# Patient Record
Sex: FEMALE | Race: WHITE | Employment: OTHER | URBAN - NONMETROPOLITAN AREA
[De-identification: names, ages, dates, MRNs, and addresses within clinical notes are randomized per-mention and may not be internally consistent; named-entity substitution may affect disease eponyms.]

---

## 2018-02-12 ENCOUNTER — OFFICE VISIT (OUTPATIENT)
Dept: SURGERY | Age: 73
End: 2018-02-12
Payer: MEDICARE

## 2018-02-12 VITALS
BODY MASS INDEX: 18.32 KG/M2 | RESPIRATION RATE: 18 BRPM | HEIGHT: 63 IN | WEIGHT: 103.4 LBS | SYSTOLIC BLOOD PRESSURE: 120 MMHG | HEART RATE: 92 BPM | DIASTOLIC BLOOD PRESSURE: 60 MMHG | OXYGEN SATURATION: 90 % | TEMPERATURE: 98 F

## 2018-02-12 DIAGNOSIS — R92.8 OTHER ABNORMAL AND INCONCLUSIVE FINDINGS ON DIAGNOSTIC IMAGING OF BREAST: ICD-10-CM

## 2018-02-12 DIAGNOSIS — R59.0 LYMPHADENOPATHY, AXILLARY: Primary | ICD-10-CM

## 2018-02-12 DIAGNOSIS — R92.2 INCONCLUSIVE MAMMOGRAM: ICD-10-CM

## 2018-02-12 DIAGNOSIS — R06.02 SHORTNESS OF BREATH: ICD-10-CM

## 2018-02-12 DIAGNOSIS — N63.11 MASS OF UPPER OUTER QUADRANT OF RIGHT BREAST: ICD-10-CM

## 2018-02-12 DIAGNOSIS — N63.10 BREAST MASS, RIGHT: ICD-10-CM

## 2018-02-12 PROCEDURE — 99204 OFFICE O/P NEW MOD 45 MIN: CPT | Performed by: SURGERY

## 2018-02-12 RX ORDER — GABAPENTIN 100 MG/1
100 CAPSULE ORAL 3 TIMES DAILY
COMMUNITY

## 2018-02-12 RX ORDER — NYSTATIN 100000 [USP'U]/G
POWDER TOPICAL 4 TIMES DAILY PRN
COMMUNITY
End: 2018-03-09 | Stop reason: CLARIF

## 2018-02-12 RX ORDER — BENZONATATE 100 MG/1
100 CAPSULE ORAL 3 TIMES DAILY PRN
COMMUNITY

## 2018-02-12 RX ORDER — GUAIFENESIN 400 MG/1
400 TABLET ORAL 4 TIMES DAILY PRN
COMMUNITY
End: 2018-02-21

## 2018-02-12 RX ORDER — VERAPAMIL HYDROCHLORIDE 240 MG/1
240 CAPSULE, EXTENDED RELEASE ORAL NIGHTLY
COMMUNITY

## 2018-02-12 RX ORDER — MONTELUKAST SODIUM 10 MG/1
10 TABLET ORAL NIGHTLY
COMMUNITY

## 2018-02-12 RX ORDER — FLUTICASONE PROPIONATE 50 MCG
1 SPRAY, SUSPENSION (ML) NASAL DAILY PRN
COMMUNITY

## 2018-02-12 RX ORDER — ALPRAZOLAM 0.5 MG/1
0.5 TABLET ORAL 3 TIMES DAILY PRN
COMMUNITY

## 2018-02-12 RX ORDER — ALBUTEROL SULFATE 90 UG/1
2 AEROSOL, METERED RESPIRATORY (INHALATION) EVERY 4 HOURS PRN
COMMUNITY

## 2018-02-12 RX ORDER — HYDROCHLOROTHIAZIDE 12.5 MG/1
12.5 CAPSULE, GELATIN COATED ORAL EVERY MORNING
COMMUNITY

## 2018-02-12 RX ORDER — ASPIRIN 325 MG
325 TABLET ORAL DAILY
Status: ON HOLD | COMMUNITY
End: 2018-03-15 | Stop reason: HOSPADM

## 2018-02-12 RX ORDER — CYCLOBENZAPRINE HCL 10 MG
10 TABLET ORAL 3 TIMES DAILY PRN
Status: ON HOLD | COMMUNITY
End: 2019-04-09

## 2018-02-12 RX ORDER — ARFORMOTEROL TARTRATE 15 UG/2ML
1 SOLUTION RESPIRATORY (INHALATION) 2 TIMES DAILY
COMMUNITY
End: 2019-02-21 | Stop reason: ALTCHOICE

## 2018-02-12 RX ORDER — SENNA AND DOCUSATE SODIUM 50; 8.6 MG/1; MG/1
1 TABLET, FILM COATED ORAL DAILY PRN
COMMUNITY

## 2018-02-12 RX ORDER — BUDESONIDE 0.5 MG/2ML
1 INHALANT ORAL 2 TIMES DAILY
COMMUNITY

## 2018-02-12 RX ORDER — VILAZODONE HYDROCHLORIDE 40 MG/1
20 TABLET ORAL SEE ADMIN INSTRUCTIONS
COMMUNITY
End: 2019-02-21 | Stop reason: ALTCHOICE

## 2018-02-12 NOTE — PROGRESS NOTES
breast mass. This has been present for several months. The lesion has gradually worsened more recently. There has been no associated symptoms of discharge, tenderness, sudden swelling, or pain nipple changes and nipple discharge and denied galactorrhea. Risk assessment: mom with breast CA and sister with breast CA. She has not been on previous estrogen therapy. Current studies have included none. She smokes 2 packs of cigarettes a day. She is short of breath and purse is her lips when breathing. She denies having had a recent chest x-ray. She denies any recent change in weight. She reported having a recent lung biopsy at [facility] but we cannot find a record of this. She is on multiple inhalers she states her COPD is managed by . She is not on oxygen therapy at home. Patient reports having lost about 20-25 pounds over the last year. She states she has been treated in the past by Dr. Diann Parry for what sounds like iron deficiency. Both her mother and sister had breast cancer at age 36.        Past Medical History  Past Medical History:   Diagnosis Date    Abnormal weight gain     Adjustment disorder     Anemia     Anxiety     COPD (chronic obstructive pulmonary disease) (HCC)     GERD (gastroesophageal reflux disease)     Heart palpitations     Hypertension     Hypokalemia     Tinea unguium        Past Surgical History  Past Surgical History:   Procedure Laterality Date    HERNIA REPAIR      umbilical -as a child    LUNG BIOPSY  2016    [facility]-    OTHER SURGICAL HISTORY      cubital tunnel release left elbow-    OTHER SURGICAL HISTORY  2000    Nu removal-       Medications  Current Outpatient Prescriptions   Medication Sig Dispense Refill    Multiple Vitamins-Minerals (CENTRUM SILVER PO) Take 1 tablet by mouth daily      vitamin D (CHOLECALCIFEROL) 1000 UNIT TABS tablet Take 1,000 Units by mouth daily      NONFORMULARY Cinnamon supplements daily      aspirin 325 MG tablet Take 325 mg by mouth daily      Tiotropium Bromide Monohydrate (SPIRIVA HANDIHALER IN) Inhale into the lungs daily      Arformoterol Tartrate (BROVANA) 15 MCG/2ML NEBU Take 1 ampule by nebulization 2 times daily      budesonide (PULMICORT) 0.5 MG/2ML nebulizer suspension Take 1 ampule by nebulization 2 times daily      verapamil (VERELAN) 240 MG extended release capsule Take 240 mg by mouth nightly      montelukast (SINGULAIR) 10 MG tablet Take 10 mg by mouth nightly      ALPRAZolam (XANAX) 0.5 MG tablet Take 0.5 mg by mouth 3 times daily as needed for Sleep.  hydrochlorothiazide (MICROZIDE) 12.5 MG capsule Take 12.5 mg by mouth every morning      benzonatate (TESSALON) 100 MG capsule Take 100 mg by mouth 3 times daily as needed for Cough      vilazodone HCl (VILAZODONE HCL) 40 MG TABS Take 40 mg by mouth daily      albuterol sulfate  (90 Base) MCG/ACT inhaler Inhale 2 puffs into the lungs every 4 hours as needed for Wheezing      cyclobenzaprine (FLEXERIL) 10 MG tablet Take 10 mg by mouth 3 times daily as needed for Muscle spasms      nystatin (MYCOSTATIN) 657412 UNIT/GM powder Apply topically 4 times daily as needed Apply topically 4 times daily.  guaiFENesin 400 MG tablet Take 400 mg by mouth 4 times daily as needed for Cough      sennosides-docusate sodium (SENOKOT-S) 8.6-50 MG tablet Take 1 tablet by mouth daily as needed for Constipation      NONFORMULARY Eye drops as needed      fluticasone (FLONASE) 50 MCG/ACT nasal spray 1 spray by Nasal route daily as needed for Rhinitis      gabapentin (NEURONTIN) 100 MG capsule Take 100 mg by mouth 3 times daily. No current facility-administered medications for this visit.       Allergies  No Known Allergies    Family History  Family History   Problem Relation Age of Onset    Cancer Mother      lung    Breast Cancer Mother     Other Father      leukemia    Other Sister      breast fibrosis    Other Brother      suicide    time. No distress. Thin, pale   HENT:   Head: Normocephalic and atraumatic. Edentulous   Eyes: EOM are normal. Left eye exhibits no discharge. No scleral icterus. Neck: No JVD present. Cardiovascular: Normal rate and normal heart sounds. Pulmonary/Chest: No respiratory distress. She has wheezes. Right breast exhibits mass. Right breast exhibits no inverted nipple, no nipple discharge, no skin change and no tenderness. Left breast exhibits inverted nipple. Left breast exhibits no nipple discharge, no skin change and no tenderness. Purse his lips when she breathes   Abdominal: She exhibits no distension. There is no tenderness. There is no rebound. Musculoskeletal: She exhibits edema. Lymphadenopathy:     She has no cervical adenopathy. Neurological: She is oriented to person, place, and time. No cranial nerve deficit. Skin: Skin is warm and dry. No rash noted. No erythema. Psychiatric: She has a normal mood and affect. Her behavior is normal.   Nursing note and vitals reviewed.       No results found for: WBC, HGB, HCT, PLT, CHOL, TRIG, HDL, LDLDIRECT, ALT, AST, NA, K, CL, CREATININE, BUN, CO2, TSH, PSA, INR, GLUF, LABA1C, LABMICR

## 2018-02-13 ENCOUNTER — HOSPITAL ENCOUNTER (OUTPATIENT)
Dept: WOMENS IMAGING | Age: 73
Discharge: HOME | End: 2018-02-13
Payer: MEDICARE

## 2018-02-13 DIAGNOSIS — R59.0 LYMPHADENOPATHY, AXILLARY: ICD-10-CM

## 2018-02-13 DIAGNOSIS — R92.2 INCONCLUSIVE MAMMOGRAM: ICD-10-CM

## 2018-02-13 DIAGNOSIS — N63.10 BREAST MASS, RIGHT: ICD-10-CM

## 2018-02-13 PROCEDURE — 76642 ULTRASOUND BREAST LIMITED: CPT

## 2018-02-13 PROCEDURE — G0279 TOMOSYNTHESIS, MAMMO: HCPCS

## 2018-02-13 ASSESSMENT — ENCOUNTER SYMPTOMS
PHOTOPHOBIA: 0
TROUBLE SWALLOWING: 1
SHORTNESS OF BREATH: 1
EYE REDNESS: 0
ABDOMINAL DISTENTION: 0
WHEEZING: 1
NAUSEA: 0
BLOOD IN STOOL: 0
BACK PAIN: 0
VOMITING: 0
ABDOMINAL PAIN: 0
COUGH: 1

## 2018-02-21 ENCOUNTER — HOSPITAL ENCOUNTER (OUTPATIENT)
Dept: WOMENS IMAGING | Age: 73
Discharge: HOME | End: 2018-02-21
Payer: MEDICARE

## 2018-02-21 VITALS — TEMPERATURE: 98.4 F | DIASTOLIC BLOOD PRESSURE: 71 MMHG | SYSTOLIC BLOOD PRESSURE: 152 MMHG | HEART RATE: 73 BPM

## 2018-02-21 DIAGNOSIS — N63.20 MASS OF LEFT BREAST: ICD-10-CM

## 2018-02-21 DIAGNOSIS — N63.10 MASS OF RIGHT BREAST: ICD-10-CM

## 2018-02-21 DIAGNOSIS — R59.9 ENLARGED LYMPH NODE: ICD-10-CM

## 2018-02-21 PROCEDURE — 38505 NEEDLE BIOPSY LYMPH NODES: CPT

## 2018-02-21 PROCEDURE — 88360 TUMOR IMMUNOHISTOCHEM/MANUAL: CPT

## 2018-02-21 PROCEDURE — 88342 IMHCHEM/IMCYTCHM 1ST ANTB: CPT

## 2018-02-21 PROCEDURE — 77066 DX MAMMO INCL CAD BI: CPT

## 2018-02-21 PROCEDURE — A4648 IMPLANTABLE TISSUE MARKER: HCPCS

## 2018-02-21 PROCEDURE — 19083 BX BREAST 1ST LESION US IMAG: CPT

## 2018-02-21 PROCEDURE — 88305 TISSUE EXAM BY PATHOLOGIST: CPT

## 2018-02-21 PROCEDURE — 88377 M/PHMTRC ALYS ISHQUANT/SEMIQ: CPT

## 2018-02-21 PROCEDURE — 19084 BX BREAST ADD LESION US IMAG: CPT

## 2018-02-21 PROCEDURE — C1894 INTRO/SHEATH, NON-LASER: HCPCS

## 2018-02-21 RX ORDER — GUAIFENESIN 400 MG/1
400 TABLET ORAL 4 TIMES DAILY PRN
COMMUNITY
End: 2018-03-09 | Stop reason: CLARIF

## 2018-02-21 RX ORDER — DOXYCYCLINE HYCLATE 50 MG/1
324 CAPSULE, GELATIN COATED ORAL
COMMUNITY

## 2018-02-21 NOTE — PROGRESS NOTES
Formulation and discussion of sedation / procedure plans, risks, benefits, side effects and alternatives with patient and/or responsible adult completed.     Electronically signed by Caryle Silver, MD on 2/21/2018 at 1:22 PM

## 2018-02-21 NOTE — PROGRESS NOTES
[facility] MEDICAL [facility]  Pre-Biopsy Assessment      Patient Education    Written information about procedure Yes   [] Left        [] Right       [x] Bilateral   Procedural steps explained Yes   [] Stereotactic Biopsy      [x] Ultrasound Biopsy   Post-op potential: bruising, hematoma, pain Yes    Self-care: activity, care of dressing Yes    Patient verbalized understanding Yes    Consent signed and witnessed Yes      Hormone Therapy Status: none    Recent Medication: [x] Aspirin [] Ibuprofen  [] Coumadin [] N/A   Last Dose: 2/21/2018  Emotional Status: [] Calm   [] Nervous   [x] Emotionally Upset    Language or Physical Barriers: none  Comments: none        Electronically signed by Kain Agosto RN on 2/21/2018 at 1:09 PM

## 2018-02-23 ENCOUNTER — CLINICAL DOCUMENTATION (OUTPATIENT)
Dept: WOMENS IMAGING | Age: 73
End: 2018-02-23

## 2018-02-28 ASSESSMENT — ENCOUNTER SYMPTOMS
EYE REDNESS: 0
TROUBLE SWALLOWING: 1
ABDOMINAL PAIN: 0
BACK PAIN: 0
NAUSEA: 0
VOMITING: 0
ABDOMINAL DISTENTION: 0
SHORTNESS OF BREATH: 1
BLOOD IN STOOL: 0
PHOTOPHOBIA: 0

## 2018-02-28 NOTE — PROGRESS NOTES
Jose Sahu MD   General Surgery  New Patient Evaluation in Office  Pt Name: Faraz Kinsey  Date of Birth 1945   Today's Date: 3/1/2018  Medical Record Number: [id number]  Referring Provider: No ref. provider found  Primary Care Provider: Rai Marx. Gavin Arce MD  Chief Complaint   Patient presents with    Surgical Consult     new pt-ref. [facility]-rt breast cancer-       ASSESSMENT      1. Invasive ductal carcinoma of breast, female, right ([facility] [address]    2. Essential hypertension    3. Pre-op testing    4. Mixed simple and mucopurulent chronic bronchitis ([address]    7. COPD     PLANS    1. Patient desires to proceed with breast conservation therapy. Right partial mastectomy and axillary sentinel lymph node biopsy  2. Patient will see her primary care physician Orlando Mclaughlin for medical evaluation preoperatively  3. Patient will see her medical oncologist Dr. Monica Gilliam postoperatively as well as referral to radiation oncology. 4.  Risks of procedure were discussed with patient: Include but not limited to bleeding, infection, need for reexcision. She is aware of the need for radiation therapy post operatively would be considered standard. She was counseled that having a triple negative cancer chemotherapy would be standard treatment as well. SUBJECTIVE   History of present illness:   Breast Mass:Jennifer is a 71 y.o.  female seen in the office for evaluation of a breast mass. Change was noted a few months ago. Patient does not routinely do self breast exams. Patient has noted a change on breast exam. Breast cancer risk factors include mom with breast CA and sister with breast CA. Age of menarche was 16. Age of menopause was 55. She was seen by her primary caregiver, Orlando Mclaughlin, and was referred for evaluation of a palpable right breast mass. She had no diagnostic imaging completed. She does not have a 's license and stated she could not get to have any testing performed.   She is also Gastrointestinal: Negative for abdominal distention, abdominal pain, blood in stool, nausea and vomiting. Endocrine: Positive for polydipsia. Genitourinary: Positive for decreased urine volume. Negative for dysuria, flank pain, frequency and vaginal discharge. Musculoskeletal: Positive for arthralgias and neck stiffness. Negative for back pain and joint swelling. Skin: Positive for pallor. Negative for wound. Neurological: Positive for numbness. Negative for seizures and facial asymmetry. Hematological: Positive for adenopathy. Does not bruise/bleed easily. Psychiatric/Behavioral: Negative for agitation and confusion. The patient is nervous/anxious. OBJECTIVE     /64 (Site: Right Arm, Position: Sitting, Cuff Size: Medium Adult)   Pulse 80   Temp 96.6 °F (35.9 °C) (Tympanic)   Resp 18   Ht 5' 2\" (1.575 m)   Wt 101 lb 3.2 oz (45.9 kg)   SpO2 93%   Breastfeeding? No   BMI 18.51 kg/m²     Physical Exam   Constitutional: She is oriented to person, place, and time. No distress. Thin, pale   HENT:   Head: Normocephalic and atraumatic. Edentulous   Eyes: EOM are normal. Left eye exhibits no discharge. No scleral icterus. Neck: No JVD present. Cardiovascular: Normal rate and normal heart sounds. Pulmonary/Chest: No respiratory distress. She has no wheezes. Right breast exhibits mass. Right breast exhibits no inverted nipple, no nipple discharge, no skin change and no tenderness. Left breast exhibits inverted nipple. Left breast exhibits no nipple discharge, no skin change and no tenderness. Abdominal: She exhibits no distension. There is no tenderness. There is no rebound. Musculoskeletal: She exhibits edema. Lymphadenopathy:     She has no cervical adenopathy. Neurological: She is oriented to person, place, and time. No cranial nerve deficit. Skin: Skin is warm and dry. No rash noted. No erythema. Psychiatric: She has a normal mood and affect.  Her behavior is normal.   Nursing note and vitals reviewed. Lab Results   Component Value Date    WBC 6.1 2018    HGB 16.1 (H) 2018    HCT 48.5 (H) 2018     (H) 2018     (H) 2018    K 4.9 2018     2018    CREATININE 0.7 2018    BUN 16 2018    CO2 32 2018     [facility]      SONIA PRITCHARD                 [id number]  Assoc.                                              Page 1 of 1  [address]  [address], [place] [address]                                                      PROC: 2018  [facility]                                    RECV: 2018  [address]                                    RPTD: 2018  [address], [place] [address]                      MRN:  [id number]    LOC: [facility]                      ACCT: [id number]  SEX: F                      : 1945  AGE: 72 Y                         PATHOLOGY REPORT                      ATTN: Regina Jarrell                      REQ: Regina Jarrell      Copies To:   MARY RIVAS; [facility]; MIHIR SÁNCHEZ;       Clinical Information: RT. BREAST MASSES, LT. BREAST MASS, ENLARGED NODE  RT. & LT. AXILLA    ADDENDUM 18    FINAL DIAGNOSIS:  A.  Breast, right, upper outer quadrant, mass, 1.2 cm, U clip, needle  core biopsy:   Invasive ductal carcinoma, Sam score II. B.  Breast, right, upper outer quadrant, 0.8 cm, barbell clip, needle  core biopsy:   Fibroadenoma.   See microscopic description. C.  Breast, right, axillary node, tribell clip, needle core biopsy:   Lymph node, negative for metastatic carcinoma (0/1). D.  Breast, left, upper outer quadrant, mass, barbell clip, needle core  biopsy:   Adenosis with microcalcifications.   Negative for malignancy. E.  Breast, left, axilla, enlarged node, tribell clip, needle core  biopsy:   Lymph node, negative for metastatic carcinoma (0/1).     ADDENDUM 18:  BREAST BIOMARKERS (performed on part A)*  Estrogen Receptor: (Clone SP1), [employer]       (x) Negative ( < 1% of cells staining)    Progesterone Receptor: (Clone 1E2), FONU2       (x) Negative ( < 1% of cells staining)    Ki-67 (clone 30-9)       Percentage of positive nuclei:  50%               Unfavorable  > 20%    Inform HER-2 Dual JIGNA (Reva Eagle)  (x)  Nonamplified - HER2/CEP17 ratio  < 2.0 AND average HER2 copy  number  < 4.0 signals/cell    Number of observers:  1  Number of invasive tumor cells counted:  20  Using dual probe assay:   Average number of HER2 signals per cell:  1.8   Average number of CEP17 signals per cell:  1.9   HER2/CEP17 ratio:  0.9    Her-2/joelle (Immunohistochemistry - Clone 4B5, FONU2)       (x) Not performed    External Controls:  (x)  Adequate     Internal Controls:  (x)  Adequate     Standard Assay Conditions:  (x)  Met    Staining Method Used:   Formalin fixation   Antigen retrieval type:  Cell Conditioning 1, mild hesham   Time in antigen retrieval:  30 minutes   Detection system type:   DAB Ultraview kit    Specimen:  A) CORE NEEDLE BREAST BIOPSY, RT. MASS UOQ, 1.2 CM, \"U\" CLIP  B) CORE NEEDLE BREAST BIOPSY, RT. MASS, UOQ, 0.8 CM, BARBELL CLIP  C) CORE NEEDLE BREAST BIOPSY, RT, AXILLARY NODE, TRIBELL CLIP  D) CORE NEEDLE BREAST BIOPSY, LT. MASS, UOQ, BARBELL CLIP  E) CORE NEEDLE BREAST BIOPSY, LT. AXILLA ENLARGED NODE, TRIBELL CLIP      Gross Examination:  A - The container is labeled Mila Hernandez, right breast upper outer  quadrant, 1.2 cm, U clip.  Received in formalin are several cylindrical  and fragmented bits of yellow-white soft tissue aggregating to about  1.2 x 0.6 x 0.1 cm. The specimen is entirely submitted.   1 ns.     Fixative:  10% neutral buffered formalin  Tissue removal time: 1341  Tissue fixation time: 1342  Total fixation time: 30 hours and 18 minutes    B - The container is labeled Mila Hernandez, right breast upper outer  quadrant, 0.8 cm barbell clip.  Received in formalin are

## 2018-03-01 ENCOUNTER — OFFICE VISIT (OUTPATIENT)
Dept: SURGERY | Age: 73
End: 2018-03-01
Payer: MEDICARE

## 2018-03-01 VITALS
HEIGHT: 62 IN | WEIGHT: 101.2 LBS | SYSTOLIC BLOOD PRESSURE: 110 MMHG | TEMPERATURE: 96.6 F | RESPIRATION RATE: 18 BRPM | HEART RATE: 80 BPM | DIASTOLIC BLOOD PRESSURE: 64 MMHG | BODY MASS INDEX: 18.62 KG/M2 | OXYGEN SATURATION: 93 %

## 2018-03-01 DIAGNOSIS — C50.911 INVASIVE DUCTAL CARCINOMA OF BREAST, FEMALE, RIGHT (HCC): Primary | ICD-10-CM

## 2018-03-01 DIAGNOSIS — I10 ESSENTIAL HYPERTENSION: ICD-10-CM

## 2018-03-01 DIAGNOSIS — J41.8 MIXED SIMPLE AND MUCOPURULENT CHRONIC BRONCHITIS (HCC): ICD-10-CM

## 2018-03-01 DIAGNOSIS — Z01.818 PRE-OP TESTING: ICD-10-CM

## 2018-03-01 PROCEDURE — 99214 OFFICE O/P EST MOD 30 MIN: CPT | Performed by: SURGERY

## 2018-03-01 NOTE — PATIENT INSTRUCTIONS
Patient Education        Lumpectomy: Before Your Surgery  What is a lumpectomy? A lumpectomy is surgery to remove cancer from the breast. Your doctor will make a small cut (incision) and take out the cancer. The whole breast will not be removed. The doctor will try to also take a small amount of normal tissue around the cancer. This is known as \"getting clear margins. \" Some women will need another surgery to be sure the margins are clear. The doctor may also check the nearby lymph nodes during the surgery. After a lumpectomy, you will probably go home the same day. Most women can go back to work or their normal routine in 1 to 3 weeks. This depends on how you feel. It also depends on the type of work you do and whether you need more treatment. This may include radiation or chemotherapy. Most women who have a lumpectomy for cancer also get radiation treatment. The surgery will leave scars. Sometimes it leaves a dent in the breast too. Most women will look normal in a bra. But your breasts may not match in size or shape after surgery. This depends on the size of your breasts. It also depends on how much tissue was removed. When you find out that you have cancer, you may feel many emotions and may need some help coping. Seek out family, friends, and counselors for support. You also can do things at home to make yourself feel better while you go through treatment. Call the [facility] (5-886.627.1812) or visit its website at [address] [employer] for more information. Follow-up care is a key part of your treatment and safety. Be sure to make and go to all appointments, and call your doctor if you are having problems. It's also a good idea to know your test results and keep a list of the medicines you take. What happens before surgery? ?Surgery can be stressful. This information will help you understand what you can expect. And it will help you safely prepare for surgery. ? Preparing for surgery  ? · Understand exactly what surgery is planned, along with the risks, benefits, and other options. · Tell your doctors ALL the medicines, vitamins, supplements, and herbal remedies you take. Some of these can increase the risk of bleeding or interact with anesthesia. ? · If you take blood thinners, such as warfarin (Coumadin), clopidogrel (Plavix), or aspirin, be sure to talk to your doctor. He or she will tell you if you should stop taking these medicines before your surgery. Make sure that you understand exactly what your doctor wants you to do.   ? · Your doctor will tell you which medicines to take or stop before your surgery. You may need to stop taking certain medicines a week or more before surgery. So talk to your doctor as soon as you can.   ? · If you have an advance directive, let your doctor know. It may include a living will and a durable power of  for health care. Bring a copy to the hospital. If you don't have one, you may want to prepare one. It lets your doctor and loved ones know your health care wishes. Doctors advise that everyone prepare these papers before any type of surgery or procedure. What happens on the day of surgery? · Follow the instructions exactly about when to stop eating and drinking. If you don't, your surgery may be canceled. If your doctor told you to take your medicines on the day of surgery, take them with only a sip of water. ? · Take a bath or shower before you come in for your surgery. Do not apply lotions, perfumes, deodorants, or nail polish. ? · Do not shave the surgical site yourself. ? · Take off all jewelry and piercings. And take out contact lenses, if you wear them. ? · Bring a comfortable, supportive bra with you. You will need to wear this all the time, even during the night, for the first week after surgery. ? At the hospital or surgery center   · Bring a picture ID. ?  · The area for surgery is often marked to make sure there are no

## 2018-03-02 ENCOUNTER — HOSPITAL ENCOUNTER (OUTPATIENT)
Age: 73
Discharge: HOME | End: 2018-03-02
Payer: MEDICARE

## 2018-03-02 LAB
ANION GAP SERPL CALCULATED.3IONS-SCNC: 13 MEQ/L (ref 8–16)
ANISOCYTOSIS: ABNORMAL
BASOPHILS # BLD: 0.6 %
BASOPHILS ABSOLUTE: 0 THOU/MM3 (ref 0–0.1)
BUN BLDV-MCNC: 16 MG/DL (ref 7–22)
CALCIUM SERPL-MCNC: 9.3 MG/DL (ref 8.5–10.5)
CHLORIDE BLD-SCNC: 101 MEQ/L (ref 98–111)
CO2: 32 MEQ/L (ref 23–33)
CREAT SERPL-MCNC: 0.7 MG/DL (ref 0.4–1.2)
EKG ATRIAL RATE: 56 BPM
EKG P AXIS: 56 DEGREES
EKG P-R INTERVAL: 160 MS
EKG Q-T INTERVAL: 420 MS
EKG QRS DURATION: 90 MS
EKG QTC CALCULATION (BAZETT): 405 MS
EKG R AXIS: 93 DEGREES
EKG T AXIS: 84 DEGREES
EKG VENTRICULAR RATE: 56 BPM
EOSINOPHIL # BLD: 2.9 %
EOSINOPHILS ABSOLUTE: 0.2 THOU/MM3 (ref 0–0.4)
GFR SERPL CREATININE-BSD FRML MDRD: 82 ML/MIN/1.73M2
GLUCOSE BLD-MCNC: 99 MG/DL (ref 70–108)
HCT VFR BLD CALC: 48.5 % (ref 37–47)
HEMOGLOBIN: 16.1 GM/DL (ref 12–16)
LYMPHOCYTES # BLD: 26.2 %
LYMPHOCYTES ABSOLUTE: 1.6 THOU/MM3 (ref 1–4.8)
MCH RBC QN AUTO: 31.2 PG (ref 27–31)
MCHC RBC AUTO-ENTMCNC: 33.2 GM/DL (ref 33–37)
MCV RBC AUTO: 93.9 FL (ref 81–99)
MONOCYTES # BLD: 6.4 %
MONOCYTES ABSOLUTE: 0.4 THOU/MM3 (ref 0.4–1.3)
NUCLEATED RED BLOOD CELLS: 0 /100 WBC
PDW BLD-RTO: 15.9 % (ref 11.5–14.5)
PLATELET # BLD: 468 THOU/MM3 (ref 130–400)
PMV BLD AUTO: 7.5 FL (ref 7.4–10.4)
POTASSIUM SERPL-SCNC: 4.9 MEQ/L (ref 3.5–5.2)
RBC # BLD: 5.17 MILL/MM3 (ref 4.2–5.4)
SEG NEUTROPHILS: 63.9 %
SEGMENTED NEUTROPHILS ABSOLUTE COUNT: 3.9 THOU/MM3 (ref 1.8–7.7)
SODIUM BLD-SCNC: 146 MEQ/L (ref 135–145)
WBC # BLD: 6.1 THOU/MM3 (ref 4.8–10.8)

## 2018-03-02 PROCEDURE — 80048 BASIC METABOLIC PNL TOTAL CA: CPT

## 2018-03-02 PROCEDURE — 93005 ELECTROCARDIOGRAM TRACING: CPT | Performed by: SURGERY

## 2018-03-02 PROCEDURE — 93010 ELECTROCARDIOGRAM REPORT: CPT | Performed by: NUCLEAR MEDICINE

## 2018-03-02 PROCEDURE — 36415 COLL VENOUS BLD VENIPUNCTURE: CPT

## 2018-03-02 PROCEDURE — 85025 COMPLETE CBC W/AUTO DIFF WBC: CPT

## 2018-03-03 ASSESSMENT — ENCOUNTER SYMPTOMS
COUGH: 0
WHEEZING: 0

## 2018-03-09 RX ORDER — GUAIFENESIN 600 MG/1
1200 TABLET, EXTENDED RELEASE ORAL 2 TIMES DAILY
COMMUNITY

## 2018-03-15 ENCOUNTER — ANESTHESIA EVENT (OUTPATIENT)
Dept: OPERATING ROOM | Age: 73
End: 2018-03-15
Payer: MEDICARE

## 2018-03-15 ENCOUNTER — ANESTHESIA (OUTPATIENT)
Dept: OPERATING ROOM | Age: 73
End: 2018-03-15
Payer: MEDICARE

## 2018-03-15 ENCOUNTER — HOSPITAL ENCOUNTER (OUTPATIENT)
Dept: GENERAL RADIOLOGY | Age: 73
Discharge: HOME | End: 2018-03-15
Payer: MEDICARE

## 2018-03-15 ENCOUNTER — HOSPITAL ENCOUNTER (OUTPATIENT)
Age: 73
Setting detail: OUTPATIENT SURGERY
Discharge: HOME | End: 2018-03-15
Attending: SURGERY | Admitting: SURGERY
Payer: MEDICARE

## 2018-03-15 VITALS
HEIGHT: 62 IN | HEART RATE: 58 BPM | DIASTOLIC BLOOD PRESSURE: 55 MMHG | OXYGEN SATURATION: 95 % | WEIGHT: 96.6 LBS | TEMPERATURE: 97.6 F | BODY MASS INDEX: 17.78 KG/M2 | SYSTOLIC BLOOD PRESSURE: 115 MMHG | RESPIRATION RATE: 16 BRPM

## 2018-03-15 VITALS
DIASTOLIC BLOOD PRESSURE: 53 MMHG | OXYGEN SATURATION: 99 % | SYSTOLIC BLOOD PRESSURE: 104 MMHG | RESPIRATION RATE: 15 BRPM | TEMPERATURE: 96.8 F

## 2018-03-15 DIAGNOSIS — C50.911 INVASIVE DUCTAL CARCINOMA OF BREAST, FEMALE, RIGHT (HCC): ICD-10-CM

## 2018-03-15 DIAGNOSIS — C50.911 DUCTAL CARCINOMA OF RIGHT BREAST (HCC): ICD-10-CM

## 2018-03-15 DIAGNOSIS — Z17.0 MALIGNANT NEOPLASM OF UPPER-OUTER QUADRANT OF RIGHT BREAST IN FEMALE, ESTROGEN RECEPTOR POSITIVE (HCC): Primary | ICD-10-CM

## 2018-03-15 DIAGNOSIS — C50.411 MALIGNANT NEOPLASM OF UPPER-OUTER QUADRANT OF RIGHT BREAST IN FEMALE, ESTROGEN RECEPTOR POSITIVE (HCC): Primary | ICD-10-CM

## 2018-03-15 PROBLEM — Z17.1 MALIGNANT NEOPLASM OF UPPER-OUTER QUADRANT OF RIGHT BREAST IN FEMALE, ESTROGEN RECEPTOR NEGATIVE (HCC): Status: ACTIVE | Noted: 2018-03-15

## 2018-03-15 PROCEDURE — A9541 TC99M SULFUR COLLOID: HCPCS | Performed by: SURGERY

## 2018-03-15 PROCEDURE — 3700000000 HC ANESTHESIA ATTENDED CARE: Performed by: SURGERY

## 2018-03-15 PROCEDURE — 6370000000 HC RX 637 (ALT 250 FOR IP): Performed by: SURGERY

## 2018-03-15 PROCEDURE — 19301 PARTIAL MASTECTOMY: CPT | Performed by: SURGERY

## 2018-03-15 PROCEDURE — 6360000002 HC RX W HCPCS: Performed by: SPECIALIST

## 2018-03-15 PROCEDURE — 2500000003 HC RX 250 WO HCPCS: Performed by: SPECIALIST

## 2018-03-15 PROCEDURE — 88342 IMHCHEM/IMCYTCHM 1ST ANTB: CPT

## 2018-03-15 PROCEDURE — 38792 RA TRACER ID OF SENTINL NODE: CPT

## 2018-03-15 PROCEDURE — 6360000002 HC RX W HCPCS: Performed by: ANESTHESIOLOGY

## 2018-03-15 PROCEDURE — A4648 IMPLANTABLE TISSUE MARKER: HCPCS | Performed by: SURGERY

## 2018-03-15 PROCEDURE — 7100000000 HC PACU RECOVERY - FIRST 15 MIN: Performed by: SURGERY

## 2018-03-15 PROCEDURE — 7100000010 HC PHASE II RECOVERY - FIRST 15 MIN: Performed by: SURGERY

## 2018-03-15 PROCEDURE — 3600000013 HC SURGERY LEVEL 3 ADDTL 15MIN: Performed by: SURGERY

## 2018-03-15 PROCEDURE — 3430000000 HC RX DIAGNOSTIC RADIOPHARMACEUTICAL: Performed by: SURGERY

## 2018-03-15 PROCEDURE — 38525 BIOPSY/REMOVAL LYMPH NODES: CPT | Performed by: SURGERY

## 2018-03-15 PROCEDURE — 3700000001 HC ADD 15 MINUTES (ANESTHESIA): Performed by: SURGERY

## 2018-03-15 PROCEDURE — 88307 TISSUE EXAM BY PATHOLOGIST: CPT

## 2018-03-15 PROCEDURE — 2500000003 HC RX 250 WO HCPCS: Performed by: SURGERY

## 2018-03-15 PROCEDURE — 7100000001 HC PACU RECOVERY - ADDTL 15 MIN: Performed by: SURGERY

## 2018-03-15 PROCEDURE — 2580000003 HC RX 258: Performed by: SURGERY

## 2018-03-15 PROCEDURE — 6360000002 HC RX W HCPCS: Performed by: SURGERY

## 2018-03-15 PROCEDURE — 3600000003 HC SURGERY LEVEL 3 BASE: Performed by: SURGERY

## 2018-03-15 PROCEDURE — 7100000011 HC PHASE II RECOVERY - ADDTL 15 MIN: Performed by: SURGERY

## 2018-03-15 RX ORDER — SODIUM CHLORIDE 9 MG/ML
INJECTION, SOLUTION INTRAVENOUS CONTINUOUS
Status: DISCONTINUED | OUTPATIENT
Start: 2018-03-15 | End: 2018-03-15 | Stop reason: SDUPTHER

## 2018-03-15 RX ORDER — FENTANYL CITRATE 50 UG/ML
25 INJECTION, SOLUTION INTRAMUSCULAR; INTRAVENOUS EVERY 5 MIN PRN
Status: DISCONTINUED | OUTPATIENT
Start: 2018-03-15 | End: 2018-03-15 | Stop reason: HOSPADM

## 2018-03-15 RX ORDER — MORPHINE SULFATE 2 MG/ML
4 INJECTION, SOLUTION INTRAMUSCULAR; INTRAVENOUS
Status: DISCONTINUED | OUTPATIENT
Start: 2018-03-15 | End: 2018-03-15 | Stop reason: HOSPADM

## 2018-03-15 RX ORDER — MEPERIDINE HYDROCHLORIDE 25 MG/ML
12.5 INJECTION INTRAMUSCULAR; INTRAVENOUS; SUBCUTANEOUS EVERY 5 MIN PRN
Status: DISCONTINUED | OUTPATIENT
Start: 2018-03-15 | End: 2018-03-15 | Stop reason: HOSPADM

## 2018-03-15 RX ORDER — ONDANSETRON 2 MG/ML
4 INJECTION INTRAMUSCULAR; INTRAVENOUS EVERY 6 HOURS PRN
Status: DISCONTINUED | OUTPATIENT
Start: 2018-03-15 | End: 2018-03-15 | Stop reason: HOSPADM

## 2018-03-15 RX ORDER — HYDROCODONE BITARTRATE AND ACETAMINOPHEN 5; 325 MG/1; MG/1
2 TABLET ORAL EVERY 4 HOURS PRN
Status: DISCONTINUED | OUTPATIENT
Start: 2018-03-15 | End: 2018-03-15 | Stop reason: HOSPADM

## 2018-03-15 RX ORDER — DEXAMETHASONE SODIUM PHOSPHATE 4 MG/ML
INJECTION, SOLUTION INTRA-ARTICULAR; INTRALESIONAL; INTRAMUSCULAR; INTRAVENOUS; SOFT TISSUE PRN
Status: DISCONTINUED | OUTPATIENT
Start: 2018-03-15 | End: 2018-03-15 | Stop reason: SDUPTHER

## 2018-03-15 RX ORDER — ACETAMINOPHEN 325 MG/1
650 TABLET ORAL EVERY 4 HOURS PRN
Status: DISCONTINUED | OUTPATIENT
Start: 2018-03-15 | End: 2018-03-15 | Stop reason: HOSPADM

## 2018-03-15 RX ORDER — LIDOCAINE HYDROCHLORIDE 20 MG/ML
INJECTION, SOLUTION INFILTRATION; PERINEURAL PRN
Status: DISCONTINUED | OUTPATIENT
Start: 2018-03-15 | End: 2018-03-15 | Stop reason: SDUPTHER

## 2018-03-15 RX ORDER — BUPIVACAINE HYDROCHLORIDE 5 MG/ML
INJECTION, SOLUTION PERINEURAL PRN
Status: DISCONTINUED | OUTPATIENT
Start: 2018-03-15 | End: 2018-03-15 | Stop reason: HOSPADM

## 2018-03-15 RX ORDER — HYDROCODONE BITARTRATE AND ACETAMINOPHEN 5; 325 MG/1; MG/1
1 TABLET ORAL EVERY 4 HOURS PRN
Qty: 30 TABLET | Refills: 0 | Status: SHIPPED | OUTPATIENT
Start: 2018-03-15 | End: 2018-03-22

## 2018-03-15 RX ORDER — ACETAMINOPHEN 650 MG/1
650 SUPPOSITORY RECTAL EVERY 4 HOURS PRN
Status: DISCONTINUED | OUTPATIENT
Start: 2018-03-15 | End: 2018-03-15 | Stop reason: HOSPADM

## 2018-03-15 RX ORDER — SODIUM CHLORIDE 0.9 % (FLUSH) 0.9 %
10 SYRINGE (ML) INJECTION PRN
Status: DISCONTINUED | OUTPATIENT
Start: 2018-03-15 | End: 2018-03-15 | Stop reason: HOSPADM

## 2018-03-15 RX ORDER — SODIUM CHLORIDE 9 MG/ML
INJECTION, SOLUTION INTRAVENOUS CONTINUOUS
Status: DISCONTINUED | OUTPATIENT
Start: 2018-03-15 | End: 2018-03-15 | Stop reason: HOSPADM

## 2018-03-15 RX ORDER — DIPHENHYDRAMINE HYDROCHLORIDE 50 MG/ML
12.5 INJECTION INTRAMUSCULAR; INTRAVENOUS
Status: DISCONTINUED | OUTPATIENT
Start: 2018-03-15 | End: 2018-03-15 | Stop reason: HOSPADM

## 2018-03-15 RX ORDER — ONDANSETRON 2 MG/ML
INJECTION INTRAMUSCULAR; INTRAVENOUS PRN
Status: DISCONTINUED | OUTPATIENT
Start: 2018-03-15 | End: 2018-03-15 | Stop reason: SDUPTHER

## 2018-03-15 RX ORDER — FENTANYL CITRATE 50 UG/ML
50 INJECTION, SOLUTION INTRAMUSCULAR; INTRAVENOUS EVERY 5 MIN PRN
Status: DISCONTINUED | OUTPATIENT
Start: 2018-03-15 | End: 2018-03-15 | Stop reason: HOSPADM

## 2018-03-15 RX ORDER — SODIUM CHLORIDE 0.9 % (FLUSH) 0.9 %
10 SYRINGE (ML) INJECTION EVERY 12 HOURS SCHEDULED
Status: DISCONTINUED | OUTPATIENT
Start: 2018-03-15 | End: 2018-03-15 | Stop reason: SDUPTHER

## 2018-03-15 RX ORDER — HYDROCODONE BITARTRATE AND ACETAMINOPHEN 5; 325 MG/1; MG/1
1 TABLET ORAL EVERY 4 HOURS PRN
Status: DISCONTINUED | OUTPATIENT
Start: 2018-03-15 | End: 2018-03-15 | Stop reason: HOSPADM

## 2018-03-15 RX ORDER — METOCLOPRAMIDE HYDROCHLORIDE 5 MG/ML
10 INJECTION INTRAMUSCULAR; INTRAVENOUS
Status: DISCONTINUED | OUTPATIENT
Start: 2018-03-15 | End: 2018-03-15 | Stop reason: HOSPADM

## 2018-03-15 RX ORDER — GLYCOPYRROLATE 1 MG/5 ML
SYRINGE (ML) INTRAVENOUS PRN
Status: DISCONTINUED | OUTPATIENT
Start: 2018-03-15 | End: 2018-03-15 | Stop reason: SDUPTHER

## 2018-03-15 RX ORDER — PROPOFOL 10 MG/ML
INJECTION, EMULSION INTRAVENOUS PRN
Status: DISCONTINUED | OUTPATIENT
Start: 2018-03-15 | End: 2018-03-15 | Stop reason: SDUPTHER

## 2018-03-15 RX ORDER — LABETALOL HYDROCHLORIDE 5 MG/ML
5 INJECTION, SOLUTION INTRAVENOUS EVERY 10 MIN PRN
Status: DISCONTINUED | OUTPATIENT
Start: 2018-03-15 | End: 2018-03-15 | Stop reason: HOSPADM

## 2018-03-15 RX ORDER — FENTANYL CITRATE 50 UG/ML
INJECTION, SOLUTION INTRAMUSCULAR; INTRAVENOUS PRN
Status: DISCONTINUED | OUTPATIENT
Start: 2018-03-15 | End: 2018-03-15 | Stop reason: SDUPTHER

## 2018-03-15 RX ORDER — SODIUM CHLORIDE 0.9 % (FLUSH) 0.9 %
10 SYRINGE (ML) INJECTION EVERY 12 HOURS SCHEDULED
Status: DISCONTINUED | OUTPATIENT
Start: 2018-03-15 | End: 2018-03-15 | Stop reason: HOSPADM

## 2018-03-15 RX ORDER — SODIUM CHLORIDE 0.9 % (FLUSH) 0.9 %
10 SYRINGE (ML) INJECTION PRN
Status: DISCONTINUED | OUTPATIENT
Start: 2018-03-15 | End: 2018-03-15 | Stop reason: SDUPTHER

## 2018-03-15 RX ORDER — MORPHINE SULFATE 2 MG/ML
2 INJECTION, SOLUTION INTRAMUSCULAR; INTRAVENOUS
Status: DISCONTINUED | OUTPATIENT
Start: 2018-03-15 | End: 2018-03-15 | Stop reason: HOSPADM

## 2018-03-15 RX ORDER — PROMETHAZINE HYDROCHLORIDE 25 MG/ML
12.5 INJECTION, SOLUTION INTRAMUSCULAR; INTRAVENOUS
Status: DISCONTINUED | OUTPATIENT
Start: 2018-03-15 | End: 2018-03-15 | Stop reason: HOSPADM

## 2018-03-15 RX ADMIN — PHENYLEPHRINE HYDROCHLORIDE 100 MCG: 10 INJECTION INTRAMUSCULAR; INTRAVENOUS; SUBCUTANEOUS at 12:54

## 2018-03-15 RX ADMIN — PROPOFOL 50 MG: 10 INJECTION, EMULSION INTRAVENOUS at 13:00

## 2018-03-15 RX ADMIN — HYDROCODONE BITARTRATE AND ACETAMINOPHEN 1 TABLET: 5; 325 TABLET ORAL at 15:43

## 2018-03-15 RX ADMIN — LIDOCAINE HYDROCHLORIDE 50 MG: 20 INJECTION, SOLUTION INFILTRATION; PERINEURAL at 12:50

## 2018-03-15 RX ADMIN — ONDANSETRON 4 MG: 2 INJECTION INTRAMUSCULAR; INTRAVENOUS at 13:07

## 2018-03-15 RX ADMIN — PROPOFOL 150 MG: 10 INJECTION, EMULSION INTRAVENOUS at 12:50

## 2018-03-15 RX ADMIN — Medication 0.2 MG: at 12:55

## 2018-03-15 RX ADMIN — FENTANYL CITRATE 25 MCG: 50 INJECTION, SOLUTION INTRAMUSCULAR; INTRAVENOUS at 14:05

## 2018-03-15 RX ADMIN — Medication 0.99 MILLICURIE: at 09:50

## 2018-03-15 RX ADMIN — SODIUM CHLORIDE: 9 INJECTION, SOLUTION INTRAVENOUS at 13:59

## 2018-03-15 RX ADMIN — Medication 0.1 MG: at 12:48

## 2018-03-15 RX ADMIN — CEFAZOLIN SODIUM 1 G: 1 INJECTION, SOLUTION INTRAVENOUS at 12:55

## 2018-03-15 RX ADMIN — DEXAMETHASONE SODIUM PHOSPHATE 6 MG: 4 INJECTION, SOLUTION INTRAMUSCULAR; INTRAVENOUS at 13:08

## 2018-03-15 RX ADMIN — PHENYLEPHRINE HYDROCHLORIDE 100 MCG: 10 INJECTION INTRAMUSCULAR; INTRAVENOUS; SUBCUTANEOUS at 12:55

## 2018-03-15 RX ADMIN — FENTANYL CITRATE 50 MCG: 50 INJECTION INTRAMUSCULAR; INTRAVENOUS at 12:48

## 2018-03-15 RX ADMIN — SODIUM CHLORIDE: 9 INJECTION, SOLUTION INTRAVENOUS at 11:24

## 2018-03-15 RX ADMIN — FENTANYL CITRATE 50 MCG: 50 INJECTION INTRAMUSCULAR; INTRAVENOUS at 13:10

## 2018-03-15 ASSESSMENT — PAIN DESCRIPTION - ORIENTATION
ORIENTATION: RIGHT

## 2018-03-15 ASSESSMENT — PULMONARY FUNCTION TESTS
PIF_VALUE: 15
PIF_VALUE: 15
PIF_VALUE: 17
PIF_VALUE: 8
PIF_VALUE: 14
PIF_VALUE: 1
PIF_VALUE: 5
PIF_VALUE: 21
PIF_VALUE: 11
PIF_VALUE: 14
PIF_VALUE: 1
PIF_VALUE: 13
PIF_VALUE: 13
PIF_VALUE: 1
PIF_VALUE: 9
PIF_VALUE: 9
PIF_VALUE: 15
PIF_VALUE: 13
PIF_VALUE: 16
PIF_VALUE: 15
PIF_VALUE: 1
PIF_VALUE: 16
PIF_VALUE: 9
PIF_VALUE: 8
PIF_VALUE: 14
PIF_VALUE: 12
PIF_VALUE: 14
PIF_VALUE: 8
PIF_VALUE: 18
PIF_VALUE: 9
PIF_VALUE: 14
PIF_VALUE: 15
PIF_VALUE: 0
PIF_VALUE: 13
PIF_VALUE: 9
PIF_VALUE: 15
PIF_VALUE: 19
PIF_VALUE: 9
PIF_VALUE: 9
PIF_VALUE: 1
PIF_VALUE: 18
PIF_VALUE: 16
PIF_VALUE: 8
PIF_VALUE: 9
PIF_VALUE: 8
PIF_VALUE: 12
PIF_VALUE: 16
PIF_VALUE: 8
PIF_VALUE: 15

## 2018-03-15 ASSESSMENT — PAIN DESCRIPTION - DESCRIPTORS
DESCRIPTORS: SORE

## 2018-03-15 ASSESSMENT — PAIN DESCRIPTION - PAIN TYPE
TYPE: SURGICAL PAIN

## 2018-03-15 ASSESSMENT — PAIN SCALES - GENERAL
PAINLEVEL_OUTOF10: 5
PAINLEVEL_OUTOF10: 3
PAINLEVEL_OUTOF10: 4
PAINLEVEL_OUTOF10: 3

## 2018-03-15 ASSESSMENT — LIFESTYLE VARIABLES: SMOKING_STATUS: 1

## 2018-03-15 ASSESSMENT — PAIN - FUNCTIONAL ASSESSMENT: PAIN_FUNCTIONAL_ASSESSMENT: 0-10

## 2018-03-15 ASSESSMENT — PAIN DESCRIPTION - LOCATION
LOCATION: BREAST

## 2018-03-15 ASSESSMENT — COPD QUESTIONNAIRES: CAT_SEVERITY: SEVERE

## 2018-03-15 ASSESSMENT — PAIN DESCRIPTION - PROGRESSION: CLINICAL_PROGRESSION: GRADUALLY IMPROVING

## 2018-03-15 NOTE — ANESTHESIA POSTPROCEDURE EVALUATION
Department of Anesthesiology  Postprocedure Note    Patient: Beckie Rdz  MRN: [id number]  YOB: 1945  Date of evaluation: 3/15/2018  Time:  3:55 PM     Procedure Summary     Date:  03/15/18 Room / Location:  [address] [place]    Anesthesia Start:  1252 Anesthesia Stop:  9394    Procedure:  RIGHT BREAST LUMPECTOMY, SENTINAL LYMPH NODE BIOPSY (Right Breast) Diagnosis:  (RIGHT BREAST CANCER)    Surgeon:  Laxmi Palafox MD Responsible Provider:  Flynn Figueroa MD    Anesthesia Type:  general ASA Status:  3          Anesthesia Type: general    Stephon Phase I: Stephon Score: 9    Stephon Phase II:      Last vitals: Reviewed and per EMR flowsheets. Anesthesia Post Evaluation    Patient location during evaluation: PACU  Patient participation: complete - patient participated  Level of consciousness: awake and alert  Airway patency: patent  Nausea & Vomiting: no nausea and no vomiting  Complications: no  Cardiovascular status: hemodynamically stable  Respiratory status: acceptable  Hydration status: euvolemic      [facility]  POST-ANESTHESIA NOTE       Name:  Beckie Rdz                                         Age:  71 y.o.   MRN:  [id number]      Last Vitals:  /62   Pulse 58   Temp 97.6 °F (36.4 °C) (Temporal)   Resp 16   Ht 5' 2\" (1.575 m)   Wt 96 lb 9.6 oz (43.8 kg)   SpO2 94%   BMI 17.67 kg/m²   Patient Vitals for the past 4 hrs:   BP Temp Temp src Pulse Resp SpO2   03/15/18 1541 135/62 - - 58 16 94 %   03/15/18 1504 (!) 124/58 - - 56 16 93 %   03/15/18 1437 128/61 97.6 °F (36.4 °C) Temporal 53 16 97 %   03/15/18 1425 (!) 111/56 - - 52 21 93 %   03/15/18 1420 (!) 112/57 - - 52 15 92 %   03/15/18 1415 (!) 115/56 - - 51 16 93 %   03/15/18 1410 135/79 - - 51 19 95 %   03/15/18 1405 135/78 - - 51 14 98 %   03/15/18 1400 (!) 128/57 - - 50 15 100 %   03/15/18 1355 130/63 - - 50 15 99 %   03/15/18 1350 (!) 131/59 - - 52 20 98 %   03/15/18 1345 138/63 - - 52 17 99 %   03/15/18

## 2018-03-15 NOTE — ANESTHESIA PRE PROCEDURE
Department of Anesthesiology  Preprocedure Note       Name:  Carlo Medina   Age:  71 y.o.  :  1945                                          MRN:  [id number]         Date:  3/15/2018      Surgeon: Isha Hartmann):  Uli Rose MD    Procedure: Procedure(s):  RIGHT BREAST LUMPECTOMY, SENTINAL LYMPH NODE BIOPSY    Medications prior to admission:   Prior to Admission medications    Medication Sig Start Date End Date Taking? Authorizing Provider   guaiFENesin (MUCINEX) 600 MG extended release tablet Take 1,200 mg by mouth 2 times daily   Yes Historical Provider, MD   nystatin (MYCOSTATIN) 179850 UNIT/ML suspension Take 500,000 Units by mouth 4 times daily   Yes Historical Provider, MD   CINNAMON PO Take 1 capsule by mouth 3 times daily   Yes Historical Provider, MD   Pseudoephedrine-Guaifenesin ([address] PO) Take by mouth 2 times daily   Yes Historical Provider, MD   Tiotropium Bromide Monohydrate (Paulene Gauze IN) Inhale into the lungs daily   Yes Historical Provider, MD   Arformoterol Tartrate (BROVANA) 15 MCG/2ML NEBU Take 1 ampule by nebulization 2 times daily   Yes Historical Provider, MD   budesonide (PULMICORT) 0.5 MG/2ML nebulizer suspension Take 1 ampule by nebulization 2 times daily   Yes Historical Provider, MD   verapamil (VERELAN) 240 MG extended release capsule Take 240 mg by mouth nightly   Yes Historical Provider, MD   montelukast (SINGULAIR) 10 MG tablet Take 10 mg by mouth nightly   Yes Historical Provider, MD   ALPRAZolam (XANAX) 0.5 MG tablet Take 0.5 mg by mouth 3 times daily as needed for Sleep.    Yes Historical Provider, MD   hydrochlorothiazide (MICROZIDE) 12.5 MG capsule Take 12.5 mg by mouth every morning   Yes Historical Provider, MD   benzonatate (TESSALON) 100 MG capsule Take 100 mg by mouth 3 times daily as needed for Cough   Yes Historical Provider, MD   albuterol sulfate  (90 Base) MCG/ACT inhaler Inhale 2 puffs into the lungs every 4 hours as needed for Wheezing   Yes pulmonary disease) ([facility] [address]     GERD (gastroesophageal reflux disease)     Heart palpitations     Hypertension     Hypokalemia     Tinea unguium        Past Surgical History:        Procedure Laterality Date    HERNIA REPAIR      umbilical -as a child    [facility]-    OTHER SURGICAL HISTORY      cubital tunnel release left elbow-    OTHER SURGICAL HISTORY  2000    Elyce Tanisha removal-       Social History:    Social History   Substance Use Topics    Smoking status: Current Every Day Smoker     Packs/day: 2.00    Smokeless tobacco: Never Used    Alcohol use No                                Ready to quit: Not Answered  Counseling given: Not Answered      Vital Signs (Current):   Vitals:    03/09/18 1146 03/15/18 1041   BP:  (!) 117/58   Pulse:  73   Resp:  16   Temp:  97.4 °F (36.3 °C)   TempSrc:  Temporal   SpO2:  92%   Weight: 101 lb (45.8 kg) 96 lb 9.6 oz (43.8 kg)   Height: 5' 2\" (1.575 m) 5' 2\" (1.575 m)                                              BP Readings from Last 3 Encounters:   03/15/18 (!) 117/58   03/01/18 110/64   02/21/18 (!) 152/71       NPO Status: Time of last liquid consumption: 0715 (sip with meds)                        Time of last solid consumption: 2350                        Date of last liquid consumption: 03/15/18                        Date of last solid food consumption: 03/14/18    BMI:   Wt Readings from Last 3 Encounters:   03/15/18 96 lb 9.6 oz (43.8 kg)   03/01/18 101 lb 3.2 oz (45.9 kg)   02/12/18 103 lb 6.4 oz (46.9 kg)     Body mass index is 17.67 kg/m².     CBC:   Lab Results   Component Value Date    WBC 6.1 03/02/2018    RBC 5.17 03/02/2018    HGB 16.1 03/02/2018    HCT 48.5 03/02/2018    MCV 93.9 03/02/2018    RDW 15.9 03/02/2018     03/02/2018       CMP:   Lab Results   Component Value Date     03/02/2018    K 4.9 03/02/2018     03/02/2018    CO2 32 03/02/2018    BUN 16 03/02/2018    CREATININE 0.7 03/02/2018    LABGLOM 82 03/02/2018

## 2018-03-16 ENCOUNTER — TELEPHONE (OUTPATIENT)
Dept: SURGERY | Age: 73
End: 2018-03-16

## 2018-03-16 NOTE — OP NOTE
[address] [place] [address]                                 OPERATIVE REPORT    PATIENT NAME: Gloria Hou                   :        1945  MED REC NO:   [id number]                           ROOM:  ACCOUNT NO:   [de-identified]                           ADMIT DATE: 03/15/2018  PROVIDER:     BEE Brooksye Goes:  03/15/2018    SURGEON:  Rylan Bryant M.D. PREPROCEDURE DIAGNOSES:  Invasive ductal carcinoma, right breast upper  outer quadrant; ER, RI, HER2 negative; clinical stage T2N0M0, final  pathology pending. PROCEDURE:  Right partial mastectomy, right axillary sentinel lymph node  biopsy. ANESTHESIA:  General via laryngeal mask airway. ESTIMATED BLOOD LOSS:  30 mL. INDICATIONS:  See also history and physical examination. The patient is a  71-year-old female with COPD, who presented with a palpable mass in the  upper outer quadrant of the right breast.  She also has a lymph node  palpable high in the axilla. Ultrasound guided biopsies of the mass and  the axilla were performed, revealed an invasive ductal carcinoma, which was  ER negative, RI negative, and HER2 negative. The right axillary lymph node  was negative on needle biopsy for metastatic carcinoma. Her KI-67 was 50%. She opted to proceed with the breast conservation therapy. She sees Dr. Dickinson as well. OPERATIVE PROCEDURE:  The patient was brought to the operating room and  placed supine on the operating table. With even little activity, her  oxygen saturation on room air falls into the 80s. She was placed supine on  the operating room table with pneumatic sequential compression devices on  the lower extremities. She was administered general anesthetic via  laryngeal mask airway. She was given Ancef intravenously. Time-out was  performed. The right breast with her palpable mass was prepped and draped.   She had been injected earlier in the day with technetium sulfur colloid. Utilizing the Neoprobe, she did have high counts within the axilla. An  incision was made in the axilla. The larger palpable node high in the  axilla was removed, this was not the hot node, an additional hot node was  removed. They were both sent for sentinel lymph node evaluation. This had  been done through a skin incision in the axilla. Hemostasis was achieved. The wound was irrigated. Dermis was closed with interrupted Vicryl suture  and skin was closed with running subcuticular 4-0 Monocryl suture. Lumpectomy was then performed and an ellipse of skin was taken. Specimen  was oriented with the margin map. This was sent to Pathology for permanent  sectioning. Hemostasis was achieved with electrocautery. There was a few  clips placed in the lumpectomy cavity for subsequent radiation. Dermis was  closed with interrupted Vicryl suture and skin was closed with running  subcuticular 4-0 Monocryl suture, followed by application of Benzoin and  Steri-Strips. Sponge, sharp, and instrument counts were correct. The  patient was transported to the recovery area with a laryngeal mask airway  in place. Korina Bonilla M.D.    D: 03/15/2018 15:52:51       T: 03/15/2018 19:45:54     [email or web address]  Job#: [id number]     Doc#: [id number]    CC:  Danie Schultz. Juan Manuel  BEE Mason M.D.

## 2018-03-25 NOTE — PROGRESS NOTES
localization:   Invasive ductal adenocarcinoma, Sam score III, pT1b.   Closest margin = 4mm to deep margin.   Fibroadenomas with calcification.   Previous biopsy site changes. B.  [place] lymph nodes (2), right, resection:   Negative for malignancy (0/2). pN0 (sn). Specimen:  A) EXCISION OF BREAST, RIGHT BREAST  B) SENTINEL LYMPH NODE(S), RIGHT AXILLA      Gross Examination:  A - The container is labeled Amita Em, right breast tissue. Received in formalin is a lumpectomy specimen. Erenest [place] is no  localization wire.  The specimen measures 4 cm from the medial to  lateral margin, 3 cm from the cranial to caudal margins and 2.2 cm from  the skin to deep margin.   On the skin margin is a skin ellipse  measuring 2.1 x 1.3 cm.   The skin surface is tan and intact.  There  are no lesions.   The skin margin is inked blue.  The deep margin is  inked yellow.  The caudal and lateral margins are inked black and the  cranial and medial margins are inked green.   Sections through the  specimen reveal a white fibrous cut surface.   There is a well  circumscribed nodule measuring grossly 0.7 x 0.7 x 1.5 cm.  A biopsy  site is identified.   The identified nodule is about 0.5 cm from the  skin margin which is the nearest margin.   Representative sections are  submitted.   Cassette 1 - lateral margin; cassette 2 - skin, deep and  caudal margin; cassette 3 - skin, deep and cranial margin; cassette 4 -  skin, deep and caudal margin with nodule; cassette 5 - skin, deep and  cranial margin; cassette 6 - skin, deep and caudal margin; cassette 7-  skin, deep and cranial margin; cassette 8 - medial margin.   ss.     Fixative:  10% neutral buffered formalin  Tissue removal time: 1322  Tissue fixation time: 1326  Total fixation time:  30 hours and 34 minutes    B - The container is labeled Amita Em, right axilla sentinel  nodes.  Received in formalin are two lymph nodes measuring 1.2 and 1.7  cm.   The smaller node is bisected and entirely submitted in cassette  #1.  A larger node is bisected and entirely submitted in cassette #2.  ns.   SHERICE/WALTER:elizabeth    Fixative:  10% neutral buffered formalin  Tissue removal time: 1256  Tissue fixation time: 1317  Total fixation time: 30 hours and 43 minutes    Microscopic Examination:  A. Procedure   Excision (less than total mastectomy)    Specimen Laterality  Right    Tumor Size  reatest dimension of largest invasive focus >1 mm (specify exact  measurement) (millimeters): 8 mm    Histologic Type  Invasive carcinoma of no special type (ductal, not otherwise  specified),  The tumor is also slightly circumscribed and papillary and may arise in  a papillary carcinoma. Histologoc Grade ([place] Histologic Score)  Glandular (Acinar)/Tubular Differentiation   Score 2 (10% to 75% of tumor area forming glandular/tubular  structures)    Nuclear Pleomorphism   Score 3 (vesicular nuclei, often with prominent nucleoli, exhibiting     marked variation in size and shape, occasionally with very large and     bizarre forms)  Mitotic Rate   Score 3 (>=8 mitoses per mm2) (see Table 1)  + Number of mitoses per 10 high-power fields: 10  + Diameter of microscope field (millimeters):  mm    Overall Grade   Grade 3 (scores of 8 or 9)    + Tumor Focality  Single focus of invasive carcinoma    Ductal Carcinoma In Situ (DCIS)   No DCIS in specimen    + Lobular Carcinoma In Situ (LCIS)  No LCIS in specimen    Margins  Invasive Carcinoma Margins (required only if residual invasive  carcinoma is present in specimen)   Uninvolved by invasive carcinoma   Distance from closest margin (millimeters): 4 mm  to deep margin.     Regional Lymph Nodes  Lymph Node Examination (required only if lymph nodes are present in the  specimen)  Number of Lymph Nodes with Macrometastases (>2 mm): 0  Number of Lymph Nodes with Micrometastases (>0.2 mm to 2 mm and/or >200  cells):0  Number of Lymph Nodes with Isolated Tumor Cells (<=0.2 mm  A p63 immunostain* is  performed on block A1 and demonstrates intact staining consistent with  areas of adenosis.  The controls are excellent. *This test was developed and its performance characteristics determined  by [address] [place] [address] has not been cleared or  approved by the U.S. Food and Drug Administration. Pursuant to the  requirements of CLIA, this laboratory has established and verified the  test's accuracy and precision.  Additional information about this type  of test is available upon request.    B.  Multiple levels of the two sentinel lymph nodes are evaluated. There is no evidence of malignancy. [id number]  [id number]                                                      <Sign Out Dr. Julio Hurtado M.D., F.C.A.P.       [facility]/ [id number] [place] [address]  Printed on:  3/19/2018  [facility]  Original print date: 03/19/2018

## 2018-03-26 ENCOUNTER — OFFICE VISIT (OUTPATIENT)
Dept: SURGERY | Age: 73
End: 2018-03-26

## 2018-03-26 VITALS
RESPIRATION RATE: 24 BRPM | OXYGEN SATURATION: 90 % | SYSTOLIC BLOOD PRESSURE: 138 MMHG | HEART RATE: 78 BPM | WEIGHT: 102.4 LBS | DIASTOLIC BLOOD PRESSURE: 68 MMHG | BODY MASS INDEX: 18.84 KG/M2 | HEIGHT: 62 IN | TEMPERATURE: 97.9 F

## 2018-03-26 DIAGNOSIS — Z98.890 POSTOPERATIVE STATE: ICD-10-CM

## 2018-03-26 DIAGNOSIS — C50.911 INVASIVE DUCTAL CARCINOMA OF BREAST, FEMALE, RIGHT (HCC): Primary | ICD-10-CM

## 2018-03-26 PROCEDURE — 99024 POSTOP FOLLOW-UP VISIT: CPT | Performed by: SURGERY

## 2018-04-09 ENCOUNTER — OFFICE VISIT (OUTPATIENT)
Dept: SURGERY | Age: 73
End: 2018-04-09

## 2018-04-09 VITALS
DIASTOLIC BLOOD PRESSURE: 62 MMHG | OXYGEN SATURATION: 91 % | HEIGHT: 62 IN | TEMPERATURE: 97.8 F | SYSTOLIC BLOOD PRESSURE: 136 MMHG | WEIGHT: 103.4 LBS | BODY MASS INDEX: 19.03 KG/M2 | RESPIRATION RATE: 26 BRPM | HEART RATE: 80 BPM

## 2018-04-09 DIAGNOSIS — Z98.890 POSTOPERATIVE STATE: Primary | ICD-10-CM

## 2018-04-09 DIAGNOSIS — C50.911 INVASIVE DUCTAL CARCINOMA OF BREAST, FEMALE, RIGHT (HCC): ICD-10-CM

## 2018-04-09 PROCEDURE — 99024 POSTOP FOLLOW-UP VISIT: CPT | Performed by: SURGERY

## 2018-05-03 ENCOUNTER — OFFICE VISIT (OUTPATIENT)
Dept: SURGERY | Age: 73
End: 2018-05-03
Payer: MEDICARE

## 2018-05-03 VITALS
BODY MASS INDEX: 19.23 KG/M2 | HEIGHT: 62 IN | SYSTOLIC BLOOD PRESSURE: 104 MMHG | TEMPERATURE: 97.8 F | RESPIRATION RATE: 18 BRPM | WEIGHT: 104.5 LBS | DIASTOLIC BLOOD PRESSURE: 54 MMHG | HEART RATE: 80 BPM

## 2018-05-03 DIAGNOSIS — D17.1 LIPOMA OF BACK: Primary | ICD-10-CM

## 2018-05-03 DIAGNOSIS — I10 ESSENTIAL HYPERTENSION: ICD-10-CM

## 2018-05-03 DIAGNOSIS — C50.911 INVASIVE DUCTAL CARCINOMA OF BREAST, FEMALE, RIGHT (HCC): ICD-10-CM

## 2018-05-03 PROCEDURE — 99213 OFFICE O/P EST LOW 20 MIN: CPT | Performed by: SURGERY

## 2018-05-03 RX ORDER — ASPIRIN 325 MG
325 TABLET ORAL DAILY
COMMUNITY

## 2018-05-03 ASSESSMENT — ENCOUNTER SYMPTOMS
EYE PAIN: 0
SHORTNESS OF BREATH: 1
BACK PAIN: 1
WHEEZING: 1
ABDOMINAL PAIN: 0
EYE ITCHING: 0
ABDOMINAL DISTENTION: 0
COUGH: 1
COLOR CHANGE: 0
TROUBLE SWALLOWING: 0

## 2018-05-09 ENCOUNTER — HOSPITAL ENCOUNTER (OUTPATIENT)
Age: 73
Setting detail: OUTPATIENT SURGERY
Discharge: HOME | End: 2018-05-09
Attending: SURGERY | Admitting: SURGERY
Payer: MEDICARE

## 2018-05-09 ENCOUNTER — ANESTHESIA (OUTPATIENT)
Dept: OPERATING ROOM | Age: 73
End: 2018-05-09
Payer: MEDICARE

## 2018-05-09 ENCOUNTER — ANESTHESIA EVENT (OUTPATIENT)
Dept: OPERATING ROOM | Age: 73
End: 2018-05-09
Payer: MEDICARE

## 2018-05-09 VITALS
HEART RATE: 66 BPM | DIASTOLIC BLOOD PRESSURE: 59 MMHG | TEMPERATURE: 98 F | OXYGEN SATURATION: 92 % | HEIGHT: 62 IN | SYSTOLIC BLOOD PRESSURE: 125 MMHG | BODY MASS INDEX: 19.06 KG/M2 | WEIGHT: 103.6 LBS | RESPIRATION RATE: 16 BRPM

## 2018-05-09 VITALS
OXYGEN SATURATION: 98 % | DIASTOLIC BLOOD PRESSURE: 48 MMHG | RESPIRATION RATE: 10 BRPM | SYSTOLIC BLOOD PRESSURE: 90 MMHG

## 2018-05-09 PROBLEM — M79.89 SOFT TISSUE MASS: Status: ACTIVE | Noted: 2018-05-09

## 2018-05-09 PROCEDURE — 2500000003 HC RX 250 WO HCPCS: Performed by: SURGERY

## 2018-05-09 PROCEDURE — 2580000003 HC RX 258: Performed by: SURGERY

## 2018-05-09 PROCEDURE — 6360000002 HC RX W HCPCS: Performed by: NURSE ANESTHETIST, CERTIFIED REGISTERED

## 2018-05-09 PROCEDURE — 2500000003 HC RX 250 WO HCPCS: Performed by: NURSE ANESTHETIST, CERTIFIED REGISTERED

## 2018-05-09 PROCEDURE — 7100000010 HC PHASE II RECOVERY - FIRST 15 MIN: Performed by: SURGERY

## 2018-05-09 PROCEDURE — 7100000011 HC PHASE II RECOVERY - ADDTL 15 MIN: Performed by: SURGERY

## 2018-05-09 PROCEDURE — 3600000002 HC SURGERY LEVEL 2 BASE: Performed by: SURGERY

## 2018-05-09 PROCEDURE — 21931 EXC BACK LES SC 3 CM/>: CPT | Performed by: SURGERY

## 2018-05-09 PROCEDURE — 3700000000 HC ANESTHESIA ATTENDED CARE: Performed by: SURGERY

## 2018-05-09 PROCEDURE — 3700000001 HC ADD 15 MINUTES (ANESTHESIA): Performed by: SURGERY

## 2018-05-09 PROCEDURE — 3600000012 HC SURGERY LEVEL 2 ADDTL 15MIN: Performed by: SURGERY

## 2018-05-09 PROCEDURE — 88304 TISSUE EXAM BY PATHOLOGIST: CPT

## 2018-05-09 PROCEDURE — 6360000002 HC RX W HCPCS: Performed by: SURGERY

## 2018-05-09 PROCEDURE — 6370000000 HC RX 637 (ALT 250 FOR IP)

## 2018-05-09 RX ORDER — FENTANYL CITRATE 50 UG/ML
INJECTION, SOLUTION INTRAMUSCULAR; INTRAVENOUS PRN
Status: DISCONTINUED | OUTPATIENT
Start: 2018-05-09 | End: 2018-05-09 | Stop reason: SDUPTHER

## 2018-05-09 RX ORDER — ACETAMINOPHEN 325 MG/1
650 TABLET ORAL EVERY 4 HOURS PRN
Status: DISCONTINUED | OUTPATIENT
Start: 2018-05-09 | End: 2018-05-09 | Stop reason: HOSPADM

## 2018-05-09 RX ORDER — LIDOCAINE HYDROCHLORIDE 10 MG/ML
INJECTION, SOLUTION INFILTRATION; PERINEURAL PRN
Status: DISCONTINUED | OUTPATIENT
Start: 2018-05-09 | End: 2018-05-09 | Stop reason: SDUPTHER

## 2018-05-09 RX ORDER — PROPOFOL 10 MG/ML
INJECTION, EMULSION INTRAVENOUS PRN
Status: DISCONTINUED | OUTPATIENT
Start: 2018-05-09 | End: 2018-05-09 | Stop reason: SDUPTHER

## 2018-05-09 RX ORDER — DEXAMETHASONE SODIUM PHOSPHATE 4 MG/ML
INJECTION, SOLUTION INTRA-ARTICULAR; INTRALESIONAL; INTRAMUSCULAR; INTRAVENOUS; SOFT TISSUE PRN
Status: DISCONTINUED | OUTPATIENT
Start: 2018-05-09 | End: 2018-05-09 | Stop reason: SDUPTHER

## 2018-05-09 RX ORDER — SODIUM CHLORIDE 0.9 % (FLUSH) 0.9 %
10 SYRINGE (ML) INJECTION PRN
Status: DISCONTINUED | OUTPATIENT
Start: 2018-05-09 | End: 2018-05-09 | Stop reason: HOSPADM

## 2018-05-09 RX ORDER — KETOROLAC TROMETHAMINE 30 MG/ML
15 INJECTION, SOLUTION INTRAMUSCULAR; INTRAVENOUS EVERY 6 HOURS
Status: DISCONTINUED | OUTPATIENT
Start: 2018-05-09 | End: 2018-05-09 | Stop reason: HOSPADM

## 2018-05-09 RX ORDER — EPHEDRINE SULFATE 50 MG/ML
INJECTION INTRAVENOUS PRN
Status: DISCONTINUED | OUTPATIENT
Start: 2018-05-09 | End: 2018-05-09 | Stop reason: SDUPTHER

## 2018-05-09 RX ORDER — ONDANSETRON 2 MG/ML
4 INJECTION INTRAMUSCULAR; INTRAVENOUS EVERY 6 HOURS PRN
Status: DISCONTINUED | OUTPATIENT
Start: 2018-05-09 | End: 2018-05-09 | Stop reason: HOSPADM

## 2018-05-09 RX ORDER — MIDAZOLAM HYDROCHLORIDE 1 MG/ML
INJECTION INTRAMUSCULAR; INTRAVENOUS PRN
Status: DISCONTINUED | OUTPATIENT
Start: 2018-05-09 | End: 2018-05-09 | Stop reason: SDUPTHER

## 2018-05-09 RX ORDER — SODIUM CHLORIDE 0.9 % (FLUSH) 0.9 %
10 SYRINGE (ML) INJECTION EVERY 12 HOURS SCHEDULED
Status: DISCONTINUED | OUTPATIENT
Start: 2018-05-09 | End: 2018-05-09 | Stop reason: HOSPADM

## 2018-05-09 RX ORDER — SODIUM CHLORIDE 9 MG/ML
INJECTION, SOLUTION INTRAVENOUS CONTINUOUS
Status: DISCONTINUED | OUTPATIENT
Start: 2018-05-09 | End: 2018-05-09 | Stop reason: HOSPADM

## 2018-05-09 RX ORDER — IPRATROPIUM BROMIDE AND ALBUTEROL SULFATE 2.5; .5 MG/3ML; MG/3ML
SOLUTION RESPIRATORY (INHALATION)
Status: COMPLETED
Start: 2018-05-09 | End: 2018-05-09

## 2018-05-09 RX ORDER — BUPIVACAINE HYDROCHLORIDE AND EPINEPHRINE 5; 5 MG/ML; UG/ML
INJECTION, SOLUTION EPIDURAL; INTRACAUDAL; PERINEURAL PRN
Status: DISCONTINUED | OUTPATIENT
Start: 2018-05-09 | End: 2018-05-09 | Stop reason: HOSPADM

## 2018-05-09 RX ORDER — LIDOCAINE HYDROCHLORIDE 10 MG/ML
INJECTION, SOLUTION EPIDURAL; INFILTRATION; INTRACAUDAL; PERINEURAL PRN
Status: DISCONTINUED | OUTPATIENT
Start: 2018-05-09 | End: 2018-05-09 | Stop reason: HOSPADM

## 2018-05-09 RX ORDER — IPRATROPIUM BROMIDE AND ALBUTEROL SULFATE 2.5; .5 MG/3ML; MG/3ML
1 SOLUTION RESPIRATORY (INHALATION) ONCE
Status: COMPLETED | OUTPATIENT
Start: 2018-05-09 | End: 2018-05-09

## 2018-05-09 RX ORDER — CEFAZOLIN SODIUM 1 G/50ML
1 INJECTION, SOLUTION INTRAVENOUS ONCE
Status: COMPLETED | OUTPATIENT
Start: 2018-05-09 | End: 2018-05-09

## 2018-05-09 RX ORDER — ONDANSETRON 2 MG/ML
INJECTION INTRAMUSCULAR; INTRAVENOUS PRN
Status: DISCONTINUED | OUTPATIENT
Start: 2018-05-09 | End: 2018-05-09 | Stop reason: SDUPTHER

## 2018-05-09 RX ADMIN — EPHEDRINE SULFATE 10 MG: 50 INJECTION, SOLUTION INTRAVENOUS at 13:23

## 2018-05-09 RX ADMIN — EPHEDRINE SULFATE 20 MG: 50 INJECTION, SOLUTION INTRAVENOUS at 13:21

## 2018-05-09 RX ADMIN — DEXAMETHASONE SODIUM PHOSPHATE 8 MG: 4 INJECTION, SOLUTION INTRAMUSCULAR; INTRAVENOUS at 13:17

## 2018-05-09 RX ADMIN — EPHEDRINE SULFATE 10 MG: 50 INJECTION, SOLUTION INTRAVENOUS at 13:27

## 2018-05-09 RX ADMIN — SODIUM CHLORIDE: 9 INJECTION, SOLUTION INTRAVENOUS at 13:08

## 2018-05-09 RX ADMIN — ONDANSETRON 4 MG: 2 INJECTION INTRAMUSCULAR; INTRAVENOUS at 13:17

## 2018-05-09 RX ADMIN — CEFAZOLIN SODIUM 1 G: 1 INJECTION, SOLUTION INTRAVENOUS at 13:12

## 2018-05-09 RX ADMIN — IPRATROPIUM BROMIDE AND ALBUTEROL SULFATE 1 AMPULE: .5; 3 SOLUTION RESPIRATORY (INHALATION) at 12:29

## 2018-05-09 RX ADMIN — FENTANYL CITRATE 50 MCG: 50 INJECTION INTRAMUSCULAR; INTRAVENOUS at 13:15

## 2018-05-09 RX ADMIN — LIDOCAINE HYDROCHLORIDE 20 MG: 10 INJECTION, SOLUTION INFILTRATION; PERINEURAL at 13:15

## 2018-05-09 RX ADMIN — PROPOFOL 50 MG: 10 INJECTION, EMULSION INTRAVENOUS at 13:16

## 2018-05-09 RX ADMIN — PROPOFOL 50 MG: 10 INJECTION, EMULSION INTRAVENOUS at 13:15

## 2018-05-09 RX ADMIN — MIDAZOLAM HYDROCHLORIDE 2 MG: 1 INJECTION, SOLUTION INTRAMUSCULAR; INTRAVENOUS at 13:10

## 2018-05-09 RX ADMIN — IPRATROPIUM BROMIDE AND ALBUTEROL SULFATE 1 AMPULE: 2.5; .5 SOLUTION RESPIRATORY (INHALATION) at 12:29

## 2018-05-09 RX ADMIN — EPHEDRINE SULFATE 10 MG: 50 INJECTION, SOLUTION INTRAVENOUS at 13:25

## 2018-05-09 ASSESSMENT — PULMONARY FUNCTION TESTS
PIF_VALUE: 0

## 2018-05-09 ASSESSMENT — PAIN - FUNCTIONAL ASSESSMENT: PAIN_FUNCTIONAL_ASSESSMENT: 0-10

## 2018-05-09 ASSESSMENT — COPD QUESTIONNAIRES: CAT_SEVERITY: MODERATE

## 2018-05-09 ASSESSMENT — PAIN SCALES - GENERAL
PAINLEVEL_OUTOF10: 0
PAINLEVEL_OUTOF10: 0

## 2018-05-10 ENCOUNTER — TELEPHONE (OUTPATIENT)
Dept: SURGERY | Age: 73
End: 2018-05-10

## 2018-05-21 ENCOUNTER — OFFICE VISIT (OUTPATIENT)
Dept: SURGERY | Age: 73
End: 2018-05-21

## 2018-05-21 VITALS
SYSTOLIC BLOOD PRESSURE: 120 MMHG | OXYGEN SATURATION: 91 % | BODY MASS INDEX: 19.32 KG/M2 | HEART RATE: 76 BPM | DIASTOLIC BLOOD PRESSURE: 64 MMHG | HEIGHT: 62 IN | RESPIRATION RATE: 20 BRPM | WEIGHT: 105 LBS | TEMPERATURE: 97.5 F

## 2018-05-21 DIAGNOSIS — Z98.890 POST-OPERATIVE STATE: ICD-10-CM

## 2018-05-21 DIAGNOSIS — D17.1 LIPOMA OF BACK: Primary | ICD-10-CM

## 2018-05-21 PROCEDURE — 99024 POSTOP FOLLOW-UP VISIT: CPT | Performed by: SURGERY

## 2018-07-30 NOTE — PROGRESS NOTES
postradiation findings in the right breast.  In the left axilla there swelling post recent biopsy. Ultrasound had showed mild enlargement of the lymph nodes. She was referred back by Dr. Terri Tineo of radiation oncology for clinical examination. She denies any breast pain or nipple discharge. Patient has chronic COPD from long term tobacco use.         Past Medical History  Past Medical History:   Diagnosis Date    Abnormal weight gain     Adjustment disorder     Anemia     Anxiety     Cancer ([address]     COPD (chronic obstructive pulmonary disease) (HCC)     GERD (gastroesophageal reflux disease)     Heart palpitations     Hypertension     Hypokalemia     Tinea unguium        Past Surgical History  Past Surgical History:   Procedure Laterality Date    BRE[facility]SY Left 07/2018    [facility]     HERNIA REPAIR      umbilical -as a child    LUNG [facility]OPSY  2016    [facility]-    OTHER SURGICAL HISTORY      cubital tunnel release left elbow-    OTHER SURGICAL HISTORY  2000    Nu removal-    OTHER SURGICAL HISTORY  05/09/2018    excision back/lipoma    [place] MASTECTOMY, PARTIAL Right 3/15/2018    RIGHT BREAST LUMPECTOMY, SENTINAL LYMPH NODE BIOPSY performed by Martha Caldwell MD at [facility] OFFICE/OUTPT [address] [place] [address] N/A 5/9/2018    EXCISION BACK LIPOMA performed by Martha Caldwell MD at [place] OR       [facility]  Current Outpatient Prescriptions   Medication Sig Dispense Refill    varenicline (CHANTIX) 1 MG tablet Take 0.5 mg by mouth 2 times daily       aspirin 325 MG tablet Take 325 mg by mouth daily      guaiFENesin (MUCINEX) 600 MG extended release tablet Take 1,200 mg by mouth 2 times daily      nystatin (MYCOSTATIN) 133975 UNIT/ML suspension Take 500,000 Units by mouth 4 times daily      CINNAMON PO Take 1 capsule by mouth 3 times daily      Pseudoephedrine-Guaifenesin (MUCINEX D PO) Take by mouth 2 times daily      ferrous gluconate (FERGON) 324 (38 Fe) MG tablet Take 324 mg by mouth Thought content normal.   Vitals reviewed.       Lab Results   Component Value Date    WBC 6.1 03/02/2018    HGB 16.1 (H) 03/02/2018    HCT 48.5 (H) 03/02/2018     (H) 03/02/2018     (H) 03/02/2018    K 4.9 03/02/2018     03/02/2018    CREATININE 0.7 03/02/2018    BUN 16 03/02/2018    CO2 32 03/02/2018

## 2018-08-02 ENCOUNTER — OFFICE VISIT (OUTPATIENT)
Dept: SURGERY | Age: 73
End: 2018-08-02
Payer: MEDICARE

## 2018-08-02 VITALS
HEART RATE: 82 BPM | DIASTOLIC BLOOD PRESSURE: 64 MMHG | WEIGHT: 112.6 LBS | BODY MASS INDEX: 20.72 KG/M2 | OXYGEN SATURATION: 90 % | HEIGHT: 62 IN | SYSTOLIC BLOOD PRESSURE: 122 MMHG | TEMPERATURE: 97.8 F | RESPIRATION RATE: 18 BRPM

## 2018-08-02 DIAGNOSIS — R59.0 LYMPHADENOPATHY, AXILLARY: ICD-10-CM

## 2018-08-02 DIAGNOSIS — C50.911 INVASIVE DUCTAL CARCINOMA OF BREAST, FEMALE, RIGHT (HCC): Primary | ICD-10-CM

## 2018-08-02 PROCEDURE — 99214 OFFICE O/P EST MOD 30 MIN: CPT | Performed by: SURGERY

## 2018-08-02 RX ORDER — VARENICLINE TARTRATE 1 MG/1
0.5 TABLET, FILM COATED ORAL 2 TIMES DAILY
Status: ON HOLD | COMMUNITY
End: 2019-04-09

## 2018-08-03 ASSESSMENT — ENCOUNTER SYMPTOMS
WHEEZING: 1
VOICE CHANGE: 0
VOMITING: 0
TROUBLE SWALLOWING: 0
SHORTNESS OF BREATH: 1
COUGH: 1
SORE THROAT: 0
NAUSEA: 0
ABDOMINAL PAIN: 0
COLOR CHANGE: 0
BLOOD IN STOOL: 0

## 2018-10-11 ENCOUNTER — OFFICE VISIT (OUTPATIENT)
Dept: SURGERY | Age: 73
End: 2018-10-11
Payer: MEDICARE

## 2018-10-11 VITALS
BODY MASS INDEX: 23.92 KG/M2 | TEMPERATURE: 99.7 F | SYSTOLIC BLOOD PRESSURE: 126 MMHG | HEART RATE: 67 BPM | HEIGHT: 62 IN | DIASTOLIC BLOOD PRESSURE: 74 MMHG | OXYGEN SATURATION: 96 % | WEIGHT: 130 LBS | RESPIRATION RATE: 16 BRPM

## 2018-10-11 DIAGNOSIS — D17.1 LIPOMA OF BACK: ICD-10-CM

## 2018-10-11 DIAGNOSIS — I10 ESSENTIAL HYPERTENSION: ICD-10-CM

## 2018-10-11 DIAGNOSIS — C50.911 INVASIVE DUCTAL CARCINOMA OF BREAST, FEMALE, RIGHT (HCC): Primary | ICD-10-CM

## 2018-10-11 PROCEDURE — 99213 OFFICE O/P EST LOW 20 MIN: CPT | Performed by: SURGERY

## 2018-10-11 NOTE — PROGRESS NOTES
Juwan Day MD   General Surgery  Follow up Patient Evaluation in Office  Pt Name: Babak Or  Date of Birth 1945   Today's Date: 10/11/2018  Medical Record Number: [id number]  Referring Provider: No ref. provider found  Primary Care Provider: Kay Murphy. Maria E Reid MD  Chief Complaint:  Chief Complaint   Patient presents with    Check-Up     2 month fu-Invasive ductal carcinoma of breast, female, right, Lymphadenopathy, axillary-       ASSESSMENT      1. Invasive ductal carcinoma of breast, female, right ([address]    2. Lipoma of back    3. Essential hypertension    4. Pathologic stage IA     PLANS     1. Breast cancer survivorship plan discussed. 2.  Breast imaging, diagnostic, and February. 3.  Encourage monthly self breast examinations. James Estrella is a 71y.o. year old female who is presenting today in the office for follow-up evaluation of carcinoma of the right breast.   She had a lipoma removed from her right upper back in May 2018. It was benign. In February of this year she underwent a right partial mastectomy and right axillary sentinel node biopsy. For invasive ductal carcinoma of the upper outer quadrant ER/DE negative,  HER-2/joelle negative. Surgical pathology revealed a T1b invasive ductal adenocarcinoma. Margins were negative. Closest margin was deep margin 4 mm. She had 2 sentinel lymph nodes removed negative for malignancy. She completed 21 external beam radiation therapy treatments. She follows with Dr. Dickinson from medical oncology. Today in the office Dejah Rob looks great. She quit smoking 2 months ago. She states she still gets short of breath with activity but this is much improved. She denies any breast symptoms or significant tenderness. She has good range of motion of both upper extremities. She stated Dr. Dickinson is going to order breast imaging studies when she sees him next. These will be ordered for February.   She denies any breast pain or nipple discharge. She denies any new cancer diagnoses and her family.     Past Medical History  Past Medical History:   Diagnosis Date    Abnormal weight gain     Adjustment disorder     Anemia     Anxiety     Cancer ([address]     COPD (chronic obstructive pulmonary disease) (HCC)     GERD (gastroesophageal reflux disease)     Heart palpitations     Hypertension     Hypokalemia     Tinea unguium        Past Surgical History  Past Surgical History:   Procedure Laterality Date    [facility]     HERNIA REPAIR      umbilical -as a child    LUNG BIOPSY  2016    [facility]-    OTHER SURGICAL HISTORY      cubital tunnel release left elbow-    OTHER SURGICAL HISTORY  2000    Nu removal-    OTHER SURGICAL HISTORY  05/09/2018    excision back/lipoma    [place] MASTECTOMY, PARTIAL Right 3/15/2018    RIGHT BREAST LUMPECTOMY, SENTINAL LYMPH NODE BIOPSY performed by Hernando Tinoco MD at [facility]/OUTPT [address] [place] [address] N/A 5/9/2018    EXCISION BACK LIPOMA performed by Hernando Tinoco MD at [facility] OR       Medications  Current Outpatient Prescriptions   Medication Sig Dispense Refill    varenicline (CHANTIX) 1 MG tablet Take 0.5 mg by mouth 2 times daily       aspirin 325 MG tablet Take 325 mg by mouth daily      guaiFENesin (MUCINEX) 600 MG extended release tablet Take 1,200 mg by mouth 2 times daily      nystatin (MYCOSTATIN) 277693 UNIT/ML suspension Take 500,000 Units by mouth 4 times daily      CINNAMON PO Take 1 capsule by mouth 3 times daily      Pseudoephedrine-Guaifenesin (MUCINEX D PO) Take by mouth 2 times daily      ferrous gluconate (FERGON) 324 (38 Fe) MG tablet Take 324 mg by mouth daily (with breakfast)      Multiple Vitamins-Minerals (CENTRUM SILVER PO) Take 1 tablet by mouth daily      vitamin D (CHOLECALCIFEROL) 1000 UNIT TABS tablet Take 1,000 Units by mouth daily      Tiotropium Bromide Monohydrate (SPIRIVA HANDIHALER IN) Inhale into the lungs daily      Number of children: 2    Years of education: N/A     Occupational History    Not on file. Social History Main Topics    Smoking status: Former Smoker     Packs/day: 2.00    Smokeless tobacco: Never Used    Alcohol use No    Drug use: Yes     Types: Marijuana      Comment: Last use 1 month ago per patient    Sexual activity: Not on file     Other Topics Concern    Not on file     Social History Narrative    No narrative on file           Review of Systems  Review of Systems   Constitutional: Negative for chills, fatigue, fever and unexpected weight change. HENT: Positive for hearing loss and tinnitus. Negative for sore throat, trouble swallowing and voice change. Eyes: Negative for visual disturbance. Respiratory: Positive for shortness of breath. Negative for cough and wheezing. Cardiovascular: Negative for chest pain and palpitations. Gastrointestinal: Negative for abdominal pain, blood in stool, nausea and vomiting. Endocrine: Negative for cold intolerance, heat intolerance and polydipsia. Genitourinary: Negative for dysuria, flank pain and hematuria. Musculoskeletal: Positive for neck pain and neck stiffness. Negative for gait problem, joint swelling and myalgias. Skin: Negative for color change and rash. Allergic/Immunologic: Negative for immunocompromised state. Neurological: Negative for dizziness, tremors, seizures and speech difficulty. Hematological: Bruises/bleeds easily. Psychiatric/Behavioral: Negative for behavioral problems, confusion and suicidal ideas. OBJECTIVE     /74 (Site: Left Upper Arm, Position: Sitting, Cuff Size: Medium Adult)   Pulse 67   Temp 99.7 °F (37.6 °C) (Tympanic)   Resp 16   Ht 5' 2\" (1.575 m)   Wt 130 lb (59 kg)   SpO2 96%   BMI 23.78 kg/m²     Physical Exam   Constitutional: She is oriented to person, place, and time. She appears well-developed and well-nourished. HENT:   Head: Normocephalic and atraumatic.      PROC: 03/15/2018  [place]/ [facility]                                    RECV: 03/15/2018  [address]                                    RPTD: 2018  [facility], [address]                      MRN:  [id number]    LOC: OR                      ACCT: [id number]  SEX: F                      : 1945  AGE: 71 Y                         PATHOLOGY REPORT                      ATTN: LANDRY FRIEDDAVID  [facility]                  REQ: LANDRY PÉREZ        Clinical Information: RIGHT BREAST CANCER    FINAL DIAGNOSIS:  A.  Right breast lesion, lumpectomy with needle localization:   Invasive ductal adenocarcinoma, [place] score III, pT1b.   Closest margin = 4mm to deep margin.   Fibroadenomas with calcification.   Previous biopsy site changes. B.  [place] lymph nodes (2), right, resection:   Negative for malignancy (0/2). pN0 (sn). Specimen:  A) EXCISION OF BREAST, RIGHT BREAST  B) SENTINEL LYMPH NODE(S), RIGHT AXILLA      Gross Examination:  A - The container is labeled Candelario Powell, right breast tissue.   Received in formalin is a lumpectomy specimen. Bowen [place] is no  localization wire.  The specimen measures 4 cm from the medial to  lateral margin, 3 cm from the cranial to caudal margins and 2.2 cm from  the skin to deep margin.   On the skin margin is a skin ellipse  measuring 2.1 x 1.3 cm.   The skin surface is tan and intact.  There  are no lesions.   The skin margin is inked blue.  The deep margin is  inked yellow.  The caudal and lateral margins are inked black and the  cranial and medial margins are inked green.   Sections through the  specimen reveal a white fibrous cut surface.   There is a well  circumscribed nodule measuring grossly 0.7 x 0.7 x 1.5 cm.  A biopsy  site is identified.   The identified nodule is about 0.5 cm from the  skin margin which is the nearest margin.   Representative sections are  submitted.   Cassette 1 - lateral margin; cassette 2 - skin, deep and  caudal margin; cassette 3 - skin, deep and

## 2018-10-15 ASSESSMENT — ENCOUNTER SYMPTOMS
BLOOD IN STOOL: 0
WHEEZING: 0
NAUSEA: 0
COUGH: 0
SORE THROAT: 0
ABDOMINAL PAIN: 0
VOMITING: 0
COLOR CHANGE: 0
SHORTNESS OF BREATH: 1
VOICE CHANGE: 0
TROUBLE SWALLOWING: 0

## 2019-02-11 ENCOUNTER — HOSPITAL ENCOUNTER (OUTPATIENT)
Dept: WOMENS IMAGING | Age: 74
Discharge: HOME | End: 2019-02-11
Payer: MEDICARE

## 2019-02-11 DIAGNOSIS — F17.210 NICOTINE DEPENDENCE, CIGARETTES, UNCOMPLICATED: ICD-10-CM

## 2019-02-11 DIAGNOSIS — D47.3 ESSENTIAL THROMBOCYTHEMIA (HCC): ICD-10-CM

## 2019-02-11 DIAGNOSIS — C50.411 MALIGNANT NEOPLASM OF UPPER-OUTER QUADRANT OF RIGHT FEMALE BREAST, UNSPECIFIED ESTROGEN RECEPTOR STATUS (HCC): ICD-10-CM

## 2019-02-11 PROCEDURE — G0279 TOMOSYNTHESIS, MAMMO: HCPCS

## 2019-02-12 ENCOUNTER — TELEPHONE (OUTPATIENT)
Dept: SURGERY | Age: 74
End: 2019-02-12

## 2019-02-21 ENCOUNTER — OFFICE VISIT (OUTPATIENT)
Dept: SURGERY | Age: 74
End: 2019-02-21
Payer: MEDICARE

## 2019-02-21 ENCOUNTER — HOSPITAL ENCOUNTER (OUTPATIENT)
Age: 74
Discharge: HOME | End: 2019-02-21
Payer: MEDICARE

## 2019-02-21 VITALS
TEMPERATURE: 97.1 F | DIASTOLIC BLOOD PRESSURE: 62 MMHG | BODY MASS INDEX: 22.56 KG/M2 | OXYGEN SATURATION: 91 % | HEART RATE: 74 BPM | SYSTOLIC BLOOD PRESSURE: 120 MMHG | WEIGHT: 122.6 LBS | HEIGHT: 62 IN | RESPIRATION RATE: 18 BRPM

## 2019-02-21 DIAGNOSIS — I10 ESSENTIAL HYPERTENSION: ICD-10-CM

## 2019-02-21 DIAGNOSIS — D50.9 IRON DEFICIENCY ANEMIA, UNSPECIFIED IRON DEFICIENCY ANEMIA TYPE: ICD-10-CM

## 2019-02-21 DIAGNOSIS — R92.1 CALCIFICATION OF RIGHT BREAST: Primary | ICD-10-CM

## 2019-02-21 DIAGNOSIS — C50.911 INVASIVE DUCTAL CARCINOMA OF BREAST, FEMALE, RIGHT (HCC): ICD-10-CM

## 2019-02-21 DIAGNOSIS — D47.1 MYELOPROLIFERATIVE DISORDER (HCC): ICD-10-CM

## 2019-02-21 DIAGNOSIS — D75.839 THROMBOCYTOSIS: ICD-10-CM

## 2019-02-21 LAB
EKG ATRIAL RATE: 59 BPM
EKG P AXIS: 55 DEGREES
EKG P-R INTERVAL: 244 MS
EKG Q-T INTERVAL: 464 MS
EKG QRS DURATION: 100 MS
EKG QTC CALCULATION (BAZETT): 459 MS
EKG R AXIS: 83 DEGREES
EKG T AXIS: 141 DEGREES
EKG VENTRICULAR RATE: 59 BPM

## 2019-02-21 PROCEDURE — 99214 OFFICE O/P EST MOD 30 MIN: CPT | Performed by: SURGERY

## 2019-02-21 PROCEDURE — 93005 ELECTROCARDIOGRAM TRACING: CPT | Performed by: SURGERY

## 2019-02-22 PROCEDURE — 93010 ELECTROCARDIOGRAM REPORT: CPT | Performed by: INTERNAL MEDICINE

## 2019-02-22 ASSESSMENT — ENCOUNTER SYMPTOMS
ABDOMINAL PAIN: 0
BLOOD IN STOOL: 0
COLOR CHANGE: 0
TROUBLE SWALLOWING: 0
VOMITING: 0
SORE THROAT: 0
SHORTNESS OF BREATH: 0
VOICE CHANGE: 0
NAUSEA: 0
WHEEZING: 0
COUGH: 0

## 2019-03-06 ENCOUNTER — TELEPHONE (OUTPATIENT)
Dept: SURGERY | Age: 74
End: 2019-03-06

## 2019-03-29 ENCOUNTER — TELEPHONE (OUTPATIENT)
Dept: SURGERY | Age: 74
End: 2019-03-29

## 2019-04-09 ENCOUNTER — HOSPITAL ENCOUNTER (OUTPATIENT)
Age: 74
Setting detail: OUTPATIENT SURGERY
Discharge: HOME | End: 2019-04-09
Attending: SURGERY | Admitting: SURGERY
Payer: MEDICARE

## 2019-04-09 ENCOUNTER — ANESTHESIA (OUTPATIENT)
Dept: OPERATING ROOM | Age: 74
End: 2019-04-09
Payer: MEDICARE

## 2019-04-09 ENCOUNTER — HOSPITAL ENCOUNTER (OUTPATIENT)
Dept: WOMENS IMAGING | Age: 74
Discharge: HOME | End: 2019-04-09
Payer: MEDICARE

## 2019-04-09 ENCOUNTER — HOSPITAL ENCOUNTER (OUTPATIENT)
Dept: WOMENS IMAGING | Age: 74
Discharge: HOME | End: 2019-04-09
Attending: SURGERY
Payer: MEDICARE

## 2019-04-09 ENCOUNTER — ANESTHESIA EVENT (OUTPATIENT)
Dept: OPERATING ROOM | Age: 74
End: 2019-04-09
Payer: MEDICARE

## 2019-04-09 VITALS
HEIGHT: 62 IN | RESPIRATION RATE: 16 BRPM | SYSTOLIC BLOOD PRESSURE: 119 MMHG | WEIGHT: 121.8 LBS | TEMPERATURE: 97.8 F | OXYGEN SATURATION: 92 % | BODY MASS INDEX: 22.41 KG/M2 | DIASTOLIC BLOOD PRESSURE: 60 MMHG | HEART RATE: 59 BPM

## 2019-04-09 VITALS
DIASTOLIC BLOOD PRESSURE: 58 MMHG | RESPIRATION RATE: 2 BRPM | SYSTOLIC BLOOD PRESSURE: 124 MMHG | OXYGEN SATURATION: 96 %

## 2019-04-09 DIAGNOSIS — R92.1 BREAST CALCIFICATION, RIGHT: Primary | ICD-10-CM

## 2019-04-09 DIAGNOSIS — R92.1 BREAST CALCIFICATIONS: ICD-10-CM

## 2019-04-09 DIAGNOSIS — R92.1 CALCIFICATION OF RIGHT BREAST: ICD-10-CM

## 2019-04-09 PROCEDURE — 88342 IMHCHEM/IMCYTCHM 1ST ANTB: CPT

## 2019-04-09 PROCEDURE — 3600000013 HC SURGERY LEVEL 3 ADDTL 15MIN: Performed by: SURGERY

## 2019-04-09 PROCEDURE — 2500000003 HC RX 250 WO HCPCS: Performed by: ANESTHESIOLOGY

## 2019-04-09 PROCEDURE — 88307 TISSUE EXAM BY PATHOLOGIST: CPT

## 2019-04-09 PROCEDURE — 19281 PERQ DEVICE BREAST 1ST IMAG: CPT

## 2019-04-09 PROCEDURE — 7100000010 HC PHASE II RECOVERY - FIRST 15 MIN: Performed by: SURGERY

## 2019-04-09 PROCEDURE — 6360000002 HC RX W HCPCS: Performed by: ANESTHESIOLOGY

## 2019-04-09 PROCEDURE — 2709999900 HC NON-CHARGEABLE SUPPLY: Performed by: SURGERY

## 2019-04-09 PROCEDURE — C1769 GUIDE WIRE: HCPCS

## 2019-04-09 PROCEDURE — 3600000003 HC SURGERY LEVEL 3 BASE: Performed by: SURGERY

## 2019-04-09 PROCEDURE — 6360000002 HC RX W HCPCS: Performed by: SURGERY

## 2019-04-09 PROCEDURE — 2709999900 HC NON-CHARGEABLE SUPPLY

## 2019-04-09 PROCEDURE — 19125 EXCISION BREAST LESION: CPT | Performed by: SURGERY

## 2019-04-09 PROCEDURE — 76098 X-RAY EXAM SURGICAL SPECIMEN: CPT

## 2019-04-09 PROCEDURE — 2500000003 HC RX 250 WO HCPCS: Performed by: SURGERY

## 2019-04-09 PROCEDURE — 3700000001 HC ADD 15 MINUTES (ANESTHESIA): Performed by: SURGERY

## 2019-04-09 PROCEDURE — 2580000003 HC RX 258: Performed by: SURGERY

## 2019-04-09 PROCEDURE — 3700000000 HC ANESTHESIA ATTENDED CARE: Performed by: SURGERY

## 2019-04-09 PROCEDURE — 76942 ECHO GUIDE FOR BIOPSY: CPT

## 2019-04-09 PROCEDURE — 7100000011 HC PHASE II RECOVERY - ADDTL 15 MIN: Performed by: SURGERY

## 2019-04-09 RX ORDER — ONDANSETRON 2 MG/ML
INJECTION INTRAMUSCULAR; INTRAVENOUS PRN
Status: DISCONTINUED | OUTPATIENT
Start: 2019-04-09 | End: 2019-04-09 | Stop reason: SDUPTHER

## 2019-04-09 RX ORDER — SODIUM CHLORIDE 0.9 % (FLUSH) 0.9 %
10 SYRINGE (ML) INJECTION PRN
Status: DISCONTINUED | OUTPATIENT
Start: 2019-04-09 | End: 2019-04-09 | Stop reason: HOSPADM

## 2019-04-09 RX ORDER — TRAMADOL HYDROCHLORIDE 50 MG/1
50 TABLET ORAL EVERY 6 HOURS PRN
Status: DISCONTINUED | OUTPATIENT
Start: 2019-04-09 | End: 2019-04-09 | Stop reason: HOSPADM

## 2019-04-09 RX ORDER — MORPHINE SULFATE 2 MG/ML
4 INJECTION, SOLUTION INTRAMUSCULAR; INTRAVENOUS
Status: DISCONTINUED | OUTPATIENT
Start: 2019-04-09 | End: 2019-04-09 | Stop reason: HOSPADM

## 2019-04-09 RX ORDER — SODIUM CHLORIDE 9 MG/ML
INJECTION, SOLUTION INTRAVENOUS CONTINUOUS
Status: DISCONTINUED | OUTPATIENT
Start: 2019-04-09 | End: 2019-04-09 | Stop reason: HOSPADM

## 2019-04-09 RX ORDER — TRAMADOL HYDROCHLORIDE 50 MG/1
50 TABLET ORAL EVERY 4 HOURS PRN
Qty: 18 TABLET | Refills: 0 | Status: SHIPPED | OUTPATIENT
Start: 2019-04-09 | End: 2019-04-12

## 2019-04-09 RX ORDER — MORPHINE SULFATE 2 MG/ML
2 INJECTION, SOLUTION INTRAMUSCULAR; INTRAVENOUS
Status: DISCONTINUED | OUTPATIENT
Start: 2019-04-09 | End: 2019-04-09 | Stop reason: HOSPADM

## 2019-04-09 RX ORDER — SODIUM CHLORIDE 0.9 % (FLUSH) 0.9 %
10 SYRINGE (ML) INJECTION EVERY 12 HOURS SCHEDULED
Status: DISCONTINUED | OUTPATIENT
Start: 2019-04-09 | End: 2019-04-09 | Stop reason: HOSPADM

## 2019-04-09 RX ORDER — EPHEDRINE SULFATE/0.9% NACL/PF 50 MG/5 ML
SYRINGE (ML) INTRAVENOUS PRN
Status: DISCONTINUED | OUTPATIENT
Start: 2019-04-09 | End: 2019-04-09 | Stop reason: SDUPTHER

## 2019-04-09 RX ORDER — ACETAMINOPHEN 325 MG/1
650 TABLET ORAL EVERY 4 HOURS PRN
Status: DISCONTINUED | OUTPATIENT
Start: 2019-04-09 | End: 2019-04-09 | Stop reason: HOSPADM

## 2019-04-09 RX ORDER — ONDANSETRON 2 MG/ML
4 INJECTION INTRAMUSCULAR; INTRAVENOUS EVERY 6 HOURS PRN
Status: DISCONTINUED | OUTPATIENT
Start: 2019-04-09 | End: 2019-04-09 | Stop reason: HOSPADM

## 2019-04-09 RX ORDER — FENTANYL CITRATE 50 UG/ML
INJECTION, SOLUTION INTRAMUSCULAR; INTRAVENOUS PRN
Status: DISCONTINUED | OUTPATIENT
Start: 2019-04-09 | End: 2019-04-09 | Stop reason: SDUPTHER

## 2019-04-09 RX ORDER — PROPOFOL 10 MG/ML
INJECTION, EMULSION INTRAVENOUS PRN
Status: DISCONTINUED | OUTPATIENT
Start: 2019-04-09 | End: 2019-04-09 | Stop reason: SDUPTHER

## 2019-04-09 RX ORDER — GLYCOPYRROLATE 1 MG/5 ML
SYRINGE (ML) INTRAVENOUS PRN
Status: DISCONTINUED | OUTPATIENT
Start: 2019-04-09 | End: 2019-04-09 | Stop reason: SDUPTHER

## 2019-04-09 RX ADMIN — ONDANSETRON HYDROCHLORIDE 4 MG: 4 INJECTION, SOLUTION INTRAMUSCULAR; INTRAVENOUS at 14:58

## 2019-04-09 RX ADMIN — SODIUM CHLORIDE: 9 INJECTION, SOLUTION INTRAVENOUS at 12:22

## 2019-04-09 RX ADMIN — LIDOCAINE HYDROCHLORIDE 40 MG: 20 INJECTION, SOLUTION INTRAVENOUS at 14:56

## 2019-04-09 RX ADMIN — Medication 0.5 MG: at 15:10

## 2019-04-09 RX ADMIN — Medication 20 MG: at 15:19

## 2019-04-09 RX ADMIN — Medication 2 G: at 14:53

## 2019-04-09 RX ADMIN — PROPOFOL 20 MG: 10 INJECTION, EMULSION INTRAVENOUS at 14:58

## 2019-04-09 RX ADMIN — Medication 20 MG: at 15:22

## 2019-04-09 RX ADMIN — PROPOFOL 30 MG: 10 INJECTION, EMULSION INTRAVENOUS at 14:59

## 2019-04-09 RX ADMIN — FENTANYL CITRATE 50 MCG: 50 INJECTION INTRAMUSCULAR; INTRAVENOUS at 14:56

## 2019-04-09 RX ADMIN — PHENYLEPHRINE HYDROCHLORIDE 100 MCG: 10 INJECTION INTRAVENOUS at 15:27

## 2019-04-09 RX ADMIN — Medication 10 MG: at 15:24

## 2019-04-09 RX ADMIN — PROPOFOL 30 MG: 10 INJECTION, EMULSION INTRAVENOUS at 15:02

## 2019-04-09 ASSESSMENT — PULMONARY FUNCTION TESTS
PIF_VALUE: 0
PIF_VALUE: 1
PIF_VALUE: 0
PIF_VALUE: 3
PIF_VALUE: 0
PIF_VALUE: 22
PIF_VALUE: 0
PIF_VALUE: 0
PIF_VALUE: 1
PIF_VALUE: 1
PIF_VALUE: 0
PIF_VALUE: 1
PIF_VALUE: 2
PIF_VALUE: 1
PIF_VALUE: 24
PIF_VALUE: 1
PIF_VALUE: 21
PIF_VALUE: 1
PIF_VALUE: 2
PIF_VALUE: 1
PIF_VALUE: 0
PIF_VALUE: 1
PIF_VALUE: 1
PIF_VALUE: 18
PIF_VALUE: 0
PIF_VALUE: 0
PIF_VALUE: 1
PIF_VALUE: 0
PIF_VALUE: 0
PIF_VALUE: 1
PIF_VALUE: 23
PIF_VALUE: 1
PIF_VALUE: 0
PIF_VALUE: 1
PIF_VALUE: 0
PIF_VALUE: 19
PIF_VALUE: 1
PIF_VALUE: 0
PIF_VALUE: 1
PIF_VALUE: 3

## 2019-04-09 ASSESSMENT — PAIN SCALES - GENERAL
PAINLEVEL_OUTOF10: 0

## 2019-04-09 ASSESSMENT — LIFESTYLE VARIABLES: SMOKING_STATUS: 1

## 2019-04-09 ASSESSMENT — COPD QUESTIONNAIRES: CAT_SEVERITY: MODERATE

## 2019-04-09 NOTE — ANESTHESIA PRE PROCEDURE
Department of Anesthesiology  Preprocedure Note       Name:  Coco Jackson   Age:  68 y.o.  :  1945                                          MRN:  [id number]         Date:  2019      Surgeon: Salome Smiley):  Katy Sargent MD    Procedure: RIGHT BREAST EXCISIONAL BX, PREOP NEEDLE LOC (Right Breast)    Medications prior to admission:   Prior to Admission medications    Medication Sig Start Date End Date Taking?  Authorizing Provider   TiZANidine HCl (ZANAFLEX PO) Take by mouth   Yes Historical Provider, MD   NONFORMULARY performersist inhaler as needed for wheezing,shortness of breath   Yes Historical Provider, MD   Hydroxyurea (HYDREA PO) Take by mouth daily   Yes Historical Provider, MD   ALLOPURINOL PO Take by mouth daily   Yes Historical Provider, MD   Atorvastatin Calcium (LIPITOR PO) Take by mouth daily   Yes Historical Provider, MD   guaiFENesin (MUCINEX) 600 MG extended release tablet Take 1,200 mg by mouth 2 times daily   Yes Historical Provider, MD   nystatin (MYCOSTATIN) 184946 UNIT/ML suspension Take 500,000 Units by mouth 4 times daily   Yes Historical Provider, MD   CINNAMON PO Take 1 capsule by mouth 3 times daily   Yes Historical Provider, MD   ferrous gluconate (FERGON) 324 (38 Fe) MG tablet Take 324 mg by mouth daily (with breakfast)   Yes Historical Provider, MD   Multiple Vitamins-Minerals (CENTRUM SILVER PO) Take 1 tablet by mouth daily   Yes Historical Provider, MD   vitamin D (CHOLECALCIFEROL) 1000 UNIT TABS tablet Take 1,000 Units by mouth daily   Yes Historical Provider, MD   Tiotropium Bromide Monohydrate (SPIRIVA HANDIHALER IN) Inhale into the lungs daily   Yes Historical Provider, MD   budesonide (PULMICORT) 0.5 MG/2ML nebulizer suspension Take 1 ampule by nebulization 2 times daily   Yes Historical Provider, MD   verapamil (VERELAN) 240 MG extended release capsule Take 240 mg by mouth nightly   Yes Historical Provider, MD   montelukast (SINGULAIR) 10 MG tablet Take 10 mg by mouth nightly   Yes Historical Provider, MD   ALPRAZolam (XANAX) 0.5 MG tablet Take 0.5 mg by mouth 3 times daily as needed for Sleep. Yes Historical Provider, MD   hydrochlorothiazide (MICROZIDE) 12.5 MG capsule Take 12.5 mg by mouth every morning   Yes Historical Provider, MD   benzonatate (TESSALON) 100 MG capsule Take 100 mg by mouth 3 times daily as needed for Cough   Yes Historical Provider, MD   albuterol sulfate  (90 Base) MCG/ACT inhaler Inhale 2 puffs into the lungs every 4 hours as needed for Wheezing   Yes Historical Provider, MD   sennosides-docusate sodium (SENOKOT-S) 8.6-50 MG tablet Take 1 tablet by mouth daily as needed for Constipation   Yes Historical Provider, MD   NONFORMULARY Eye drops as needed FOR DRY EYE   Yes Historical Provider, MD   fluticasone (FLONASE) 50 MCG/ACT nasal spray 1 spray by Nasal route daily as needed for Rhinitis   Yes Historical Provider, MD   gabapentin (NEURONTIN) 100 MG capsule Take 100 mg by mouth 3 times daily. Yes Historical Provider, MD   aspirin 325 MG tablet Take 325 mg by mouth daily Stopped on 2/18/19    Historical Provider, MD       Current medications:    Current Facility-Administered Medications   Medication Dose Route Frequency Provider Last Rate Last Dose    0.9 % sodium chloride infusion   Intravenous Continuous Titi Marcano  mL/hr at 04/09/19 1222      sodium chloride flush 0.9 % injection 10 mL  10 mL Intravenous 2 times per day Titi Marcano MD        sodium chloride flush 0.9 % injection 10 mL  10 mL Intravenous PRN Titi Marcano MD        ceFAZolin (ANCEF) 2 g in dextrose 5 % 50 mL IVPB  2 g Intravenous On Call to [address] [place] [address] [place], [place]           Allergies:     Allergies   Allergen Reactions    Brethine [Terbutaline] Itching    Norco [Hydrocodone-Acetaminophen] Itching       Problem List:    Patient Active Problem List   Diagnosis Code    Malignant neoplasm of upper-outer quadrant of right breast in female, estrogen receptor negative ([facility] [address] Encounters:   04/09/19 121 lb 12.8 oz (55.2 kg)   02/21/19 122 lb 9.6 oz (55.6 kg)   10/11/18 130 lb (59 kg)     Body mass index is 22.28 kg/m². CBC:   Lab Results   Component Value Date    WBC 6.1 03/02/2018    RBC 5.17 03/02/2018    HGB 16.1 03/02/2018    HCT 48.5 03/02/2018    MCV 93.9 03/02/2018    RDW 15.9 03/02/2018     03/02/2018       CMP:   Lab Results   Component Value Date     03/02/2018    K 4.9 03/02/2018     03/02/2018    CO2 32 03/02/2018    BUN 16 03/02/2018    CREATININE 0.7 03/02/2018    LABGLOM 82 03/02/2018    GLUCOSE 99 03/02/2018    CALCIUM 9.3 03/02/2018       POC Tests: No results for input(s): POCGLU, POCNA, POCK, POCCL, POCBUN, POCHEMO, POCHCT in the last 72 hours. Coags: No results found for: PROTIME, INR, APTT    HCG (If Applicable): No results found for: PREGTESTUR, PREGSERUM, HCG, HCGQUANT     ABGs: No results found for: PHART, PO2ART, NWF1OWY, VOI9AQR, BEART, F7LWUHFS     Type & Screen (If Applicable):  No results found for: LABABO, [address]    Anesthesia Evaluation  Patient summary reviewed and Nursing notes reviewed no history of anesthetic complications:   Airway: Mallampati: II  TM distance: >3 FB   Neck ROM: full  Mouth opening: > = 3 FB Dental:          Pulmonary:   (+) COPD: moderate,  decreased breath sounds,  wheezes,  current smoker          Patient did not smoke on day of surgery. Cardiovascular:  Exercise tolerance: good (>4 METS),   (+) hypertension:,       ECG reviewed                        Neuro/Psych:   (+) psychiatric history:            GI/Hepatic/Renal:   (+) GERD:,           Endo/Other: Negative Endo/Other ROS             Pt had no PAT visit       Abdominal:           Vascular: negative vascular ROS. Anesthesia Plan      general     ASA 3       Induction: intravenous. MIPS: Postoperative opioids intended and Prophylactic antiemetics administered.   Anesthetic plan and risks discussed with patient. Plan discussed with LOKI.                 Keyon Gomez, DO   4/9/2019

## 2019-04-09 NOTE — PROGRESS NOTES
Discharge criteria met.  Discharge instructions given to the patient and verbalized understanding of the discharge instructions

## 2019-04-09 NOTE — H&P
moderate suspicion for malignancy. These appear to be just posterior to her lumpectomy site. Margins were negative on her lumpectomy specimen. She has no current breast symptoms. Mother and sister history of breast cancer. Patient declined genetic testing. Past Medical History   Past Medical History                                                                                           Past Surgical History   Past Surgical History                                                                                                                      Medications   Current Facility-Administered Medications                                                                                                                                                                                                                                             Allergies        Allergies   Allergen Reactions    Brethine [Terbutaline] Itching    Norco [Hydrocodone-Acetaminophen] Itching     Family History   Family History                                                                                           SocialHistory   Social History                                                                                                                                                                            Review of Systems   Review of Systems   Constitutional: Positive for fatigue. Negative for chills, fever and unexpected weight change. HENT: Negative for sore throat, trouble swallowing and voice change. Eyes: Negative for visual disturbance. Respiratory: Negative for cough, shortness of breath and wheezing. Cardiovascular: Negative for chest pain and palpitations. Gastrointestinal: Negative for abdominal pain, blood in stool, nausea and vomiting. Endocrine: Negative for cold intolerance, heat intolerance and polydipsia. Genitourinary: Negative for dysuria, flank pain and hematuria.    Musculoskeletal: Negative for gait problem, joint swelling and myalgias. Skin: Negative for color change and rash. Allergic/Immunologic: Negative for immunocompromised state. Neurological: Negative for dizziness, tremors, seizures and speech difficulty. Hematological: Does not bruise/bleed easily. Psychiatric/Behavioral: Negative for behavioral problems, confusion and suicidal ideas. OBJECTIVE   /62 (Site: Right Upper Arm, Position: Sitting, Cuff Size: Medium Adult)  Pulse 74  Temp 97.1 °F (36.2 °C) (Tympanic)  Resp 18  Ht 5' 2\" (1.575 m)  Wt 122 lb 9.6 oz (55.6 kg)  SpO2 91%  BMI 22.42 kg/m²   Physical Exam   Constitutional: She is oriented to person, place, and time. She appears well-developed and well-nourished. No distress. Thin   HENT:   Head: Normocephalic and atraumatic. Mouth/Throat: Oropharynx is clear and moist.   Eyes: Pupils are equal, round, and reactive to light. EOM are normal. No scleral icterus. Neck: Neck supple. No JVD present. No tracheal deviation present. Cardiovascular: Normal rate and normal heart sounds. Pulmonary/Chest: Effort normal and breath sounds normal. No respiratory distress. She has no wheezes. She exhibits no tenderness. Right breast exhibits no inverted nipple, no mass, no nipple discharge and no skin change. Left breast exhibits no inverted nipple, no mass, no nipple discharge and no skin change. Abdominal: Soft. She exhibits no distension and no mass. There is no tenderness. Musculoskeletal: She exhibits no edema or deformity. Lymphadenopathy:   She has no cervical adenopathy. Right cervical: No superficial cervical, no deep cervical and no posterior cervical adenopathy present. Left cervical: No superficial cervical, no deep cervical and no posterior cervical adenopathy present. She has no axillary adenopathy. Right axillary: No pectoral and no lateral adenopathy present. Left axillary: No pectoral and no lateral adenopathy present.   Neurological: She is alert and oriented to person, place, and time. No cranial nerve deficit. Skin: Skin is warm and dry. No rash noted. She is not diaphoretic. Psychiatric: She has a normal mood and affect. Her behavior is normal. Thought content normal.   Vitals reviewed. Lab Results   Component Value Date    WBC 6.1 03/02/2018    HGB 16.1 (H) 03/02/2018    HCT 48.5 (H) 03/02/2018     (H) 03/02/2018     (H) 03/02/2018    K 4.9 03/02/2018     03/02/2018    CREATININE 0.7 03/02/2018    BUN 16 03/02/2018    CO2 32 03/02/2018        Narrative      IMPRESSION: Mammogram BI-RADS: 4C: High suspicion for malignancy      1. The clustered fine calcifications in the right breast appear    to have a moderate suspicion for malignancy. A stereotactic    biopsy is recommended. 2. Results discussed with the patient and the biopsy was    scheduled. The patient was placed on the stereotactic table and cannot    tolerate a stereotactic biopsy. The patient wishes to have an    excisional biopsy by Dr. Cronin [facility]. #[id number] - [facility] DIGITAL DIAGNOSTIC W OR WO CAD BILATERAL   BILATERAL DIGITAL DIAGNOSTIC MAMMOGRAM 3D/2D WITH CAD: 2/11/2019   CLINICAL: Tomosynthesis. Right breast cancer. First degree family   history of breast cancer. Meets criteria for genetic evaluation    and has been offered information for possible referral.       Comparison is made to exams dated: 2/21/2018 mammogram and    2/13/2018 mammogram - [facility]. The tissue of both breasts is heterogeneously dense. This may    lower the sensitivity of mammography. Current study was also evaluated with a Computer Aided Detection    (CAD) system. There are benign scattered calcifications both breasts. There    also are benign vascular calcifications left breast.    Additionally, there are post operative findings right breast.    There are clustered fine calcifications in the right breast upper   outer aspect posterior depth. No other significant masses, calcifications, or other findings    are seen in either breast.             Paul Walls M.D.    pgk/:2019 16:26:57    copy to: Nam Montes MD, [facility], ph:    [phone], fax: [phone]   copy to: Vivian Livingston M.D., ph: [phone], fax: [phone]      Imaging Technologist: Teena JARAMILLO(R)(M), [facility]   letter sent: Additional Tests Needed            [facility] Pick a StudentENEGG II.[facility] [place], [place] [id number]  [facility]oc[facility] [place] [facility] [place], [address]  PROC: 03/15/2018  [facility] RECV: 03/15/2018  [address] [employer] [address] RPTD: 2018  MARYELLENManagerCompleteCY RUIZ Pick a StudentENEACSIAN.ELIKEERTEL, [address]  MRN: [id number] LOC: OR  ACCT: [de-identified] SEX: F  : 1945 AGE: 71 Y    PATHOLOGY REPORT  ATTN: Curtistine Brain OLT  REQWyn Bohr OLT        Clinical Information: RIGHT BREAST CANCER    FINAL DIAGNOSIS:  A. Right breast lesion, lumpectomy with needle localization:  Invasive ductal adenocarcinoma, Sam score III, pT1b. Closest margin = 4mm to deep margin. Fibroadenomas with calcification. Previous biopsy site changes. B. [place] lymph nodes (2), right, resection:  Negative for malignancy (0/2). pN0 (sn). Specimen:  A) EXCISION OF BREAST, RIGHT BREAST  B) SENTINEL LYMPH NODE(S), RIGHT AXILLA      Gross Examination:  A - The container is labeled Melita Lee, right breast tissue. Received in formalin is a lumpectomy specimen. There is no  localization wire. The specimen measures 4 cm from the medial to  lateral margin, 3 cm from the cranial to caudal margins and 2.2 cm from  the skin to deep margin. On the skin margin is a skin ellipse  measuring 2.1 x 1.3 cm. The skin surface is tan and intact. There  are no lesions. The skin margin is inked blue. The deep margin is  inked yellow. The caudal and lateral margins are inked black and the  cranial and medial margins are inked green. Sections through the  specimen reveal a white fibrous cut surface.  There is a well  circumscribed nodule measuring grossly 0.7 x 0.7 x 1.5 cm. A biopsy  site is identified. The identified nodule is about 0.5 cm from the  skin margin which is the nearest margin. Representative sections are  submitted. Cassette 1 - lateral margin; cassette 2 - skin, deep and  caudal margin; cassette 3 - skin, deep and cranial margin; cassette 4 -  skin, deep and caudal margin with nodule; cassette 5 - skin, deep and  cranial margin; cassette 6 - skin, deep and caudal margin; cassette 7-  skin, deep and cranial margin; cassette 8 - medial margin. ss.    Fixative: 10% neutral buffered formalin  Tissue removal time: 1322  Tissue fixation time: 1326  Total fixation time: 30 hours and 34 minutes    B - The container is labeled Genny Castillo, right axilla sentinel  nodes. Received in formalin are two lymph nodes measuring 1.2 and 1.7  cm. The smaller node is bisected and entirely submitted in cassette  #1. A larger node is bisected and entirely submitted in cassette #2.  ns. SHERICE/ENEIDAR:lgl    Fixative: 10% neutral buffered formalin  Tissue removal time: 1256  Tissue fixation time: 1317  Total fixation time: 30 hours and 43 minutes    Microscopic Examination:  A. Procedure  Excision (less than total mastectomy)    Specimen Laterality  Right    Tumor Size  reatest dimension of largest invasive focus >1 mm (specify exact  measurement) (millimeters): 8 mm    Histologic Type  Invasive carcinoma of no special type (ductal, not otherwise  specified),  The tumor is also slightly circumscribed and papillary and may arise in  a papillary carcinoma.     Histologoc Grade (Sam Histologic Score)  Glandular (Acinar)/Tubular Differentiation  Score 2 (10% to 75% of tumor area forming glandular/tubular  structures)    Nuclear Pleomorphism  Score 3 (vesicular nuclei, often with prominent nucleoli, exhibiting  marked variation in size and shape, occasionally with very large and  bizarre forms)  Mitotic Rate  Score 3 (>=8 mitoses per mm2) (see Table 1)  + Number of mitoses per 10 high-power fields: 10  + Diameter of microscope field (millimeters): mm    Overall Grade  Grade 3 (scores of 8 or 9)    + Tumor Focality  Single focus of invasive carcinoma    Ductal Carcinoma In Situ (DCIS)  No DCIS in specimen    + Lobular Carcinoma In Situ (LCIS)  No LCIS in specimen    Margins  Invasive Carcinoma Margins (required only if residual invasive  carcinoma is present in specimen)  Uninvolved by invasive carcinoma  Distance from closest margin (millimeters): 4 mm to deep margin. Regional Lymph Nodes  Lymph Node Examination (required only if lymph nodes are present in the  specimen)  Number of Lymph Nodes with Macrometastases (>2 mm): 0  Number of Lymph Nodes with Micrometastases (>0.2 mm to 2 mm and/or >200  cells):0  Number of Lymph Nodes with Isolated Tumor Cells (<=0.2 mm and <=200  cells)#: 0    Number of Lymph Nodes Examined: 2  Number of [place] Nodes Examined (required only if sentinel nodes are  present): 2    Treatment Effect : No known presurgical therapy    + Lymphovascular Invasion: Not identified    Primary Tumor (Invasive Carcinoma) (pT)  pT1: Tumor <=20 mm in greatest dimension  pT1b: Tumor >5 mm but <=10 mm in greatest dimension    Regional Lymph Nodes (pN)  Modifier  (sn): [place] node(s) evaluated. If 6 or more nodes (sentinel or  nonsentinel) are removed, this odifier should not be used.   Category (pN)  pN0: No regional lymph node metastasis identified or ITCs only#    Ancillary Studies -[id number], date of service 2/21/2018  Estrogen Receptor: (Clone SP1), [employer]  (x) Negative ( < 1% of cells staining)    Progesterone Receptor: (Clone 1E2), [employer]  (x) Negative ( < 1% of cells staining)    Ki-67 (clone 30-9)  Percentage of positive nuclei: 50%  Unfavorable > 20%    Inform HER-2 Dual JIGNA ([employer])  (x) Nonamplified - HER2/CEP17 ratio < 2.0 AND average HER2 copy  number < 4.0 signals/cell    Number of observers: 1  Number of invasive tumor cells counted: 20  Using dual probe assay:  Average number of HER2 signals per cell: 1.8  Average number of CEP17 signals per cell: 1.9  HER2/CEP17 ratio: 0.9    Her-2/joelle (Immunohistochemistry - Clone 4B5, [employer])  (x) Not performed    External Controls: (x) Adequate Internal Controls: (x) Adequate  Standard Assay Conditions: (x) Met  Staining Method Used:  Formalin fixation  Antigen retrieval type: Cell Conditioning 1, mild hesham  Time in antigen retrieval: 30 minutes  Detection system type: DAB Ultraview kit    Comment: This patient has had multiple previous biopsies. The current  specimen demonstrates areas of fibroadenomatous change with  calcification and previous biopsy site. A p63 immunostain* is  performed on block A1 and demonstrates intact staining consistent with  areas of adenosis. The controls are excellent. *This test was developed and its performance characteristics determined  by [facility]. It has not been cleared or  approved by the U.S. Food and Drug Administration. Pursuant to the  requirements of CLIA, this laboratory has established and verified the  test's accuracy and precision. Additional information about this type  of test is available upon request.    B. Multiple levels of the two sentinel lymph nodes are evaluated. There is no evidence of malignancy. [id number]  [id number]      <Sign Out Dr. Matthew Painter M.D., F.C.A.P.       [facility] Printed on: 3/19/2018  [address] [employer] [facility]  Original print date: 03/19/2018   Lab and Collection   Surgical Pathology on 3/15/2018

## 2019-04-09 NOTE — BRIEF OP NOTE
Brief Postoperative Note  ______________________________________________________________    Patient: Rosamaria Hughes  YOB: 1945  MRN: [id number]  Date of Procedure: 4/9/2019    Pre-Op Diagnosis: right breast calcifications    Post-Op Diagnosis: Same       Procedure(s):  RIGHT BREAST EXCISIONAL BX, PREOP NEEDLE LOC    Anesthesia: General    Surgeon(s):  Tiago Lee MD    Assistant:     Estimated Blood Loss (mL): less than 50     Complications: None    Specimens:   ID Type Source Tests Collected by Time Destination   A : Right breast biopsy palps mass tissue surronding wire Tissue Breast SURGICAL PATHOLOGY Tiago Lee MD 4/9/2019 1515        Implants:  * No implants in log *      Drains: * No LDAs found *    Findings:     Tiago Lee MD  Date: 4/9/2019  Time: 3:36 PM

## 2019-04-10 ENCOUNTER — TELEPHONE (OUTPATIENT)
Dept: SURGERY | Age: 74
End: 2019-04-10

## 2019-04-10 NOTE — OP NOTE
[address] [id number]                                OPERATIVE REPORT    PATIENT NAME: Kofi Mahmood                   :        1945  MED REC NO:   [id number]                           ROOM:  ACCOUNT NO:   [de-identified]                           ADMIT DATE: 2019  PROVIDER:     Danie Cortés M.D.    DATE OF PROCEDURE:  2019    PREOPERATIVE DIAGNOSES:  Microcalcifications, suspicious upper outer  quadrant, right breast; history of right breast cancer, triple negative  in 2018. POSTOPERATIVE DIAGNOSES:   Microcalcifications, suspicious upper outer  quadrant, right breast; history of right breast cancer, triple negative  in 2018. Final pathology pending. PROCEDURE:  Needle localization, biopsy of the right breast  calcifications. The patient is not able to tolerate stereotactic core  biopsy. INDICATION:  See history and physical examination. PROCEDURE:  The patient was brought to the operating room after needle  localization procedure was performed at the [facility]. She was given sedation per the anesthesia department. She was given  Ancef intravenously. The right breast and wire were prepped and draped. After infiltrating her old incision in the upper outer quadrant with  0.5% Marcaine and lidocaine, a skin incision was made. Lumpectomy  surrounding the wire was performed. Specimen radiograph revealed the  calcifications within the specimen. Hemostasis was achieved with  electrocautery. Dermis was closed with interrupted Vicryl suture and  skin was closed with running subcuticular 4-0 Monocryl suture. The  patient remained hemodynamically stable and was transported back to  same-day surgery in stable condition. Miles Franks M.D.    D: 2019 15:43:30       T: 2019 15:45:51     [email or web address]  Job#: [id number]     Doc#: [id number]    CC:  Pati Wu.  Kathryn Drew M.D. Wilmer Doyle M.D.

## 2019-04-10 NOTE — ANESTHESIA POSTPROCEDURE EVALUATION
Department of Anesthesiology  Postprocedure Note    Patient: Danny Reddy  MRN: [id number]  YOB: 1945  Date of evaluation: 4/9/2019  Time:  8:22 PM     Procedure Summary     Date:  04/09/19 Room / Location:  [address] CHRISSY [place]    Anesthesia Start:  9458 Anesthesia Stop:  0848    Procedure:  RIGHT BREAST EXCISIONAL BX, PREOP NEEDLE LOC (Right Breast) Diagnosis:  (right breast calcifications)    Surgeon:  Danie Cortés MD Responsible Provider:  Manuel Andrews DO    Anesthesia Type:  general ASA Status:  3          Anesthesia Type: general    Stephon Phase I: Stephon Score: 10    Stephon Phase II: Stephon Score: 10    Last vitals: Reviewed and per EMR flowsheets.        Anesthesia Post Evaluation    Patient location during evaluation: bedside  Patient participation: complete - patient participated  Level of consciousness: awake and alert  Pain score: 0  Airway patency: patent  Nausea & Vomiting: no nausea and no vomiting  Complications: no  Cardiovascular status: hemodynamically stable and blood pressure returned to baseline  Respiratory status: spontaneous ventilation, room air and acceptable  Hydration status: stable

## 2019-04-10 NOTE — TELEPHONE ENCOUNTER
Called to check on pt post op. Pt states doing pretty good. Pain tolerable. Taking pain medication prn. Swelling minimal. Denies any nausea or vomiting. Urinating fine. Denies any needs at this time.

## 2019-04-15 ENCOUNTER — OFFICE VISIT (OUTPATIENT)
Dept: SURGERY | Age: 74
End: 2019-04-15

## 2019-04-15 VITALS
WEIGHT: 118 LBS | BODY MASS INDEX: 20.14 KG/M2 | TEMPERATURE: 99.1 F | HEART RATE: 88 BPM | HEIGHT: 64 IN | DIASTOLIC BLOOD PRESSURE: 80 MMHG | SYSTOLIC BLOOD PRESSURE: 150 MMHG | RESPIRATION RATE: 18 BRPM

## 2019-04-15 DIAGNOSIS — Z98.890 POSTOPERATIVE STATE: ICD-10-CM

## 2019-04-15 DIAGNOSIS — R92.1 CALCIFICATION OF RIGHT BREAST: Primary | ICD-10-CM

## 2019-04-15 DIAGNOSIS — C50.911 INVASIVE DUCTAL CARCINOMA OF BREAST, FEMALE, RIGHT (HCC): ICD-10-CM

## 2019-04-15 PROCEDURE — 99024 POSTOP FOLLOW-UP VISIT: CPT | Performed by: SURGERY

## 2019-04-15 NOTE — PROGRESS NOTES
Amrik Jennings MD   General Surgery  Postprocedure Evaluation in Office  Pt Name: Patsi Denver  Date of Birth 1945   Today's Date: 4/15/2019  Medical Record Number: [id number]  Primary Care Provider: Tomasa Young. Joel Jordan MD  Chief Complaint   Patient presents with    Post-Op Check     s/p Needle localization, biopsy of the right breast calcifications 4/9/19     ASSESSMENT      1. Calcification of right breast    2. Postoperative state    3. Invasive ductal carcinoma of breast, female, right ([address]         PLAN       1. Encourage monthly self breast examinations. 2.  Follow-up surgical clinic in 6 months   3. Pathology discussed with patient benign. Rukhsana Arce is seen today for post-op follow-up. She had a lumpectomy for calcifications of the right breast.  Previous history of right breast carcinoma in the same quadrant. Pathology benign containing calcifications. Patient denies any wound issues.   Medications    Current Outpatient Medications:     TiZANidine HCl (ZANAFLEX PO), Take by mouth, Disp: , Rfl:     NONFORMULARY, performersist inhaler as needed for wheezing,shortness of breath, Disp: , Rfl:     Hydroxyurea (HYDREA PO), Take by mouth daily, Disp: , Rfl:     ALLOPURINOL PO, Take by mouth daily, Disp: , Rfl:     Atorvastatin Calcium (LIPITOR PO), Take by mouth daily, Disp: , Rfl:     aspirin 325 MG tablet, Take 325 mg by mouth daily Stopped on 2/18/19, Disp: , Rfl:     guaiFENesin (MUCINEX) 600 MG extended release tablet, Take 1,200 mg by mouth 2 times daily, Disp: , Rfl:     nystatin (MYCOSTATIN) 552131 UNIT/ML suspension, Take 500,000 Units by mouth 4 times daily, Disp: , Rfl:     CINNAMON PO, Take 1 capsule by mouth 3 times daily, Disp: , Rfl:     ferrous gluconate (FERGON) 324 (38 Fe) MG tablet, Take 324 mg by mouth daily (with breakfast), Disp: , Rfl:     Multiple Vitamins-Minerals (CENTRUM SILVER PO), Take 1 tablet by mouth daily, Disp: , Rfl:     vitamin D (CHOLECALCIFEROL) 1000 UNIT TABS tablet, Take 1,000 Units by mouth daily, Disp: , Rfl:     Tiotropium Bromide Monohydrate (Constancia Lecher IN), Inhale into the lungs daily, Disp: , Rfl:     budesonide (PULMICORT) 0.5 MG/2ML nebulizer suspension, Take 1 ampule by nebulization 2 times daily, Disp: , Rfl:     verapamil (VERELAN) 240 MG extended release capsule, Take 240 mg by mouth nightly, Disp: , Rfl:     montelukast (SINGULAIR) 10 MG tablet, Take 10 mg by mouth nightly, Disp: , Rfl:     ALPRAZolam (XANAX) 0.5 MG tablet, Take 0.5 mg by mouth 3 times daily as needed for Sleep., Disp: , Rfl:     hydrochlorothiazide (MICROZIDE) 12.5 MG capsule, Take 12.5 mg by mouth every morning, Disp: , Rfl:     benzonatate (TESSALON) 100 MG capsule, Take 100 mg by mouth 3 times daily as needed for Cough, Disp: , Rfl:     albuterol sulfate  (90 Base) MCG/ACT inhaler, Inhale 2 puffs into the lungs every 4 hours as needed for Wheezing, Disp: , Rfl:     sennosides-docusate sodium (SENOKOT-S) 8.6-50 MG tablet, Take 1 tablet by mouth daily as needed for Constipation, Disp: , Rfl:     NONFORMULARY, Eye drops as needed FOR DRY EYE, Disp: , Rfl:     fluticasone (FLONASE) 50 MCG/ACT nasal spray, 1 spray by Nasal route daily as needed for Rhinitis, Disp: , Rfl:     gabapentin (NEURONTIN) 100 MG capsule, Take 100 mg by mouth 3 times daily. , Disp: , Rfl:     Allergies  Allergies   Allergen Reactions    Brethine [Terbutaline] Itching    Norco [Hydrocodone-Acetaminophen] Itching       Review of Systems  History obtained from the patient. Constitutional: Denies any fever, chills, fatigue. Wound: Denies any rash, skin color changes or wound problems. Resp: Denies any cough, +shortness of breath. CV: Denies any chest pain, orthopnea or syncope.    OBJECTIVE     VITALS: BP (!) 150/80 (Site: Right Upper Arm, Position: Sitting, Cuff Size: Medium Adult)   Pulse 88   Temp 99.1 °F (37.3 °C) (Tympanic)   Resp 18   Ht 5' 4\" (1.626 m)   Wt 118 lb (53.5 kg)   Breastfeeding? No   BMI 20.25 kg/m²     CONSTITUTIONAL: Alert and oriented times 3, no acute distress and cooperative to examination. SKIN: Skin color, texture, turgor normal. No rashes or lesions. INCISION: wound margins intact and healing well. No signs of infection. No drainage. No hematoma or significant seroma. LUNGS: Lungs Clear  CARDIOVASCULAR: Normal Rate    Performed by: [facility]      [place], [place]                 [phone]  Assoc.                                              Page 1 of 1  [address]  Kelly Sol, [address]                                                      PROC: 2019  [facility]                                    RECV: 04/10/2019  [address]                                    RPTD: 04/15/2019  Kelly Sol [address]                      MRN:  [id number]    LOC: OR                      ACCT: [id number]  SEX: F                      : 1945  AGE: 68 Y                         PATHOLOGY REPORT                      ATTN: LANDRY BETO  [de-identified]                  REQ: LANDRY BETO        Clinical Information: RIGHT BREAST CALCIFICATIONS    FINAL DIAGNOSIS:  Breast, right, \"palps mass surrounding wire\", lumpectomy:      Hyalinized fibroadenoma.      Fibrocystic changes including dense fibrosis, sclerosing adenosis,      and usual ductal hyperplasia with microcalcifications.   Negative for malignancy.     Specimen:  EXCISION OF BREAST, RIGHT,MASS, PALPS, SURROUNDING WIRE      Gross Examination:  [facility].  Received in formalin is an  oriented lumpectomy specimen with a needle localization wire.  There  are only lateral, caudal, and deep markers present. Trevin Cerise is an area  of interest inked blue on the caudal margin.  The remainder of the  caudal margin is inked black.  The lateral and medial margins are inked  orange.  The cranial margin is inked green.  The deep and skin margins  are inked yellow.  Specimen is totally sectioned to reveal a  ill-defined white area of fibrosis measuring 2.5 x 0.9 x 0.8 cm.  The  area of fibrosis abuts the cranial and caudal margins.  Representative  sections are submitted as follows:  A1 - caudal and cranial margins; A2  - perpendicular skin margin; A3 - perpendicular deep margin; A4 -  perpendicular cranial and caudal margins; A5 - perpendicular lateral  margin; and A6 - perpendicular medial margin.  SIO:[id number]    Fixative:  10% neutral buffered formalin  Tissue removal time: 1515  Tissue fixation time: 1530  Total fixation time:  28 hours and 30 minutes    Microscopic Examination:  Sections demonstrate breast tissue with fibrocystic changes including  sclerosing adenosis, dense fibrosis, and usual ductal hyperplasia. There are associated microcalcifications. Gradie Bran are several areas of  well-circumscribed epithelial proliferation.  Immunohistochemistry for  p40 is performed on blocks A1, A4, and A5 with appropriate controls. The areas of interest demonstrate intact p40 staining consistent with  sclerosing adenosis. Gradie Bran is a hyalinized fibroadenoma present and  changes consistent with the patient's history of previous lumpectomy. In situ and invasive carcinoma is not identified. Sanchez Du and  radiologic correlation is recommended. *This test was developed and its performance characteristics determined  by [address] [place] [address] has not been cleared or  approved by the U.S. Food and Drug Administration. Pursuant to the  requirements of CLIA, this laboratory has established and verified the  test's accuracy and precision.  Additional information about this type  of test is available upon request.    [id number]  [id number]                                                        <Sign Out Dr. Mindy Olivo M.D., F.C.A.P.       [facility]/ [facility]  Printed on:  4/15/2019  [address], [address]  Original print date: 04/15/2019

## 2019-10-16 ENCOUNTER — HOSPITAL ENCOUNTER (OUTPATIENT)
Dept: WOMENS IMAGING | Age: 74
Discharge: HOME | End: 2019-10-16
Payer: MEDICARE

## 2019-10-16 DIAGNOSIS — C50.411 MALIGNANT NEOPLASM OF UPPER-OUTER QUADRANT OF RIGHT FEMALE BREAST, UNSPECIFIED ESTROGEN RECEPTOR STATUS (HCC): ICD-10-CM

## 2019-10-16 DIAGNOSIS — D47.3 ESSENTIAL THROMBOCYTHEMIA (HCC): ICD-10-CM

## 2019-10-16 PROCEDURE — G0279 TOMOSYNTHESIS, MAMMO: HCPCS

## 2019-11-18 ENCOUNTER — OFFICE VISIT (OUTPATIENT)
Dept: SURGERY | Age: 74
End: 2019-11-18
Payer: MEDICARE

## 2019-11-18 VITALS
RESPIRATION RATE: 20 BRPM | WEIGHT: 118.8 LBS | SYSTOLIC BLOOD PRESSURE: 120 MMHG | HEART RATE: 80 BPM | HEIGHT: 62 IN | OXYGEN SATURATION: 92 % | TEMPERATURE: 98.6 F | DIASTOLIC BLOOD PRESSURE: 64 MMHG | BODY MASS INDEX: 21.86 KG/M2

## 2019-11-18 DIAGNOSIS — D50.9 IRON DEFICIENCY ANEMIA, UNSPECIFIED IRON DEFICIENCY ANEMIA TYPE: ICD-10-CM

## 2019-11-18 DIAGNOSIS — I10 ESSENTIAL HYPERTENSION: ICD-10-CM

## 2019-11-18 DIAGNOSIS — C50.911 INVASIVE DUCTAL CARCINOMA OF BREAST, FEMALE, RIGHT (HCC): Primary | ICD-10-CM

## 2019-11-18 DIAGNOSIS — D47.1 MYELOPROLIFERATIVE DISORDER (HCC): ICD-10-CM

## 2019-11-18 PROCEDURE — 99213 OFFICE O/P EST LOW 20 MIN: CPT | Performed by: SURGERY

## 2019-11-18 RX ORDER — CYCLOBENZAPRINE HCL 5 MG
5 TABLET ORAL 3 TIMES DAILY PRN
COMMUNITY
End: 2020-05-18 | Stop reason: ALTCHOICE

## 2019-11-18 ASSESSMENT — ENCOUNTER SYMPTOMS
SORE THROAT: 0
VOMITING: 0
TROUBLE SWALLOWING: 0
ABDOMINAL PAIN: 0
COLOR CHANGE: 0
VOICE CHANGE: 0
BLOOD IN STOOL: 0
SHORTNESS OF BREATH: 1
NAUSEA: 0

## 2019-11-19 ASSESSMENT — ENCOUNTER SYMPTOMS
WHEEZING: 1
COUGH: 1
CONSTIPATION: 1
EYE REDNESS: 1

## 2020-05-17 ASSESSMENT — ENCOUNTER SYMPTOMS
VOICE CHANGE: 0
NAUSEA: 0
TROUBLE SWALLOWING: 0
SORE THROAT: 0
COUGH: 1
COLOR CHANGE: 0
CONSTIPATION: 1
WHEEZING: 1
SHORTNESS OF BREATH: 1
BLOOD IN STOOL: 0
ABDOMINAL PAIN: 0
VOMITING: 0

## 2020-05-17 NOTE — PROGRESS NOTES
calcifications. Jessica Boyd continues to do well. She has no breast complaints. Today on clinical breast examination no suspicious findings. Last mammogram recommended 6-month follow-up which is scheduled in June. Benign-appearing calcifications. She follows with Dr. Deon Watkins from an oncology standpoint for her myeloproliferative disorder. She has lost some weight states she is taking human growth hormone which she cleared through her primary care provider. Mother and sister history of postmenopausal breast cancer. Patient declined genetic testing.     Past Medical History  Past Medical History:   Diagnosis Date    Abnormal weight gain     Adjustment disorder     Anemia     Anxiety     Cancer (HCC)     breast    COPD (chronic obstructive pulmonary disease) (HCC)     GERD (gastroesophageal reflux disease)     Heart palpitations     Hypertension     Hypokalemia     Tinea unguium        Past Surgical History  Past Surgical History:   Procedure Laterality Date    BREAST BIOPSY Left 07/2018    [facility]     BREAST SURGERY Right 4/9/2019    RIGHT BREAST EXCISIONAL BX, PREOP NEEDLE LOC performed by Jerrell Flores [facility] at [facility].      umbilical -as a child    LUNG BIOPSY  2016    [facility]-    OTHER SURGICAL HISTORY      cubital tunnel release left elbow-    OTHER SURGICAL HISTORY  2000    Nu removal-    OTHER SURGICAL HISTORY  05/09/2018    excision back/lipoma    OR MASTECTOMY, PARTIAL Right 3/15/2018    RIGHT BREAST LUMPECTOMY, SENTINAL LYMPH NODE BIOPSY performed by Jerrell Flores MD at [address] [facility] OFFICE/OUTPT [facility] N/A 5/9/2018    EXCISION BACK LIPOMA performed by Jerrell Flores MD at [facility] OR       Medications  Current Outpatient Medications   Medication Sig Dispense Refill    Biotin 2500 MCG CAPS Take by mouth      rivaroxaban (XARELTO) 2.5 MG TABS tablet Take 2.5 mg by mouth 2 times daily      NONFORMULARY performersist inhaler as needed for wheezing,shortness of breath      Hydroxyurea (HYDREA PO) Take by mouth daily      ALLOPURINOL PO Take by mouth daily      Atorvastatin Calcium (LIPITOR PO) Take by mouth daily      aspirin 325 MG tablet Take 325 mg by mouth daily Stopped on 2/18/19      guaiFENesin (MUCINEX) 600 MG extended release tablet Take 1,200 mg by mouth 2 times daily      nystatin (MYCOSTATIN) 940493 UNIT/ML suspension Take 500,000 Units by mouth 4 times daily      CINNAMON PO Take 1 capsule by mouth 3 times daily      ferrous gluconate (FERGON) 324 (38 Fe) MG tablet Take 324 mg by mouth daily (with breakfast)      Multiple Vitamins-Minerals (CENTRUM SILVER PO) Take 1 tablet by mouth daily      vitamin D (CHOLECALCIFEROL) 1000 UNIT TABS tablet Take 1,000 Units by mouth daily      Tiotropium Bromide Monohydrate (SPIRIVA HANDIHALER IN) Inhale into the lungs daily      budesonide (PULMICORT) 0.5 MG/2ML nebulizer suspension Take 1 ampule by nebulization 2 times daily      verapamil (VERELAN) 240 MG extended release capsule Take 240 mg by mouth nightly      montelukast (SINGULAIR) 10 MG tablet Take 10 mg by mouth nightly      ALPRAZolam (XANAX) 0.5 MG tablet Take 0.5 mg by mouth 3 times daily as needed for Sleep.  hydrochlorothiazide (MICROZIDE) 12.5 MG capsule Take 12.5 mg by mouth every morning      benzonatate (TESSALON) 100 MG capsule Take 100 mg by mouth 3 times daily as needed for Cough      albuterol sulfate  (90 Base) MCG/ACT inhaler Inhale 2 puffs into the lungs every 4 hours as needed for Wheezing      sennosides-docusate sodium (SENOKOT-S) 8.6-50 MG tablet Take 1 tablet by mouth daily as needed for Constipation      NONFORMULARY Eye drops as needed FOR DRY EYE      fluticasone (FLONASE) 50 MCG/ACT nasal spray 1 spray by Nasal route daily as needed for Rhinitis      gabapentin (NEURONTIN) 100 MG capsule Take 100 mg by mouth 3 times daily.        No current facility-administered medications for this staining consistent with  sclerosing adenosis. Juwan Cee is a hyalinized fibroadenoma present and  changes consistent with the patient's history of previous lumpectomy. In situ and invasive carcinoma is not identified. Santiago Jean-Baptiste and  radiologic correlation is recommended. *This test was developed and its performance characteristics determined  by [address] [place] [address] has not been cleared or  approved by the U.S. Food and Drug Administration. Pursuant to the  requirements of CLIA, this laboratory has established and verified the  test's accuracy and precision.  Additional information about this type  of test is available upon request.    [id number]  [id number]                                                        <Sign Out Dr. Aravind Parry M.D., F.C.A.P. [facility]/ [address] [address]  Printed on:  4/15/2019  [address]  [facility] ANDREEA  [facility].CBOY, [address]  Original print date: 04/15/2019            IMPRESSION: Mammogram BI-RADS: 3: Probably Benign       The multiple punctate calcifications in the right breast appear    probably benign and may represent dystrophic calcifications.  A    follow-up in 6 months is recommended.         A follow-up right mammogram in 6 months is recommended to    demonstrate stability.     The patient has been or will be contacted.         #[id number] - [facility] [place] DIGITAL DIAGNOSTIC UNILATERAL RIGHT   UNILATERAL RIGHT DIGITAL DIAGNOSTIC MAMMOGRAM 3D/2D WITH CAD:    10/16/2019   CLINICAL: Tomosynthesis.  Right breast cancer.         Comparison is made to exams dated:  2/11/2019 mammogram,    2/21/2018 mammogram, and 2/13/2018 mammogram - [facility].     The tissue of the right breast is heterogeneously dense.  This may    lower the sensitivity of mammography.     Current study was also evaluated with a Computer Aided Detection    (CAD) system.         There are multiple punctate calcifications in the right breast

## 2020-05-18 ENCOUNTER — OFFICE VISIT (OUTPATIENT)
Dept: SURGERY | Age: 75
End: 2020-05-18
Payer: MEDICARE

## 2020-05-18 VITALS
HEIGHT: 62 IN | BODY MASS INDEX: 20.98 KG/M2 | WEIGHT: 114 LBS | DIASTOLIC BLOOD PRESSURE: 84 MMHG | HEART RATE: 84 BPM | SYSTOLIC BLOOD PRESSURE: 132 MMHG | TEMPERATURE: 97.2 F | RESPIRATION RATE: 18 BRPM | OXYGEN SATURATION: 93 %

## 2020-05-18 PROCEDURE — 99214 OFFICE O/P EST MOD 30 MIN: CPT | Performed by: SURGERY

## 2020-05-18 RX ORDER — BIOTIN 2500 MCG
CAPSULE ORAL
COMMUNITY

## 2020-05-19 ASSESSMENT — ENCOUNTER SYMPTOMS
EYE REDNESS: 0
CHEST TIGHTNESS: 0

## 2020-10-20 ENCOUNTER — HOSPITAL ENCOUNTER (OUTPATIENT)
Dept: WOMENS IMAGING | Age: 75
Discharge: HOME | End: 2020-10-20
Payer: MEDICARE

## 2020-10-20 PROCEDURE — 77063 BREAST TOMOSYNTHESIS BI: CPT

## 2020-10-27 ENCOUNTER — HOSPITAL ENCOUNTER (OUTPATIENT)
Dept: WOMENS IMAGING | Age: 75
Discharge: HOME | End: 2020-10-27
Payer: MEDICARE

## 2020-10-27 PROCEDURE — 3209999900 MAM COMPARISON OF OUTSIDE IMAGES

## 2021-02-26 ENCOUNTER — TELEPHONE (OUTPATIENT)
Dept: CASE MANAGEMENT | Age: 76
End: 2021-02-26

## 2021-02-26 DIAGNOSIS — C50.411 MALIGNANT NEOPLASM OF UPPER-OUTER QUADRANT OF RIGHT BREAST IN FEMALE, ESTROGEN RECEPTOR NEGATIVE (HCC): Primary | ICD-10-CM

## 2021-02-26 DIAGNOSIS — Z17.1 MALIGNANT NEOPLASM OF UPPER-OUTER QUADRANT OF RIGHT BREAST IN FEMALE, ESTROGEN RECEPTOR NEGATIVE (HCC): Primary | ICD-10-CM

## 2021-02-26 NOTE — TELEPHONE ENCOUNTER
Name: Oma Silva  : 1945  MRN: [id number]    Oncology Navigation Follow-Up Note    Contact Type:  Telephone    Notes: Called patient re: request for genetic evaluation. Breast Cancer Risk Assessment form reviewed from [facility] visit. Verbalizes understanding of telephone appointment with Annalisa East genetic counselor in [facility], first available April/May timeframe, and that genetic office will call with appointment date/time. Called Dr. John Cadet office for referral order. Will follow. Personal/Family history verified as: Personal history of triple negative breast cancer, age 71; mother breast cancer age 28-36; sister breast cancer age 39.      Electronically signed by Jyoti Gaytan RN on 2021 at 10:16 AM

## 2021-04-06 ENCOUNTER — TELEPHONE (OUTPATIENT)
Dept: SURGERY | Age: 76
End: 2021-04-06

## 2021-06-10 ENCOUNTER — OFFICE VISIT (OUTPATIENT)
Dept: SURGERY | Age: 76
End: 2021-06-10
Payer: MEDICARE

## 2021-06-10 VITALS
BODY MASS INDEX: 20.91 KG/M2 | TEMPERATURE: 96.5 F | DIASTOLIC BLOOD PRESSURE: 65 MMHG | OXYGEN SATURATION: 92 % | SYSTOLIC BLOOD PRESSURE: 117 MMHG | RESPIRATION RATE: 16 BRPM | HEART RATE: 71 BPM | HEIGHT: 62 IN | WEIGHT: 113.6 LBS

## 2021-06-10 DIAGNOSIS — Z17.1 MALIGNANT NEOPLASM OF UPPER-OUTER QUADRANT OF RIGHT BREAST IN FEMALE, ESTROGEN RECEPTOR NEGATIVE (HCC): Primary | ICD-10-CM

## 2021-06-10 DIAGNOSIS — C50.411 MALIGNANT NEOPLASM OF UPPER-OUTER QUADRANT OF RIGHT BREAST IN FEMALE, ESTROGEN RECEPTOR NEGATIVE (HCC): Primary | ICD-10-CM

## 2021-06-10 PROCEDURE — 99214 OFFICE O/P EST MOD 30 MIN: CPT | Performed by: SURGERY

## 2021-06-10 RX ORDER — APIXABAN 5 MG/1
5 TABLET, FILM COATED ORAL 2 TIMES DAILY
COMMUNITY
Start: 2021-04-19

## 2021-06-10 RX ORDER — MULTIVIT WITH MINERALS/LUTEIN
250 TABLET ORAL DAILY
COMMUNITY

## 2021-06-10 ASSESSMENT — ENCOUNTER SYMPTOMS
ABDOMINAL PAIN: 0
BLOOD IN STOOL: 0
NAUSEA: 0
VOICE CHANGE: 0
SORE THROAT: 0
VOMITING: 0
TROUBLE SWALLOWING: 0
COUGH: 1
SHORTNESS OF BREATH: 1
WHEEZING: 1
CONSTIPATION: 1
CHEST TIGHTNESS: 0
COLOR CHANGE: 0
EYE REDNESS: 0

## 2021-06-10 NOTE — PROGRESS NOTES
Yuridia Coppola MD   General Surgery  Follow up Patient Evaluation in Office  Pt Name: Damaris Reeder  Date of Birth 1945   Today's Date: 6/10/2021  Medical Record Number: [id number]  Referring Provider: No ref. provider found  Primary Care Provider: Steven Wheat. Kike Kaur MD  Chief Complaint:  Chief Complaint   Patient presents with    1 Year Follow Up     Invasive ductal carcinoma of right breast--mammogram scheduled 10/21       ASSESSMENT      1. Malignant neoplasm of upper-outer quadrant of right breast in female, estrogen receptor negative ([address]      Pathologic stage IB   Triple negative  2. COPD  PLANS     1. Clinical breast examination benign  2. Breast imaging, 10/28/2020 no suspicious findings. Continue yearly mammography. Imaging reviewed. 3.  Encourage monthly self breast examinations. Call for any changes. 4.  Follow-up surgical clinic 1 year. Call in interim with any questions or concerns. 5.  Breast cancer survivorship plan discussed with patient. Patient continues to follow with Dr. Alejandro Estes oncology. CA 27-29 is normal.  SUBJECTIVE   History of present illness:  Francheska Moreira is a 77y.o. year old female who is presenting today in the office for follow-up evaluation of carcinoma of the right breast.    In February 2018 she underwent a right partial mastectomy and right axillary sentinel node biopsy. For invasive ductal carcinoma of the upper outer quadrant ER/[place] negative,  HER-2/joelle negative. Surgical pathology revealed a T1b invasive ductal adenocarcinoma. Margins were negative. Closest margin was deep margin 4 mm. She had 2 sentinel lymph nodes removed negative for malignancy. She completed a course of external beam radiation therapy treatments. She declined adjuvant chemotherapy which was offered. She follows with Dr. Beata Can from medical oncology.   Earlier this year she had diagnostic mammography performed which showed new microcalcifications in the right breast with moderate by Job Soto MD at [facility] OR       Medications  Current Outpatient Medications   Medication Sig Dispense Refill    ELIQUIS 5 MG TABS tablet Take 5 mg by mouth 2 times daily      Zinc Acetate, Oral, (ZINC ACETATE PO) Take by mouth      Ascorbic Acid (VITAMIN C) 250 MG tablet Take 250 mg by mouth daily      NONFORMULARY Neuroflow plus      Biotin 2500 MCG CAPS Take by mouth      Hydroxyurea (HYDREA PO) Take by mouth daily      ALLOPURINOL PO Take by mouth daily      Atorvastatin Calcium (LIPITOR PO) Take by mouth daily      guaiFENesin (MUCINEX) 600 MG extended release tablet Take 1,200 mg by mouth 2 times daily      nystatin (MYCOSTATIN) 866917 UNIT/ML suspension Take 500,000 Units by mouth 4 times daily      CINNAMON PO Take 1 capsule by mouth 3 times daily      ferrous gluconate (FERGON) 324 (38 Fe) MG tablet Take 324 mg by mouth daily (with breakfast)      Multiple Vitamins-Minerals (CENTRUM SILVER PO) Take 1 tablet by mouth daily      vitamin D (CHOLECALCIFEROL) 1000 UNIT TABS tablet Take 1,000 Units by mouth daily      budesonide (PULMICORT) 0.5 MG/2ML nebulizer suspension Take 1 ampule by nebulization 2 times daily      verapamil (VERELAN) 240 MG extended release capsule Take 240 mg by mouth nightly      montelukast (SINGULAIR) 10 MG tablet Take 10 mg by mouth nightly      ALPRAZolam (XANAX) 0.5 MG tablet Take 0.5 mg by mouth 3 times daily as needed for Sleep.       hydrochlorothiazide (MICROZIDE) 12.5 MG capsule Take 12.5 mg by mouth every morning      benzonatate (TESSALON) 100 MG capsule Take 100 mg by mouth 3 times daily as needed for Cough      albuterol sulfate  (90 Base) MCG/ACT inhaler Inhale 2 puffs into the lungs every 4 hours as needed for Wheezing      sennosides-docusate sodium (SENOKOT-S) 8.6-50 MG tablet Take 1 tablet by mouth daily as needed for Constipation      NONFORMULARY Eye drops as needed FOR DRY EYE      fluticasone (FLONASE) 50 MCG/ACT nasal spray 1 spray by Nasal route daily as needed for Rhinitis      gabapentin (NEURONTIN) 100 MG capsule Take 100 mg by mouth 3 times daily.  rivaroxaban (XARELTO) 2.5 MG TABS tablet Take 2.5 mg by mouth 2 times daily (Patient not taking: Reported on 6/10/2021)      NONFORMULARY performersist inhaler as needed for wheezing,shortness of breath (Patient not taking: Reported on 6/10/2021)      aspirin 325 MG tablet Take 325 mg by mouth daily Stopped on 2/18/19 (Patient not taking: Reported on 6/10/2021)      Tiotropium Bromide Monohydrate (93537 The Filter) Inhale into the lungs daily (Patient not taking: Reported on 6/10/2021)       No current facility-administered medications for this visit.      Allergies  Allergies   Allergen Reactions    Brethine [Terbutaline] Itching    Norco [Hydrocodone-Acetaminophen] Itching       Family History  Family History   Problem Relation Age of Onset    Cancer Mother         lung    Breast Cancer Mother         age 28-44    Other Father         leukemia    Other Sister         breast fibrosis    Other Brother         suicide    Diabetes Maternal Grandmother     Breast Cancer Sister 39       Social History  Social History     Socioeconomic History    Marital status:      Spouse name: Not on file    Number of children: 2    Years of education: Not on file    Highest education level: Not on file   Occupational History    Occupation: retired   Tobacco Use    Smoking status: Current Every Day Smoker     Packs/day: 2.00    Smokeless tobacco: Never Used   Vaping Use    Vaping Use: Never used   Substance and Sexual Activity    Alcohol use: No    Drug use: Yes     Types: Marijuana    Sexual activity: Not on file   Other Topics Concern    Not on file   Social History Narrative    Not on file     Social Determinants of Health     Financial Resource Strain:     Difficulty of Paying Living Expenses:    Food Insecurity:     Worried About Running Out of [place] in the Last Year:    [address] in the Last Year:    Transportation Needs:     Lack of Transportation (Medical):  Lack of Transportation (Non-Medical):    Physical Activity:     Days of Exercise per Week:     Minutes of Exercise per Session:    Stress:     Feeling of Stress :    Social Connections:     Frequency of Communication with Friends and Family:     Frequency of Social Gatherings with Friends and Family:     Attends Sikh Services:     Active Member of Clubs or Organizations:     Attends Club or Organization Meetings:     Marital Status:    Intimate Partner Violence:     Fear of Current or Ex-Partner:     Emotionally Abused:     Physically Abused:     Sexually Abused:            Review of Systems  Review of Systems   Constitutional: Negative for activity change, chills, fatigue and unexpected weight change. Losing weight taking HGH   HENT: Positive for hearing loss and tinnitus. Negative for sore throat, trouble swallowing and voice change. Eyes: Negative for redness and visual disturbance. Respiratory: Positive for cough, shortness of breath and wheezing. Negative for chest tightness. Cardiovascular: Negative for chest pain and palpitations. Gastrointestinal: Positive for constipation. Negative for abdominal pain, blood in stool, nausea and vomiting. Endocrine: Negative for cold intolerance, heat intolerance and polydipsia. Genitourinary: Negative for dysuria, flank pain and hematuria. Musculoskeletal: Negative for gait problem, joint swelling, myalgias, neck pain and neck stiffness. Skin: Negative for color change and rash. Allergic/Immunologic: Negative for immunocompromised state. Neurological: Positive for numbness. Negative for dizziness, tremors, seizures, speech difficulty and light-headedness. Hematological: Negative for adenopathy. Bruises/bleeds easily. Psychiatric/Behavioral: Negative for behavioral problems and confusion.  The patient is nervous/anxious. OBJECTIVE     /65 (Site: Left Upper Arm, Position: Sitting, Cuff Size: Medium Adult)   Pulse 71   Temp 96.5 °F (35.8 °C) (Tympanic)   Resp 16   Ht 5' 2\" (1.575 m)   Wt 113 lb 9.6 oz (51.5 kg)   SpO2 92%   BMI 20.78 kg/m²     Physical Exam  Vitals reviewed. Constitutional:       General: She is not in acute distress. Appearance: Normal appearance. She is well-developed. She is not diaphoretic. Comments: thin   HENT:      Head: Normocephalic and atraumatic. Eyes:      General: No scleral icterus. Extraocular Movements: Extraocular movements intact. Pupils: Pupils are equal, round, and reactive to light. Neck:      Vascular: No JVD. Trachea: No tracheal deviation. Cardiovascular:      Rate and Rhythm: Normal rate and regular rhythm. Heart sounds: Normal heart sounds. Pulmonary:      Effort: Pulmonary effort is normal. No respiratory distress. Breath sounds: Normal breath sounds. No wheezing. Chest:      Chest wall: No tenderness. Breasts:         Right: No inverted nipple, mass, nipple discharge, skin change or tenderness. Left: No inverted nipple, mass, nipple discharge, skin change or tenderness. Abdominal:      General: There is no distension. Palpations: Abdomen is soft. There is no mass. Tenderness: There is no abdominal tenderness. Musculoskeletal:         General: No deformity. Normal range of motion. Cervical back: Neck supple. Lymphadenopathy:      Cervical: No cervical adenopathy. Right cervical: No superficial, deep or posterior cervical adenopathy. Left cervical: No superficial, deep or posterior cervical adenopathy. Upper Body:      Right upper body: No supraclavicular, axillary or pectoral adenopathy. Left upper body: No supraclavicular, axillary or pectoral adenopathy. Skin:     General: Skin is warm and dry. Coloration: Skin is not jaundiced.       Findings: No bruising or rash. Neurological:      Mental Status: She is alert and oriented to person, place, and time. Cranial Nerves: No cranial nerve deficit. Psychiatric:         Behavior: Behavior normal.         Thought Content: Thought content normal.         Lab Results   Component Value Date    WBC 6.1 2018    HGB 16.1 (H) 2018    HCT 48.5 (H) 2018     (H) 2018    ALT 14 10/04/2019     10/04/2019    K 4.1 10/04/2019     10/04/2019    CREATININE 0.71 10/04/2019    BUN 13 10/04/2019    CO2 34 (H) 10/04/2019       3/19/2018  2:51 PM - Oc, Michel Catalan Incoming Lab Results From Soft     Narrative     [address] [place] [address] Pathology      SONIA PRITCHARD                 [id number]  Assoc.                                              Page 1 of 1  [address] [place] [id number]                                                      PROC: 03/15/2018  [facility]                                    RECV: 03/15/2018  [address]                                    RPTD: 2018  [address] [place] [address], [address]                      MRN:  [id number]    LOC: OR                      ACCT: [id number]  SEX: F                      : 1945  AGE: 71 Y                         PATHOLOGY REPORT                      ATTN: LANDRY PÉREZ  [de-identified]                  REQ: LANDRY PÉREZ        Clinical Information: RIGHT BREAST CANCER    FINAL DIAGNOSIS:  A.  Right breast lesion, lumpectomy with needle localization:   Invasive ductal adenocarcinoma, [place] score III, pT1b.   Closest margin = 4mm to deep margin.   Fibroadenomas with calcification.   Previous biopsy site changes. B.  [place] lymph nodes (2), right, resection:   Negative for malignancy (0/2). pN0 (sn). Specimen:  A) EXCISION OF BREAST, RIGHT BREAST  B) SENTINEL LYMPH NODE(S), RIGHT AXILLA      Gross Examination:  A - The container is labeled Natalia Owens, right breast tissue. Received in formalin is a lumpectomy specimen. Hugh Richardson is no  localization wire.  The specimen measures 4 cm from the medial to  lateral margin, 3 cm from the cranial to caudal margins and 2.2 cm from  the skin to deep margin.   On the skin margin is a skin ellipse  measuring 2.1 x 1.3 cm.   The skin surface is tan and intact.  There  are no lesions.   The skin margin is inked blue.  The deep margin is  inked yellow.  The caudal and lateral margins are inked black and the  cranial and medial margins are inked green.   Sections through the  specimen reveal a white fibrous cut surface.   There is a well  circumscribed nodule measuring grossly 0.7 x 0.7 x 1.5 cm.  A biopsy  site is identified.   The identified nodule is about 0.5 cm from the  skin margin which is the nearest margin.   Representative sections are  submitted.   Cassette 1 - lateral margin; cassette 2 - skin, deep and  caudal margin; cassette 3 - skin, deep and cranial margin; cassette 4 -  skin, deep and caudal margin with nodule; cassette 5 - skin, deep and  cranial margin; cassette 6 - skin, deep and caudal margin; cassette 7-  skin, deep and cranial margin; cassette 8 - medial margin.   ss. Fixative:  10% neutral buffered formalin  Tissue removal time: 1322  Tissue fixation time: 1326  Total fixation time:  30 hours and 34 minutes    B - The container is labeled Natalia Roman, right axilla sentinel  nodes.  Received in formalin are two lymph nodes measuring 1.2 and 1.7  cm.   The smaller node is bisected and entirely submitted in cassette  #1.  A larger node is bisected and entirely submitted in cassette #2.  ns.   SHERICE/WALTER:lgl    Fixative:  10% neutral buffered formalin  Tissue removal time: 1256  Tissue fixation time: 1317  Total fixation time: 30 hours and 43 minutes    Microscopic Examination:  A.  Procedure   Excision (less than total mastectomy)    Specimen Laterality  Right    Tumor Size  reatest dimension of largest invasive focus >1 mm (specify exact  measurement) (millimeters): 8 mm    Histologic Type  Invasive carcinoma of no special type (ductal, not otherwise  specified),  The tumor is also slightly circumscribed and papillary and may arise in  a papillary carcinoma. Histologoc Grade (Seguin Histologic Score)  Glandular (Acinar)/Tubular Differentiation   Score 2 (10% to 75% of tumor area forming glandular/tubular  structures)    Nuclear Pleomorphism   Score 3 (vesicular nuclei, often with prominent nucleoli, exhibiting     marked variation in size and shape, occasionally with very large and     bizarre forms)  Mitotic Rate   Score 3 (>=8 mitoses per mm2) (see Table 1)  + Number of mitoses per 10 high-power fields: 10  + Diameter of microscope field (millimeters):  mm    Overall Grade   Grade 3 (scores of 8 or 9)    + Tumor Focality  Single focus of invasive carcinoma    Ductal Carcinoma In Situ (DCIS)   No DCIS in specimen    + Lobular Carcinoma In Situ (LCIS)  No LCIS in specimen    Margins  Invasive Carcinoma Margins (required only if residual invasive  carcinoma is present in specimen)   Uninvolved by invasive carcinoma   Distance from closest margin (millimeters): 4 mm  to deep margin. Regional Lymph Nodes  Lymph Node Examination (required only if lymph nodes are present in the  specimen)  Number of Lymph Nodes with Macrometastases (>2 mm): 0  Number of Lymph Nodes with Micrometastases (>0.2 mm to 2 mm and/or >200  cells):0  Number of Lymph Nodes with Isolated Tumor Cells (<=0.2 mm and <=200  cells)#: 0    Number of Lymph Nodes Examined: 2  Number of [place] Nodes Examined (required only if sentinel nodes are  present): 2    Treatment Effect : No known presurgical therapy    + Lymphovascular Invasion: Not identified    Primary Tumor (Invasive Carcinoma) (pT)   pT1:  Tumor <=20 mm in greatest dimension       pT1b: Tumor >5 mm but <=10 mm in greatest dimension    Regional Lymph Nodes (pN)  Modifier  (sn): [place] node(s) evaluated.  If 6 or more nodes (sentinel or            nonsentinel) are removed, this odifier should not be used. Category (pN)   pN0: No regional lymph node metastasis identified or ITCs only#    Ancillary Studies -[id number], date of service 2/21/2018  Estrogen Receptor: (Clone SP1), [employer]       (x) Negative ( < 1% of cells staining)    Progesterone Receptor: (Clone 1E2), [employer]       (x) Negative ( < 1% of cells staining)    Ki-67 (clone 30-9)       Percentage of positive nuclei:  50%               Unfavorable  > 20%    Inform HER-2 Dual JIGNA ([employer] )  (x)  Nonamplified - HER2/CEP17 ratio  < 2.0 AND average HER2 copy  number  < 4.0 signals/cell    Number of observers:  1  Number of invasive tumor cells counted:  20  Using dual probe assay:   Average number of HER2 signals per cell:  1.8   Average number of CEP17 signals per cell:  1.9   HER2/CEP17 ratio:  0.9    Her-2/joelle (Immunohistochemistry - Clone 4B5, [employer])       (x) Not performed    External Controls:  (x)  Adequate     Internal Controls:  (x)  Adequate     Standard Assay Conditions:  (x)  Met  Staining Method Used:   Formalin fixation   Antigen retrieval type:  Cell Conditioning 1, mild hesham   Time in antigen retrieval:  30 minutes   Detection system type:   DAB Ultraview kit    Comment: This patient has had multiple previous biopsies.  The current  specimen demonstrates areas of fibroadenomatous change with  calcification and previous biopsy site.  A p63 immunostain* is  performed on block A1 and demonstrates intact staining consistent with  areas of adenosis.  The controls are excellent. *This test was developed and its performance characteristics determined  by [address] [place] [address] has not been cleared or  approved by the U.S. Food and Drug Administration.  Pursuant to the  requirements of CLIA, this laboratory has established and verified the  test's accuracy and precision.  Additional information about this type  of test is available upon

## 2021-10-21 ENCOUNTER — HOSPITAL ENCOUNTER (OUTPATIENT)
Dept: WOMENS IMAGING | Age: 76
Discharge: HOME | End: 2021-10-21
Payer: MEDICARE

## 2021-10-21 DIAGNOSIS — Z12.31 VISIT FOR SCREENING MAMMOGRAM: ICD-10-CM

## 2021-10-21 PROCEDURE — 77063 BREAST TOMOSYNTHESIS BI: CPT

## 2021-11-02 NOTE — PROGRESS NOTES
Name: Ruby Collins  : 1945  MRN: [id number]    Oncology Navigation Follow-Up Note    Contact Type: [facility]    Subjective: Arrived with daughter for needle     Objective: Dr. Prashant King consented per protocol. Assistance Needed: Expressed confusion about surgery not being at 1030. Attempted to explain that wire placement was for that time and surgery is planned for 230 pm. Upset stating well my daughter will have to leave because she has a child getting off the bus at that time. Emotional support offered and daughter is trying to make other arrangements. Receptive to Advanced Care Planning / Palliative Care:  N/A    Referrals: None    Education: Needle localization, excisional biopsy    Notes: Teaching completed and verbalizes understanding. Tearful about pain with mammogram compression during procedure. Reassurance given. Wire placed by Dr. Prashant King and tolerated well. Secured wire with gauze and tape. Transported patient via wheelchair with belongings to [facility] at 1115. Will continue to follow.     Electronically signed by Nneka Bob RN on 2019 at 11:35 AM
no

## 2022-09-12 ENCOUNTER — OFFICE VISIT (OUTPATIENT)
Dept: SURGERY | Age: 77
End: 2022-09-12
Payer: MEDICARE

## 2022-09-12 VITALS
BODY MASS INDEX: 22.26 KG/M2 | TEMPERATURE: 98.7 F | HEIGHT: 62 IN | OXYGEN SATURATION: 91 % | HEART RATE: 75 BPM | SYSTOLIC BLOOD PRESSURE: 112 MMHG | WEIGHT: 121 LBS | DIASTOLIC BLOOD PRESSURE: 64 MMHG

## 2022-09-12 DIAGNOSIS — C50.411 MALIGNANT NEOPLASM OF UPPER-OUTER QUADRANT OF RIGHT BREAST IN FEMALE, ESTROGEN RECEPTOR NEGATIVE (HCC): Primary | ICD-10-CM

## 2022-09-12 DIAGNOSIS — Z17.1 MALIGNANT NEOPLASM OF UPPER-OUTER QUADRANT OF RIGHT BREAST IN FEMALE, ESTROGEN RECEPTOR NEGATIVE (HCC): Primary | ICD-10-CM

## 2022-09-12 PROCEDURE — 99213 OFFICE O/P EST LOW 20 MIN: CPT | Performed by: SURGERY

## 2022-09-12 PROCEDURE — 1123F ACP DISCUSS/DSCN MKR DOCD: CPT | Performed by: SURGERY

## 2022-09-12 RX ORDER — AZITHROMYCIN 250 MG/1
250 TABLET, FILM COATED ORAL DAILY
COMMUNITY

## 2022-09-12 RX ORDER — POTASSIUM CHLORIDE 1.5 G/1.77G
20 POWDER, FOR SOLUTION ORAL 2 TIMES DAILY
COMMUNITY

## 2022-09-12 ASSESSMENT — ENCOUNTER SYMPTOMS
SORE THROAT: 0
COLOR CHANGE: 0
CHEST TIGHTNESS: 0
VOICE CHANGE: 0
SHORTNESS OF BREATH: 1
TROUBLE SWALLOWING: 0
ABDOMINAL PAIN: 0
NAUSEA: 0
VOMITING: 0
BLOOD IN STOOL: 0
EYE REDNESS: 0

## 2022-09-12 NOTE — PROGRESS NOTES
Leticia Moran MD   General Surgery  Follow up Patient Evaluation in Office  Pt Name: Tomasa Latham  Date of Birth 1945   Today's Date: 9/12/2022  Medical Record Number: [id number]  Primary Care Provider: Irma Kingston. Catehrine Varma MD  Chief Complaint:  Chief Complaint   Patient presents with    1 Year Follow Up     Invasive ductal carcinoma of right breast--mammogram scheduled 10/24/22           ASSESSMENT      1. Malignant neoplasm of upper-outer quadrant of right breast in female, estrogen receptor negative (HCC)        Pathologic stage IB   Triple negative, no evidence of recurrent disease  2. COPD  PLANS     1. Clinical breast examination performed today is benign  2. Breast imaging, 10/21/2021 no suspicious findings. Continue yearly mammography. Imaging reviewed. Patient has future mammogram order. 3.  Encourage monthly self breast examinations. Call for any changes. 4.  Follow-up surgical clinic 1 year. Call in interim with any questions or concerns. 5.  Breast cancer survivorship plan discussed with patient. Patient continues to follow with Dr. Nohemi Conklin oncology. 6.  Survivorship plan reviewed. SUBJECTIVE   History of present illness:  Vinny Wilson is a 77y.o. year old female who is presenting today in the office for follow-up evaluation of carcinoma of the right breast.    In February 2018 she underwent a right partial mastectomy and right axillary sentinel node biopsy. For invasive ductal carcinoma of the upper outer quadrant ER/MS negative,  HER-2/joelle negative. Surgical pathology revealed a T1b invasive ductal adenocarcinoma. Margins were negative. Closest margin was deep margin 4 mm. She had 2 sentinel lymph nodes removed negative for malignancy. She completed a course of external beam radiation therapy treatments. She declined adjuvant chemotherapy which was offered. She follows with Dr. Radha Harris from medical oncology.   Earlier this year she had diagnostic mammography performed which showed new microcalcifications in the right breast with moderate suspicion for malignancy. They were just posterior to her lumpectomy site. She was not felt to be a candidate for stereotactic biopsy and underwent a surgical needle localization biopsy. Pathology revealed fibroadenoma and benign calcifications. Interval history:    Andre Root continues to follow with Dr. Leonardo Valderrama oncology. She has an order for mammogram upcoming. She denies any breast symptoms. She is on oxygen at night and as needed. She looks well. Clinical breast examination is benign. She denies any new cancer diagnoses in her family. She denies any breast pain or nipple discharge. She looks better than she has in a long time. She states she is feeling well. She has no signs or symptoms of lymphedema of the breast or right upper extremity. Mother and sister history of postmenopausal breast cancer. Patient declined genetic testing.     Past Medical History  Past Medical History:   Diagnosis Date    Abnormal weight gain     Adjustment disorder     Anemia     Anxiety     Breast cancer ([facility] 2018    Cancer ([facility] [address] 2018    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    COPD (chronic obstructive pulmonary disease) (HCC)     GERD (gastroesophageal reflux disease)     Heart palpitations     History of therapeutic radiation 2018    Hypertension     Hypokalemia     Tinea unguium        Past Surgical History  Past Surgical History:   Procedure Laterality Date    BREAST SURGERY Right 4/9/2019    RIGHT BREAST EXCISIONAL BX, PREOP NEEDLE LOC performed by Tierra Blanchard MD at [place] [address]      umbilical -as a child    LUNG BIOPSY  2016 [place]-    OTHER SURGICAL HISTORY      cubital tunnel release left elbow-    OTHER SURGICAL HISTORY  2000    Nu removal-    OTHER SURGICAL HISTORY  05/09/2018    excision back/lipoma    VA MASTECTOMY, PARTIAL Right 3/15/2018    RIGHT BREAST LUMPECTOMY, SENTINAL LYMPH NODE BIOPSY performed by Anjum Ho MD at [address]  [place] OFFICE/OUTPT [facility] N/A 5/9/2018    EXCISION BACK LIPOMA [facility] 2019     BREAST NEEDLE BIOPSY RIGHT Right 2019       Medications  Current Outpatient Medications   Medication Sig Dispense Refill    azithromycin (ZITHROMAX) 250 MG tablet Take 250 mg by mouth daily      potassium chloride (KLOR-CON) 20 MEQ packet Take 20 mEq by mouth 2 times daily      ELIQUIS 5 MG TABS tablet Take 5 mg by mouth 2 times daily      Zinc Acetate, Oral, (ZINC ACETATE PO) Take by mouth      NONFORMULARY Neuroflow plus      Biotin 2500 MCG CAPS Take by mouth      Hydroxyurea (HYDREA PO) Take by mouth daily      Atorvastatin Calcium (LIPITOR PO) Take by mouth daily      guaiFENesin (MUCINEX) 600 MG extended release tablet Take 1,200 mg by mouth 2 times daily      nystatin (MYCOSTATIN) 449983 UNIT/ML suspension Take 500,000 Units by mouth 4 times daily      Multiple Vitamins-Minerals (CENTRUM SILVER PO) Take 1 tablet by mouth daily      vitamin D (CHOLECALCIFEROL) 1000 UNIT TABS tablet Take 1,000 Units by mouth daily      budesonide (PULMICORT) 0.5 MG/2ML nebulizer suspension Take 1 ampule by nebulization 2 times daily      verapamil (VERELAN) 240 MG extended release capsule Take 240 mg by mouth nightly      montelukast (SINGULAIR) 10 MG tablet Take 10 mg by mouth nightly      ALPRAZolam (XANAX) 0.5 MG tablet Take 0.5 mg by mouth 3 times daily as needed for Sleep.      benzonatate (TESSALON) 100 MG capsule Take 100 mg by mouth 3 times daily as needed for Cough      albuterol sulfate  (90 Base) MCG/ACT inhaler Inhale 2 puffs into the lungs every 4 hours as needed for Wheezing      sennosides-docusate sodium (SENOKOT-S) 8.6-50 MG tablet Take 1 tablet by mouth daily as needed for Constipation      NONFORMULARY Eye drops as needed FOR DRY EYE      fluticasone (FLONASE) 50 MCG/ACT nasal spray 1 spray by Nasal route daily as needed for Rhinitis      gabapentin (NEURONTIN) 100 MG capsule Take 100 mg by mouth 3 times daily. Ascorbic Acid (VITAMIN C) 250 MG tablet Take 250 mg by mouth daily      rivaroxaban (XARELTO) 2.5 MG TABS tablet Take 2.5 mg by mouth 2 times daily (Patient not taking: No sig reported)      NONFORMULARY performersist inhaler as needed for wheezing,shortness of breath (Patient not taking: Reported on 6/10/2021)      ALLOPURINOL PO Take by mouth daily (Patient not taking: Reported on 9/12/2022)      aspirin 325 MG tablet Take 325 mg by mouth daily Stopped on 2/18/19 (Patient not taking: Reported on 6/10/2021)      CINNAMON PO Take 1 capsule by mouth 3 times daily (Patient not taking: Reported on 9/12/2022)      ferrous gluconate (FERGON) 324 (38 Fe) MG tablet Take 324 mg by mouth daily (with breakfast) (Patient not taking: Reported on 9/12/2022)      Tiotropium Bromide Monohydrate ([address] [employer]) Inhale into the lungs daily (Patient not taking: No sig reported)      hydrochlorothiazide (MICROZIDE) 12.5 MG capsule Take 12.5 mg by mouth every morning (Patient not taking: Reported on 9/12/2022)       No current facility-administered medications for this visit.      Allergies  Allergies   Allergen Reactions    Brethine [Terbutaline] Itching    Norco [Hydrocodone-Acetaminophen] Itching    Keflex [Cephalexin] Hives       Family History  Family History   Problem Relation Age of Onset    Cancer Mother         lung    Breast Cancer Mother 28        age 28-36    Other Father         leukemia    Other Sister         breast fibrosis    Breast Cancer Sister 39    Liver Disease Sister     Other Brother         suicide    Diabetes Maternal Grandmother        Social History  Social History     Socioeconomic History    Marital status:      Spouse name: Not on file    Number of children: 2    Years of education: Not on file    Highest education level: Not on file   Occupational History    Occupation: retired   Tobacco Use    Smoking status: Former     Packs/day: 2.00     Types: Cigarettes     Quit date: 2021     Years since quittin.1    Smokeless tobacco: Never   Vaping Use    Vaping Use: Former   Substance and Sexual Activity    Alcohol use: No    Drug use: Yes     Types: Marijuana Samantha Gouty)    Sexual activity: Not on file   Other Topics Concern    Not on file   Social History Narrative    Not on file     Social Determinants of Health     Financial Resource Strain: Not on file   Food Insecurity: Not on file   Transportation Needs: Not on file   Physical Activity: Not on file   Stress: Not on file   Social Connections: Not on file   Intimate Partner Violence: Not on file   Housing Stability: Not on file           Review of Systems  Review of Systems   Constitutional:  Negative for activity change, chills, fatigue and unexpected weight change. HENT:  Positive for hearing loss and tinnitus. Negative for sore throat, trouble swallowing and voice change. Eyes:  Negative for redness and visual disturbance. Respiratory:  Positive for shortness of breath. Negative for cough, chest tightness and wheezing. Cardiovascular:  Negative for chest pain and palpitations. Gastrointestinal:  Negative for abdominal pain, blood in stool, constipation, nausea and vomiting. Endocrine: Negative for cold intolerance, heat intolerance and polydipsia. Genitourinary:  Negative for dysuria, flank pain and hematuria. Musculoskeletal:  Negative for gait problem, joint swelling, myalgias, neck pain and neck stiffness. Skin:  Negative for color change and rash. Allergic/Immunologic: Negative for immunocompromised state. Neurological:  Positive for numbness. Negative for dizziness, tremors, seizures, speech difficulty and light-headedness. Hematological:  Negative for adenopathy. Bruises/bleeds easily. Psychiatric/Behavioral:  Negative for behavioral problems and confusion. The patient is not nervous/anxious. OBJECTIVE     /64 (Site: Left Upper Arm, Position: Sitting, Cuff Size: Medium Adult)   Pulse 75   Temp 98.7 °F (37.1 °C)   Ht 5' 2\" (1.575 m)   Wt 121 lb (54.9 kg)   SpO2 91%   BMI 22.13 kg/m²     Physical Exam  Vitals reviewed. Constitutional:       General: She is not in acute distress. Appearance: Normal appearance. She is well-developed. She is not diaphoretic. Comments: thin   HENT:      Head: Normocephalic and atraumatic. Eyes:      General: No scleral icterus. Extraocular Movements: Extraocular movements intact. Pupils: Pupils are equal, round, and reactive to light. Neck:      Vascular: No JVD. Trachea: No tracheal deviation. Cardiovascular:      Rate and Rhythm: Normal rate and regular rhythm. Heart sounds: Normal heart sounds. Pulmonary:      Effort: Pulmonary effort is normal. No respiratory distress. Breath sounds: Normal breath sounds. No wheezing. Chest:      Chest wall: No tenderness. Breasts:     Right: No inverted nipple, mass, nipple discharge, skin change, tenderness, axillary adenopathy or supraclavicular adenopathy. Left: No inverted nipple, mass, nipple discharge, skin change, tenderness, axillary adenopathy or supraclavicular adenopathy. Abdominal:      General: There is no distension. Palpations: Abdomen is soft. There is no mass. Tenderness: There is no abdominal tenderness. Musculoskeletal:         General: No deformity. Normal range of motion. Cervical back: Neck supple. Lymphadenopathy:      Cervical: No cervical adenopathy. Right cervical: No superficial, deep or posterior cervical adenopathy. Left cervical: No superficial, deep or posterior cervical adenopathy. Upper Body:      Right upper body: No supraclavicular, axillary or pectoral adenopathy. Left upper body: No supraclavicular, axillary or pectoral adenopathy. Skin:     General: Skin is warm and dry.       Coloration: Skin is not jaundiced. Findings: No bruising or rash. Neurological:      Mental Status: She is alert and oriented to person, place, and time. Cranial Nerves: No cranial nerve deficit. Psychiatric:         Behavior: Behavior normal.         Thought Content: Thought content normal.       Lab Results   Component Value Date    WBC 6.1 2018    HGB 16.1 (H) 2018    HCT 48.5 (H) 2018     (H) 2018    ALT 14 10/04/2019     10/04/2019    K 4.1 10/04/2019     10/04/2019    CREATININE 0.71 10/04/2019    BUN 13 10/04/2019    CO2 34 (H) 10/04/2019       3/19/2018  2:51 PM - Anthony Renae Incoming Lab Results From Soft     Narrative     [address] [place] [address]                 [phone]  [address], [address]                                                      PROC: 03/15/2018  [facility]                                    RECV: 03/15/2018  [address]                                    RPTD: 2018  [address], [address]                      MRN:  [id number]    LOC: OR                      ACCT: [de-identified]  SEX: F                      : 1945  AGE: 71 Y                         PATHOLOGY REPORT                      ATTN: Shira PÉREZ        Clinical Information: RIGHT BREAST CANCER    FINAL DIAGNOSIS:  A. Right breast lesion, lumpectomy with needle localization:   Invasive ductal adenocarcinoma, Sam score III, pT1b. Closest margin = 4mm to deep margin. Fibroadenomas with calcification. Previous biopsy site changes. B. [place] lymph nodes (2), right, resection:   Negative for malignancy (0/2). pN0 (sn). Specimen:  A) EXCISION OF BREAST, RIGHT BREAST  B) SENTINEL LYMPH NODE(S), RIGHT AXILLA      Gross Examination:  A - The container is labeled Giacomo Lalaer, right breast tissue. Received in formalin is a lumpectomy specimen. There is no  localization wire. The specimen measures 4 cm from the medial to  lateral margin, 3 cm from the cranial to caudal margins and 2.2 cm from  the skin to deep margin. On the skin margin is a skin ellipse  measuring 2.1 x 1.3 cm. The skin surface is tan and intact. There  are no lesions. The skin margin is inked blue. The deep margin is  inked yellow. The caudal and lateral margins are inked black and the  cranial and medial margins are inked green. Sections through the  specimen reveal a white fibrous cut surface. There is a well  circumscribed nodule measuring grossly 0.7 x 0.7 x 1.5 cm. A biopsy  site is identified. The identified nodule is about 0.5 cm from the  skin margin which is the nearest margin. Representative sections are  submitted. Cassette 1 - lateral margin; cassette 2 - skin, deep and  caudal margin; cassette 3 - skin, deep and cranial margin; cassette 4 -  skin, deep and caudal margin with nodule; cassette 5 - skin, deep and  cranial margin; cassette 6 - skin, deep and caudal margin; cassette 7-  skin, deep and cranial margin; cassette 8 - medial margin.   ss.    Fixative:  10% neutral buffered formalin  Tissue removal time: 1322  Tissue fixation time: 1326  Total fixation time:  30 hours and 34 minutes    B - The container is labeled Ramesh Barrett, right axilla sentinel  nodes. Received in formalin are two lymph nodes measuring 1.2 and 1.7  cm. The smaller node is bisected and entirely submitted in cassette  #1. A larger node is bisected and entirely submitted in cassette #2.  ns.   SHERICE/WALTER:lgl    Fixative:  10% neutral buffered formalin  Tissue removal time: 1256  Tissue fixation time: 1317  Total fixation time: 30 hours and 43 minutes    Microscopic Examination:  A.  Procedure   Excision (less than total mastectomy)    Specimen Laterality  Right    Tumor Size  reatest dimension of largest invasive focus >1 mm (specify exact  measurement) (millimeters): 8 mm    Histologic Type  Invasive carcinoma of no special type (ductal, not otherwise  specified),  The tumor is also slightly circumscribed and papillary and may arise in  a papillary carcinoma. Histologoc Grade (Seminary Histologic Score)  Glandular (Acinar)/Tubular Differentiation   Score 2 (10% to 75% of tumor area forming glandular/tubular  structures)    Nuclear Pleomorphism   Score 3 (vesicular nuclei, often with prominent nucleoli, exhibiting     marked variation in size and shape, occasionally with very large and     bizarre forms)  Mitotic Rate   Score 3 (>=8 mitoses per mm2) (see Table 1)  + Number of mitoses per 10 high-power fields: 10  + Diameter of microscope field (millimeters):  mm    Overall Grade   Grade 3 (scores of 8 or 9)    + Tumor Focality  Single focus of invasive carcinoma    Ductal Carcinoma In Situ (DCIS)   No DCIS in specimen    + Lobular Carcinoma In Situ (LCIS)  No LCIS in specimen    Margins  Invasive Carcinoma Margins (required only if residual invasive  carcinoma is present in specimen)   Uninvolved by invasive carcinoma   Distance from closest margin (millimeters): 4 mm  to deep margin. Regional Lymph Nodes  Lymph Node Examination (required only if lymph nodes are present in the  specimen)  Number of Lymph Nodes with Macrometastases (>2 mm): 0  Number of Lymph Nodes with Micrometastases (>0.2 mm to 2 mm and/or >200  cells):0  Number of Lymph Nodes with Isolated Tumor Cells (<=0.2 mm and <=200  cells)#: 0    Number of Lymph Nodes Examined: 2  Number of [place] Nodes Examined (required only if sentinel nodes are  present): 2    Treatment Effect : No known presurgical therapy    + Lymphovascular Invasion: Not identified    Primary Tumor (Invasive Carcinoma) (pT)   pT1:  Tumor <=20 mm in greatest dimension       pT1b: Tumor >5 mm but <=10 mm in greatest dimension    Regional Lymph Nodes (pN)  Modifier  (sn): [place] node(s) evaluated.  If 6 or more nodes (sentinel or nonsentinel) are removed, this odifier should not be used. Category (pN)   pN0: No regional lymph node metastasis identified or ITCs only#    Ancillary Studies -[id number], date of service 2/21/2018  Estrogen Receptor: (Clone SP1), Arrayit       (x) Negative ( < 1% of cells staining)    Progesterone Receptor: (Clone 1E2), PulmOne       (x) Negative ( < 1% of cells staining)    Ki-67 (clone 30-9)       Percentage of positive nuclei:  50%               Unfavorable  > 20%    Inform HER-2 Dual JIGNA (Reva )  (x)  Nonamplified - HER2/CEP17 ratio  < 2.0 AND average HER2 copy  number  < 4.0 signals/cell    Number of observers:  1  Number of invasive tumor cells counted:  20  Using dual probe assay:   Average number of HER2 signals per cell:  1.8   Average number of CEP17 signals per cell:  1.9   HER2/CEP17 ratio:  0.9    Her-2/joelle (Immunohistochemistry - Clone 4B5, PulmOne)       (x) Not performed    External Controls:  (x)  Adequate     Internal Controls:  (x)  Adequate     Standard Assay Conditions:  (x)  Met  Staining Method Used:   Formalin fixation   Antigen retrieval type:  Cell Conditioning 1, mild hesham   Time in antigen retrieval:  30 minutes   Detection system type:   DAB Ultraview kit    Comment: This patient has had multiple previous biopsies. The current  specimen demonstrates areas of fibroadenomatous change with  calcification and previous biopsy site. A p63 immunostain* is  performed on block A1 and demonstrates intact staining consistent with  areas of adenosis. The controls are excellent. *This test was developed and its performance characteristics determined  by [address] [place] [address]. It has not been cleared or  approved by the U.S. Food and Drug Administration. Pursuant to the  requirements of CLIA, this laboratory has established and verified the  test's accuracy and precision.   Additional information about this type  of test is available upon request.    B.  Multiple levels of the two sentinel lymph nodes are evaluated. There is no evidence of malignancy. [id number]  [id number]                                                      <Sign Out Dr. Stefanie Valle M.D., F.C.A.P. [facility]/ [address] [place] [address]  Printed on:  3/19/2018  Sarah [address]  [address], [address]  Original print date: 2018     Performed by: [facility]      [place], [place]                 [phone]  [address], [address]                                                      PROC: 2019  [facility]                                    RECV: 04/10/2019  [address]                                    RPTD: 04/15/2019  [address], [address]                      MRN:  [id number]    LOC: OR                      ACCT: [de-identified]  SEX: F                      : 1945  AGE: 68 Y                         PATHOLOGY REPORT                      ATTN: Ree Madden OLDAVID Evans OLDAVID        Clinical Information: RIGHT BREAST CALCIFICATIONS    FINAL DIAGNOSIS:  Breast, right, \"palps mass surrounding wire\", lumpectomy:      Hyalinized fibroadenoma. Fibrocystic changes including dense fibrosis, sclerosing adenosis,      and usual ductal hyperplasia with microcalcifications. Negative for malignancy. Specimen:  EXCISION OF BREAST, RIGHT,MASS, PALPS, SURROUNDING WIRE      Gross Examination:  The container is labeled Marylen Cipro. Received in formalin is an  oriented lumpectomy specimen with a needle localization wire. There  are only lateral, caudal, and deep markers present. There is an area  of interest inked blue on the caudal margin. The remainder of the  caudal margin is inked black. The lateral and medial margins are inked  orange. The cranial margin is inked green. The deep and skin margins  are inked yellow. Specimen is totally sectioned to reveal a  ill-defined white area of fibrosis measuring 2.5 x 0.9 x 0.8 cm. The  area of fibrosis abuts the cranial and caudal margins. Representative  sections are submitted as follows:  A1 - caudal and cranial margins; A2  - perpendicular skin margin; A3 - perpendicular deep margin; A4 -  perpendicular cranial and caudal margins; A5 - perpendicular lateral  margin; and A6 - perpendicular medial margin. SIO:[id number]    Fixative:  10% neutral buffered formalin  Tissue removal time: 1515  Tissue fixation time: 1530  Total fixation time:  28 hours and 30 minutes    Microscopic Examination:  Sections demonstrate breast tissue with fibrocystic changes including  sclerosing adenosis, dense fibrosis, and usual ductal hyperplasia. There are associated microcalcifications. There are several areas of  well-circumscribed epithelial proliferation. Immunohistochemistry for  p40 is performed on blocks A1, A4, and A5 with appropriate controls. The areas of interest demonstrate intact p40 staining consistent with  sclerosing adenosis. There is a hyalinized fibroadenoma present and  changes consistent with the patient's history of previous lumpectomy. In situ and invasive carcinoma is not identified. Clinical and  radiologic correlation is recommended. *This test was developed and its performance characteristics determined  by [facility]. It has not been cleared or  approved by the U.S. Food and Drug Administration. Pursuant to the  requirements of CLIA, this laboratory has established and verified the  test's accuracy and precision. Additional information about this type  of test is available upon request.    [id number]  [id number]                                                        <Sign Out Dr. Katherine Bartlett M.D., F.C.A.P.       [facility]/ [facility] Printed on:  4/15/2019  [facility], [facility], [place]  Original print date: 04/15/2019        LOCATION: [place]       PROCEDURE: MOUNA HOUSTON DIGITAL SCREEN SELF REFERRAL W OR WO CAD BILATERAL       CLINICAL INFORMATION: Visit for screening mammogram. Tomosynthesis. CLINICAL:     Self-referred screening mammogram   PATIENT MEDICAL HISTORY:  Medical history includes breast cancer. FAMILY HISTORY:   Family medical history includes breast cancer in 2 relatives (mother, sister). RISK VALUES: Longer-[place] 10yr.: na%, [place]-A[place] life:   na%       COMPARISON: 10/28/2020, 2/11/2019 and 2/21/2018. TECHNIQUE: Bilateral CC and MLO views of the breasts were obtained. 3D tomosynthesis was utilized. CAD was utilized. BREAST COMPOSITION: There are scattered fibroglandular elements in the breast(s) that could obscure a lesion on mammography. FINDINGS:  There are bilateral scattered benign stable calcifications. There are postoperative changes in the right. There is a benign-appearing right lymph node. There are vascular calcifications. No significant masses, calcifications, or other findings    are seen in the breast(s). There has been no significant interval change. Impression   No mammographic evidence of malignancy. A 1 year screening mammogram is recommended. A result letter will be sent to the patient. She will also receive a reminder 1 month prior to her next mammogram.       . BI-RADS CATEGORY 2: BENIGN FINDINGS. Management Recommendation: Routine annual mammography. **This report has been created using voice recognition software. It may contain minor errors which are inherent in voice recognition technology. **       Final report electronically signed by Dr. Mark Maldonado on 10/22/2021 11:44 AM           Imaging reviewed. Dr. Fairbanks Look notes reviewed.

## 2022-09-13 ASSESSMENT — ENCOUNTER SYMPTOMS
CONSTIPATION: 0
WHEEZING: 0
COUGH: 0

## 2022-11-29 ENCOUNTER — TRANSCRIBE ORDERS (OUTPATIENT)
Dept: ADMINISTRATIVE | Age: 77
End: 2022-11-29

## 2022-11-29 ENCOUNTER — TELEPHONE (OUTPATIENT)
Dept: CARDIOTHORACIC SURGERY | Age: 77
End: 2022-11-29

## 2022-11-29 DIAGNOSIS — I70.213 ATHEROSCLEROSIS OF NATIVE ARTERY OF BOTH LOWER EXTREMITIES WITH INTERMITTENT CLAUDICATION (HCC): Primary | ICD-10-CM

## 2022-11-29 NOTE — TELEPHONE ENCOUNTER
Received a referral from Dr. Court Sarabia for this patient. I scheduled the patient for 12/19/2022 at 9:00 am with Dr. Omayra Stover.     I tried to reach patient, no answer, I left her a message to call our office back, if appointment date,and time does not work for patient, it is ok to r/s her.

## 2022-12-02 ENCOUNTER — HOSPITAL ENCOUNTER (OUTPATIENT)
Dept: INTERVENTIONAL RADIOLOGY/VASCULAR | Age: 77
Discharge: HOME | End: 2022-12-02
Payer: MEDICARE

## 2022-12-02 DIAGNOSIS — I70.213 ATHEROSCLEROSIS OF NATIVE ARTERY OF BOTH LOWER EXTREMITIES WITH INTERMITTENT CLAUDICATION (HCC): ICD-10-CM

## 2022-12-02 PROCEDURE — 93923 UPR/LXTR ART STDY 3+ LVLS: CPT

## 2023-01-16 ENCOUNTER — OFFICE VISIT (OUTPATIENT)
Dept: CARDIOTHORACIC SURGERY | Age: 78
End: 2023-01-16
Payer: MEDICARE

## 2023-01-16 VITALS
HEIGHT: 62 IN | WEIGHT: 125.2 LBS | SYSTOLIC BLOOD PRESSURE: 130 MMHG | BODY MASS INDEX: 23.04 KG/M2 | HEART RATE: 63 BPM | DIASTOLIC BLOOD PRESSURE: 67 MMHG

## 2023-01-16 DIAGNOSIS — I70.213 ATHEROSCLEROSIS OF NATIVE ARTERY OF BOTH LOWER EXTREMITIES WITH INTERMITTENT CLAUDICATION (HCC): ICD-10-CM

## 2023-01-16 DIAGNOSIS — I70.245 ATHSCL NATIVE ARTERIES OF LEFT LEG W ULCERATION OTH PRT FOOT (HCC): Primary | ICD-10-CM

## 2023-01-16 DIAGNOSIS — R60.0 LOCALIZED EDEMA: ICD-10-CM

## 2023-01-16 DIAGNOSIS — I25.10 CORONARY ARTERY DISEASE INVOLVING NATIVE CORONARY ARTERY OF NATIVE HEART WITHOUT ANGINA PECTORIS: ICD-10-CM

## 2023-01-16 DIAGNOSIS — I73.9 PVD (PERIPHERAL VASCULAR DISEASE) WITH CLAUDICATION (HCC): ICD-10-CM

## 2023-01-16 PROCEDURE — 1036F TOBACCO NON-USER: CPT | Performed by: THORACIC SURGERY (CARDIOTHORACIC VASCULAR SURGERY)

## 2023-01-16 PROCEDURE — G8400 PT W/DXA NO RESULTS DOC: HCPCS | Performed by: THORACIC SURGERY (CARDIOTHORACIC VASCULAR SURGERY)

## 2023-01-16 PROCEDURE — G8420 CALC BMI NORM PARAMETERS: HCPCS | Performed by: THORACIC SURGERY (CARDIOTHORACIC VASCULAR SURGERY)

## 2023-01-16 PROCEDURE — G8427 DOCREV CUR MEDS BY ELIG CLIN: HCPCS | Performed by: THORACIC SURGERY (CARDIOTHORACIC VASCULAR SURGERY)

## 2023-01-16 PROCEDURE — G8484 FLU IMMUNIZE NO ADMIN: HCPCS | Performed by: THORACIC SURGERY (CARDIOTHORACIC VASCULAR SURGERY)

## 2023-01-16 PROCEDURE — 1090F PRES/ABSN URINE INCON ASSESS: CPT | Performed by: THORACIC SURGERY (CARDIOTHORACIC VASCULAR SURGERY)

## 2023-01-16 PROCEDURE — 1123F ACP DISCUSS/DSCN MKR DOCD: CPT | Performed by: THORACIC SURGERY (CARDIOTHORACIC VASCULAR SURGERY)

## 2023-01-16 PROCEDURE — 99204 OFFICE O/P NEW MOD 45 MIN: CPT | Performed by: THORACIC SURGERY (CARDIOTHORACIC VASCULAR SURGERY)

## 2023-01-16 ASSESSMENT — ENCOUNTER SYMPTOMS
SHORTNESS OF BREATH: 1
GASTROINTESTINAL NEGATIVE: 1
COLOR CHANGE: 1
WHEEZING: 1
ALLERGIC/IMMUNOLOGIC NEGATIVE: 1

## 2023-01-16 NOTE — PROGRESS NOTES
[address] [facility] SURGERY  [address] [place] [address] [facility]  Dept: [phone]  Dept Fax: [phone]: [phone]    Visit Date: 1/16/2023    Ms. Richie Cifuentes is a 69 y. o.female  who presented for:  Chief Complaint   Patient presents with    New Patient     NP referral from Dr. Lynne Cristina, intermittent claudication, bilat legs/feet       HPI:   HPI     69year old female with PMHx of Right Breast CA, s/p XRT, COPD on O2 at night, HTN, anxiety disorder, past heavy tobacco use, GERD, and atrial fibrillation on Eliquis presents for evaluation of left foot ulcer and PAD. She states that several months ago, she bought new shoes, and following that, she developed bilateral heel blisters, with the left heel proceeding to an open ulcer. It failed to heal, despite local wound care with Neosporin. No cellulitis, drainage, or antibiotics. She was seen by her PCP, and then referred to Dr. Lynne Cristina, DPM.  He felt that she had poor distal pulses, and PAD and thus ordered an ROBERT exam at [facility]:    Arterial duplex:    1. Moderately diminished ABIs right side. Markedly diminished ABIs left side. 2. No definite stenosis is seen in the right leg. Cannot exclude trifurcation disease. 3. Findings of moderate to high-grade stenosis in the proximal left SFA. 4. Aortofemoral angiography is recommended for further evaluation with possible intervention. If desired, this can be scheduled through the Interventional scheduling desk of [address] [place] [address] Radiology Department ([phone]). The wound care was switched to betadine, but it was too painful, so she's back on Neosporin. She states that she has had cramping in her feet and bilateral calf areas L>R for several years along with rest pain. No nocturnal pain noted. She reports no previous ulcers prior to this year, and also admits numbness, tingling and coolness.  She also has bilateral R=L edema in calf with improvement with elevation. No VV's or DVT.        Current Outpatient Medications:     Neomycin-Bacitracin-Polymyxin (HCA TRIPLE ANTIBIOTIC OINTMENT EX), Apply topically as needed, Disp: , Rfl:     azithromycin (ZITHROMAX) 250 MG tablet, Take 250 mg by mouth daily, Disp: , Rfl:     potassium chloride (KLOR-CON) 20 MEQ packet, Take 20 mEq by mouth 2 times daily, Disp: , Rfl:     ELIQUIS 5 MG TABS tablet, Take 5 mg by mouth 2 times daily, Disp: , Rfl:     Zinc Acetate, Oral, (ZINC ACETATE PO), Take by mouth, Disp: , Rfl:     Ascorbic Acid (VITAMIN C) 250 MG tablet, Take 250 mg by mouth daily, Disp: , Rfl:     NONFORMULARY, Neuroflow plus, Disp: , Rfl:     Biotin 2500 MCG CAPS, Take by mouth, Disp: , Rfl:     Hydroxyurea (HYDREA PO), Take by mouth daily, Disp: , Rfl:     Atorvastatin Calcium (LIPITOR PO), Take by mouth daily, Disp: , Rfl:     guaiFENesin (MUCINEX) 600 MG extended release tablet, Take 1,200 mg by mouth 2 times daily, Disp: , Rfl:     nystatin (MYCOSTATIN) 768906 UNIT/ML suspension, Take 500,000 Units by mouth 4 times daily, Disp: , Rfl:     Multiple Vitamins-Minerals (CENTRUM SILVER PO), Take 1 tablet by mouth daily, Disp: , Rfl:     vitamin D (CHOLECALCIFEROL) 1000 UNIT TABS tablet, Take 1,000 Units by mouth daily, Disp: , Rfl:     budesonide (PULMICORT) 0.5 MG/2ML nebulizer suspension, Take 1 ampule by nebulization 2 times daily, Disp: , Rfl:     verapamil (VERELAN) 240 MG extended release capsule, Take 240 mg by mouth nightly, Disp: , Rfl:     montelukast (SINGULAIR) 10 MG tablet, Take 10 mg by mouth nightly, Disp: , Rfl:     ALPRAZolam (XANAX) 0.5 MG tablet, Take 0.5 mg by mouth 3 times daily as needed for Sleep., Disp: , Rfl:     benzonatate (TESSALON) 100 MG capsule, Take 100 mg by mouth 3 times daily as needed for Cough, Disp: , Rfl:     albuterol sulfate  (90 Base) MCG/ACT inhaler, Inhale 2 puffs into the lungs every 4 hours as needed for Wheezing, Disp: , Rfl: sennosides-docusate sodium (SENOKOT-S) 8.6-50 MG tablet, Take 1 tablet by mouth daily as needed for Constipation, Disp: , Rfl:     NONFORMULARY, Eye drops as needed FOR DRY EYE, Disp: , Rfl:     fluticasone (FLONASE) 50 MCG/ACT nasal spray, 1 spray by Nasal route daily as needed for Rhinitis, Disp: , Rfl:     gabapentin (NEURONTIN) 100 MG capsule, Take 100 mg by mouth 3 times daily. , Disp: , Rfl:     Allergies   Allergen Reactions    Brethine [Terbutaline] Itching    Norco [Hydrocodone-Acetaminophen] Itching    Keflex [Cephalexin] Hives       Past Medical History  Lynne Leyva  has a past medical history of Abnormal weight gain, Adjustment disorder, Anemia, Anxiety, Breast cancer ([facility] Cancer ([facility] [address] COPD (chronic obstructive pulmonary disease) ([facility] [address] GERD (gastroesophageal reflux disease), Heart palpitations, History of therapeutic radiation, Hypertension, Hypokalemia, and Tinea unguium. Social History  Lynne Leyva  reports that she quit smoking about 17 months ago. Her smoking use included cigarettes. She smoked an average of 2 packs per day. She has never used smokeless tobacco. She reports current drug use. Drug: Marijuana Darliss Meeter). She reports that she does not drink alcohol. Family History  [facility] includes Breast Cancer (age of onset: 28) in her mother; Breast Cancer (age of onset: 39) in her sister; Cancer in her mother; Diabetes in her maternal grandmother; Liver Disease in her sister; Other in her brother, father, and sister. There is no family history of bicuspid aortic valve, aneurysms, heart transplant, pacemakers, defibrillators, or sudden cardiac death.       Past Surgical History   Past Surgical History:   Procedure Laterality Date    BREAST SURGERY Right 4/9/2019    RIGHT BREAST EXCISIONAL BX, PREOP NEEDLE LOC performed by Nic Narayanan MD at [address] -as a child    LUNG BIOPSY  2016    [facility]-    OTHER SURGICAL HISTORY      cubital tunnel release left elbow-    OTHER SURGICAL HISTORY  2000    Nu removal-    OTHER SURGICAL HISTORY  05/09/2018    excision back/lipoma    TX MASTECTOMY PARTIAL Right 3/15/2018    RIGHT BREAST LUMPECTOMY, SENTINAL LYMPH NODE BIOPSY performed by Kayode Calix MD at [address]  [place] OFFICE/OUTPT [facility] N/A 5/9/2018    EXCISION BACK LIPOMA [facility]ode Calix [facility] LEFT Left 2019     BREAST NEEDLE BIOPSY RIGHT Right 2019       Subjective:     Review of Systems   Constitutional:  Positive for activity change and fatigue. HENT: Negative. Respiratory:  Positive for shortness of breath and wheezing. Cardiovascular: Negative. Gastrointestinal: Negative. Genitourinary: Negative. Musculoskeletal:  Positive for arthralgias and myalgias. Skin:  Positive for color change and wound. Allergic/Immunologic: Negative. Neurological: Negative. Hematological: Negative. Psychiatric/Behavioral: Negative. Objective:     /67   Pulse 63   Ht 5' 2\" (1.575 m)   Wt 125 lb 3.2 oz (56.8 kg)   BMI 22.90 kg/m²     Wt Readings from Last 3 Encounters:   01/16/23 125 lb 3.2 oz (56.8 kg)   09/12/22 121 lb (54.9 kg)   06/10/21 113 lb 9.6 oz (51.5 kg)     BP Readings from Last 3 Encounters:   01/16/23 130/67   09/12/22 112/64   06/10/21 117/65       Physical Exam  Constitutional:       Appearance: Normal appearance. She is normal weight. HENT:      Head: Normocephalic and atraumatic. Neck:      Vascular: No carotid bruit. Cardiovascular:      Rate and Rhythm: Rhythm irregular. Comments: No palpable pulses  Right: DP/TP/digitals biphasic  No ulcers    Left: DP/TP/digitals monophasic  +eschar on left upper heal.  No redness, warmth. Musculoskeletal:      Cervical back: Normal range of motion and neck supple. Right lower leg: No edema. Left lower leg: No edema. Lymphadenopathy:      Cervical: No cervical adenopathy.    Skin: Findings: Lesion present. Comments: +eschar on left upper heal.  No redness, warmth. Neurological:      General: No focal deficit present. Mental Status: She is alert. Mental status is at baseline. Psychiatric:         Mood and Affect: Mood normal.         Behavior: Behavior normal.         Judgment: Judgment normal.           Lab Results   Component Value Date/Time    WBC 6.1 03/02/2018 12:00 PM    RBC 5.17 03/02/2018 12:00 PM    HGB 16.1 03/02/2018 12:00 PM    HCT 48.5 03/02/2018 12:00 PM    MCV 93.9 03/02/2018 12:00 PM    MCH 31.2 03/02/2018 12:00 PM    MCHC 33.2 03/02/2018 12:00 PM    RDW 15.9 03/02/2018 12:00 PM     03/02/2018 12:00 PM    MPV 7.5 03/02/2018 12:00 PM       Lab Results   Component Value Date/Time     10/04/2019 11:58 AM    K 4.1 10/04/2019 11:58 AM     10/04/2019 11:58 AM    CO2 34 10/04/2019 11:58 AM    BUN 13 10/04/2019 11:58 AM    CREATININE 0.71 10/04/2019 11:58 AM    CALCIUM 10.10 10/04/2019 11:58 AM    LABGLOM 82 03/02/2018 12:00 PM    GLUCOSE 90 10/04/2019 11:58 AM       Lab Results   Component Value Date/Time    ALT 14 10/04/2019 11:58 AM         Assessment/Plan     1. PVD (peripheral vascular disease) with claudication ([address]    2. Localized edema    3. Coronary artery disease involving native coronary artery of native heart without angina pectoris    4. Athscl native arteries of left leg w ulceration oth prt foot ([address]      Orders Placed This Encounter   Procedures    Reflux study/Reflux Venous Insufficiency Bilateral     Schedule Aortogram, left lower extremity angiogram, and possible endovascular intervention in IR suite in 1-2 weeks. Hold Eliquis for 4 days prior to procedure. Return in about 2 weeks (around 1/30/2023) for Aortogram, left leg angiogram, and possible endovascular intervention in IR suite, day surgery.       Electronically signed by Roberta Ivey MD   1/16/2023 at 11:25 AM EST

## 2023-01-16 NOTE — LETTER
[address] [facility] Cardiothoracic & Vascular Surgery  [address] [facility] [address]  Phone: [phone]  Fax: [phone]           Scotty Mena MD      January 16, 2023     Patient: Peggi Heimlich   MR Number: [id number]   YOB: 1945   Date of Visit: 1/16/2023       Dear Dr. Doris Matthews:    Thank you for referring Rose Naik to me for evaluation/treatment. Below are the relevant portions of my assessment and plan of care. If you have questions, please do not hesitate to call me. I look forward to following [facility] along with you.     Sincerely,        Scotty Mena MD    CC providers:  Yuliana Jordan DPM  [address] [facility] [address] [address]  [facility] [facility]

## 2023-01-24 ENCOUNTER — TELEPHONE (OUTPATIENT)
Dept: CARDIOTHORACIC SURGERY | Age: 78
End: 2023-01-24

## 2023-01-24 ENCOUNTER — HOSPITAL ENCOUNTER (OUTPATIENT)
Dept: AUDIOLOGY | Age: 78
Discharge: HOME | End: 2023-01-24
Payer: COMMERCIAL

## 2023-01-24 PROCEDURE — S0618 AUDIOMETRY FOR HEARING AID: HCPCS | Performed by: AUDIOLOGIST

## 2023-01-24 NOTE — TELEPHONE ENCOUNTER
Patient left voicemail, would like to reschedule venous reflux study due to inclement weather. Looks like she already rescheduled the study to 1/31/23. She is scheduled for an IR angiogram with Dr. Jefe Wells on 1/30/23. Follow up on 2/13/23. I would like move the venous reflux study to the following week (Feb 6-10) if possible. LMOM for patient to call office to discuss.

## 2023-01-24 NOTE — PROGRESS NOTES
AUDIOLOGICAL EVALUATION      REASON FOR TESTING:  The patient would like to obtain new hearing aids. She was previously fit with Nora Jeong LEANNE hearing aids at an outside facility 3 years ago. She has not been satisfied with her previous hearing aid fitting and is not utilizing the hearing aids. She reports longstanding bilateral hearing loss and tinnitus. She is experiencing difficulty hearing and understanding her daughter at home. She denies otalgia, otorrhea and previous ear surgeries. She reported a long history of occupational noise exposure from working at [employer]. She does have some imbalance while walking. She has poor circulation resulting in reduced sensation in her feet in additional to neuropathy. Other significant health issues include COPD, cardiac issues and circulatory issues. She does use oxygen. OTOSCOPY: Clear canal with normal appearing tympanic membrane in both ears. AUDIOGRAM          Reliability: Good    COMMENTS: Mild sloping to severe sensorineural hearing loss in both ears. Word recognition ability is poor at 68% for the left ear and good at 86% for the right ear with recorded speech present at a louder than average conversational speech but comfortable presentation level. NEW HEARING AID CONSULTATION:  Available hearing aid styles, technologies and options were discussed. The patient is interested in pursuing Sil Pitcher 1200 ITC-R hearing aids for both ears. Bilateral ear impressions made without incident. The patient's hearing aid fitting is scheduled for 02/09/2023        RECOMMENDATION(S):   Pursue new hearing aids as desired. Annual audiometric evaluation to monitor hearing thresholds is encouraged.

## 2023-01-26 ENCOUNTER — PREP FOR PROCEDURE (OUTPATIENT)
Dept: CARDIOTHORACIC SURGERY | Age: 78
End: 2023-01-26

## 2023-01-26 DIAGNOSIS — Z01.818 PREOP TESTING: Primary | ICD-10-CM

## 2023-01-26 DIAGNOSIS — I79.8 OTHER DISORDERS OF ARTERIES, ARTERIOLES AND CAPILLARIES IN DISEASES CLASSIFIED ELSEWHERE (HCC): ICD-10-CM

## 2023-01-26 RX ORDER — SODIUM CHLORIDE 0.9 % (FLUSH) 0.9 %
5-40 SYRINGE (ML) INJECTION PRN
Status: CANCELLED | OUTPATIENT
Start: 2023-01-26

## 2023-01-26 RX ORDER — SODIUM CHLORIDE 0.9 % (FLUSH) 0.9 %
5-40 SYRINGE (ML) INJECTION EVERY 12 HOURS SCHEDULED
Status: CANCELLED | OUTPATIENT
Start: 2023-01-26

## 2023-01-26 RX ORDER — SODIUM CHLORIDE 9 MG/ML
INJECTION, SOLUTION INTRAVENOUS PRN
Status: CANCELLED | OUTPATIENT
Start: 2023-01-26

## 2023-01-26 NOTE — TELEPHONE ENCOUNTER
Patient returned call. She was under the impression that venous reflux had to be done prior to IR angiogram, not afterwards. Dr. Ry Shafer, can you please clarify?

## 2023-01-27 NOTE — TELEPHONE ENCOUNTER
Spoke with Dr. Manolo Mackenzie, he states REFLUX STUDIES DO NOT HAVE TO BE BEFORE ANGIOGRAMS. It is ok for patient to get angiogram on Monday and have study in a few days. Spoke with patient, r/s'd reflux study for 2/7/23 at 2:00pm. Instructions discussed. She voiced understanding.

## 2023-01-30 ENCOUNTER — APPOINTMENT (OUTPATIENT)
Dept: CT IMAGING | Age: 78
End: 2023-01-30
Attending: THORACIC SURGERY (CARDIOTHORACIC VASCULAR SURGERY)
Payer: MEDICARE

## 2023-01-30 ENCOUNTER — HOSPITAL ENCOUNTER (INPATIENT)
Dept: INTERVENTIONAL RADIOLOGY/VASCULAR | Age: 78
LOS: 5 days | Discharge: HOME | End: 2023-02-04
Attending: THORACIC SURGERY (CARDIOTHORACIC VASCULAR SURGERY) | Admitting: THORACIC SURGERY (CARDIOTHORACIC VASCULAR SURGERY)
Payer: MEDICARE

## 2023-01-30 ENCOUNTER — APPOINTMENT (OUTPATIENT)
Dept: INTERVENTIONAL RADIOLOGY/VASCULAR | Age: 78
End: 2023-01-30
Attending: THORACIC SURGERY (CARDIOTHORACIC VASCULAR SURGERY)
Payer: MEDICARE

## 2023-01-30 DIAGNOSIS — R60.0 LOCALIZED EDEMA: ICD-10-CM

## 2023-01-30 DIAGNOSIS — M79.81 NONTRAUMATIC RECTUS HEMATOMA: ICD-10-CM

## 2023-01-30 DIAGNOSIS — I25.10 CORONARY ARTERY DISEASE INVOLVING NATIVE CORONARY ARTERY OF NATIVE HEART WITHOUT ANGINA PECTORIS: ICD-10-CM

## 2023-01-30 DIAGNOSIS — I70.245 ATHSCL NATIVE ARTERIES OF LEFT LEG W ULCERATION OTH PRT FOOT (HCC): ICD-10-CM

## 2023-01-30 DIAGNOSIS — I70.213 ATHEROSCLEROSIS OF NATIVE ARTERY OF BOTH LOWER EXTREMITIES WITH INTERMITTENT CLAUDICATION (HCC): ICD-10-CM

## 2023-01-30 DIAGNOSIS — E87.5 HYPERKALEMIA: Primary | ICD-10-CM

## 2023-01-30 DIAGNOSIS — I73.9 PVD (PERIPHERAL VASCULAR DISEASE) WITH CLAUDICATION (HCC): ICD-10-CM

## 2023-01-30 PROBLEM — I70.222 ATHEROSCLEROSIS OF NATIVE ARTERY OF LEFT LOWER EXTREMITY WITH REST PAIN (HCC): Status: ACTIVE | Noted: 2023-01-30

## 2023-01-30 PROBLEM — I70.202 ATHEROSCLEROSIS OF NATIVE ARTERY OF LEFT LOWER EXTREMITY (HCC): Status: ACTIVE | Noted: 2023-01-30

## 2023-01-30 LAB
ABO: NORMAL
ACTIVATED CLOTTING TIME: 191 SECONDS (ref 1–150)
ACTIVATED CLOTTING TIME: 191 SECONDS (ref 1–150)
ACTIVATED CLOTTING TIME: 215 SECONDS (ref 1–150)
ACTIVATED CLOTTING TIME: 245 SECONDS (ref 1–150)
ACTIVATED CLOTTING TIME: 251 SECONDS (ref 1–150)
ANION GAP SERPL CALC-SCNC: 9 MEQ/L (ref 8–16)
ANION GAP SERPL CALC-SCNC: 9 MEQ/L (ref 8–16)
ANTIBODY SCREEN: NORMAL
APTT PPP: 112.8 SECONDS (ref 22–38)
APTT PPP: 38.4 SECONDS (ref 22–38)
BASOPHILS ABSOLUTE: 0 THOU/MM3 (ref 0–0.1)
BASOPHILS NFR BLD AUTO: 0.1 %
BUN SERPL-MCNC: 12 MG/DL (ref 7–22)
BUN SERPL-MCNC: 18 MG/DL (ref 7–22)
CA-I BLD ISE-SCNC: 0.9 MMOL/L (ref 1.12–1.32)
CALCIUM SERPL-MCNC: 5.8 MG/DL (ref 8.5–10.5)
CALCIUM SERPL-MCNC: 9.1 MG/DL (ref 8.5–10.5)
CHLORIDE SERPL-SCNC: 105 MEQ/L (ref 98–111)
CHLORIDE SERPL-SCNC: 118 MEQ/L (ref 98–111)
CLOT ANGLE BLD TEG: 17.7 MM (ref 15–32)
CLOT INIT BLD TEG: > 17 MINUTES (ref 4.6–9.1)
CLOT LYSIS 30M P MA LENFR BLD TEG: 0 % (ref 0–2.6)
CO2 SERPL-SCNC: 18 MEQ/L (ref 23–33)
CO2 SERPL-SCNC: 30 MEQ/L (ref 23–33)
CREAT SERPL-MCNC: 0.4 MG/DL (ref 0.4–1.2)
CREAT SERPL-MCNC: 0.7 MG/DL (ref 0.4–1.2)
DEPRECATED RDW RBC AUTO: 40.5 FL (ref 35–45)
DEPRECATED RDW RBC AUTO: 45.6 FL (ref 35–45)
EOSINOPHIL NFR BLD AUTO: 0 %
EOSINOPHILS ABSOLUTE: 0 THOU/MM3 (ref 0–0.4)
ERYTHROCYTE [DISTWIDTH] IN BLOOD BY AUTOMATED COUNT: 10.8 % (ref 11.5–14.5)
ERYTHROCYTE [DISTWIDTH] IN BLOOD BY AUTOMATED COUNT: 12.1 % (ref 11.5–14.5)
FIBRINOGEN PPP-MCNC: 165 MG/100ML (ref 155–475)
GFR SERPL CREATININE-BSD FRML MDRD: > 60 ML/MIN/1.73M2
GFR SERPL CREATININE-BSD FRML MDRD: > 60 ML/MIN/1.73M2
GLUCOSE SERPL-MCNC: 103 MG/DL (ref 70–108)
GLUCOSE SERPL-MCNC: 87 MG/DL (ref 70–108)
HCT VFR BLD AUTO: 27.1 % (ref 37–47)
HCT VFR BLD AUTO: 30.1 % (ref 37–47)
HCT VFR BLD AUTO: 39.1 % (ref 37–47)
HGB BLD-MCNC: 13 GM/DL (ref 12–16)
HGB BLD-MCNC: 9 GM/DL (ref 12–16)
HGB BLD-MCNC: 9.5 GM/DL (ref 12–16)
IMM GRANULOCYTES # BLD AUTO: 0.04 THOU/MM3 (ref 0–0.07)
IMM GRANULOCYTES NFR BLD AUTO: 0.5 %
INR PPP: 1.04 (ref 0.85–1.13)
INR PPP: 1.33 (ref 0.85–1.13)
LYMPHOCYTES ABSOLUTE: 0.5 THOU/MM3 (ref 1–4.8)
LYMPHOCYTES NFR BLD AUTO: 6.2 %
MAGNESIUM SERPL-MCNC: 1 MG/DL (ref 1.6–2.4)
MCF.EXTRINSIC BLD ROTEM: 58.6 MM (ref 52–70)
MCH RBC QN AUTO: 32.3 PG (ref 26–33)
MCH RBC QN AUTO: 33.6 PG (ref 26–33)
MCHC RBC AUTO-ENTMCNC: 31.6 GM/DL (ref 32.2–35.5)
MCHC RBC AUTO-ENTMCNC: 33.2 GM/DL (ref 32.2–35.5)
MCV RBC AUTO: 101 FL (ref 81–99)
MCV RBC AUTO: 102.4 FL (ref 81–99)
MONOCYTES ABSOLUTE: 0.5 THOU/MM3 (ref 0.4–1.3)
MONOCYTES NFR BLD AUTO: 5.5 %
NEUTROPHILS NFR BLD AUTO: 87.7 %
NRBC BLD AUTO-RTO: 0 /100 WBC
PLATELET # BLD AUTO: 193 THOU/MM3 (ref 130–400)
PLATELET # BLD AUTO: 267 THOU/MM3 (ref 130–400)
PMV BLD AUTO: 10 FL (ref 9.4–12.4)
PMV BLD AUTO: 10.2 FL (ref 9.4–12.4)
POTASSIUM SERPL-SCNC: 3.2 MEQ/L (ref 3.5–5.2)
POTASSIUM SERPL-SCNC: 3.8 MEQ/L (ref 3.5–5.2)
RBC # BLD AUTO: 2.94 MILL/MM3 (ref 4.2–5.4)
RBC # BLD AUTO: 3.87 MILL/MM3 (ref 4.2–5.4)
RH FACTOR: NORMAL
SEGMENTED NEUTROPHILS ABSOLUTE COUNT: 7.7 THOU/MM3 (ref 1.8–7.7)
SODIUM SERPL-SCNC: 144 MEQ/L (ref 135–145)
SODIUM SERPL-SCNC: 145 MEQ/L (ref 135–145)
WBC # BLD AUTO: 3.7 THOU/MM3 (ref 4.8–10.8)
WBC # BLD AUTO: 8.8 THOU/MM3 (ref 4.8–10.8)

## 2023-01-30 PROCEDURE — 85610 PROTHROMBIN TIME: CPT

## 2023-01-30 PROCEDURE — 85014 HEMATOCRIT: CPT

## 2023-01-30 PROCEDURE — P9017 PLASMA 1 DONOR FRZ W/IN 8 HR: HCPCS

## 2023-01-30 PROCEDURE — 6360000002 HC RX W HCPCS: Performed by: THORACIC SURGERY (CARDIOTHORACIC VASCULAR SURGERY)

## 2023-01-30 PROCEDURE — 94761 N-INVAS EAR/PLS OXIMETRY MLT: CPT

## 2023-01-30 PROCEDURE — 86901 BLOOD TYPING SEROLOGIC RH(D): CPT

## 2023-01-30 PROCEDURE — B44GZZ3 ULTRASONOGRAPHY OF LEFT LOWER EXTREMITY ARTERIES, INTRAVASCULAR: ICD-10-PCS | Performed by: THORACIC SURGERY (CARDIOTHORACIC VASCULAR SURGERY)

## 2023-01-30 PROCEDURE — 37226 HC FEMPOP PLASTY & STENT: CPT

## 2023-01-30 PROCEDURE — 75716 ARTERY X-RAYS ARMS/LEGS: CPT

## 2023-01-30 PROCEDURE — 85730 THROMBOPLASTIN TIME PARTIAL: CPT

## 2023-01-30 PROCEDURE — B41G1ZZ FLUOROSCOPY OF LEFT LOWER EXTREMITY ARTERIES USING LOW OSMOLAR CONTRAST: ICD-10-PCS | Performed by: THORACIC SURGERY (CARDIOTHORACIC VASCULAR SURGERY)

## 2023-01-30 PROCEDURE — 86923 COMPATIBILITY TEST ELECTRIC: CPT

## 2023-01-30 PROCEDURE — 36430 TRANSFUSION BLD/BLD COMPNT: CPT

## 2023-01-30 PROCEDURE — P9016 RBC LEUKOCYTES REDUCED: HCPCS

## 2023-01-30 PROCEDURE — 87500 VANOMYCIN DNA AMP PROBE: CPT

## 2023-01-30 PROCEDURE — C1874 STENT, COATED/COV W/DEL SYS: HCPCS

## 2023-01-30 PROCEDURE — 37221 HC PLASTY ILIAC W STENT: CPT

## 2023-01-30 PROCEDURE — 85025 COMPLETE CBC W/AUTO DIFF WBC: CPT

## 2023-01-30 PROCEDURE — 2700000000 HC OXYGEN THERAPY PER DAY

## 2023-01-30 PROCEDURE — 80048 BASIC METABOLIC PNL TOTAL CA: CPT

## 2023-01-30 PROCEDURE — 047D34Z DILATION OF LEFT COMMON ILIAC ARTERY WITH DRUG-ELUTING INTRALUMINAL DEVICE, PERCUTANEOUS APPROACH: ICD-10-PCS | Performed by: THORACIC SURGERY (CARDIOTHORACIC VASCULAR SURGERY)

## 2023-01-30 PROCEDURE — 87070 CULTURE OTHR SPECIMN AEROBIC: CPT

## 2023-01-30 PROCEDURE — 85027 COMPLETE CBC AUTOMATED: CPT

## 2023-01-30 PROCEDURE — X27L385 DILATION OF PROXIMAL LEFT POPLITEAL ARTERY WITH SUSTAINED RELEASE DRUG-ELUTING INTRALUMINAL DEVICE, PERCUTANEOUS APPROACH, NEW TECHNOLOGY GROUP 5: ICD-10-PCS | Performed by: THORACIC SURGERY (CARDIOTHORACIC VASCULAR SURGERY)

## 2023-01-30 PROCEDURE — 047J34Z DILATION OF LEFT EXTERNAL ILIAC ARTERY WITH DRUG-ELUTING INTRALUMINAL DEVICE, PERCUTANEOUS APPROACH: ICD-10-PCS | Performed by: THORACIC SURGERY (CARDIOTHORACIC VASCULAR SURGERY)

## 2023-01-30 PROCEDURE — 37252 INTRVASC US NONCORONARY 1ST: CPT

## 2023-01-30 PROCEDURE — 85018 HEMOGLOBIN: CPT

## 2023-01-30 PROCEDURE — 93005 ELECTROCARDIOGRAM TRACING: CPT | Performed by: STUDENT IN AN ORGANIZED HEALTH CARE EDUCATION/TRAINING PROGRAM

## 2023-01-30 PROCEDURE — 04LL3DZ OCCLUSION OF LEFT FEMORAL ARTERY WITH INTRALUMINAL DEVICE, PERCUTANEOUS APPROACH: ICD-10-PCS | Performed by: THORACIC SURGERY (CARDIOTHORACIC VASCULAR SURGERY)

## 2023-01-30 PROCEDURE — 85576 BLOOD PLATELET AGGREGATION: CPT

## 2023-01-30 PROCEDURE — B41F1ZZ FLUOROSCOPY OF RIGHT LOWER EXTREMITY ARTERIES USING LOW OSMOLAR CONTRAST: ICD-10-PCS | Performed by: THORACIC SURGERY (CARDIOTHORACIC VASCULAR SURGERY)

## 2023-01-30 PROCEDURE — 2000000000 HC ICU R&B

## 2023-01-30 PROCEDURE — 86900 BLOOD TYPING SEROLOGIC ABO: CPT

## 2023-01-30 PROCEDURE — 2580000003 HC RX 258: Performed by: THORACIC SURGERY (CARDIOTHORACIC VASCULAR SURGERY)

## 2023-01-30 PROCEDURE — 75625 CONTRAST EXAM ABDOMINL AORTA: CPT

## 2023-01-30 PROCEDURE — C1889 IMPLANT/INSERT DEVICE, NOC: HCPCS

## 2023-01-30 PROCEDURE — 85390 FIBRINOLYSINS SCREEN I&R: CPT

## 2023-01-30 PROCEDURE — 6360000004 HC RX CONTRAST MEDICATION: Performed by: THORACIC SURGERY (CARDIOTHORACIC VASCULAR SURGERY)

## 2023-01-30 PROCEDURE — 83735 ASSAY OF MAGNESIUM: CPT

## 2023-01-30 PROCEDURE — 85384 FIBRINOGEN ACTIVITY: CPT

## 2023-01-30 PROCEDURE — 36415 COLL VENOUS BLD VENIPUNCTURE: CPT

## 2023-01-30 PROCEDURE — 2709999900 IR ABDOMINAL AORTOBIFEMORAL CATHETER SERIALOGRAM

## 2023-01-30 PROCEDURE — 37223 HC PLASTY ILIAC W STENT EA ADDL: CPT

## 2023-01-30 PROCEDURE — X27J385 DILATION OF LEFT FEMORAL ARTERY WITH SUSTAINED RELEASE DRUG-ELUTING INTRALUMINAL DEVICE, PERCUTANEOUS APPROACH, NEW TECHNOLOGY GROUP 5: ICD-10-PCS | Performed by: THORACIC SURGERY (CARDIOTHORACIC VASCULAR SURGERY)

## 2023-01-30 PROCEDURE — 6370000000 HC RX 637 (ALT 250 FOR IP): Performed by: STUDENT IN AN ORGANIZED HEALTH CARE EDUCATION/TRAINING PROGRAM

## 2023-01-30 PROCEDURE — C1725 CATH, TRANSLUMIN NON-LASER: HCPCS

## 2023-01-30 PROCEDURE — 37244 VASC EMBOLIZE/OCCLUDE BLEED: CPT

## 2023-01-30 PROCEDURE — 2580000003 HC RX 258: Performed by: STUDENT IN AN ORGANIZED HEALTH CARE EDUCATION/TRAINING PROGRAM

## 2023-01-30 PROCEDURE — 6360000002 HC RX W HCPCS: Performed by: STUDENT IN AN ORGANIZED HEALTH CARE EDUCATION/TRAINING PROGRAM

## 2023-01-30 PROCEDURE — 85347 COAGULATION TIME ACTIVATED: CPT

## 2023-01-30 PROCEDURE — 86850 RBC ANTIBODY SCREEN: CPT

## 2023-01-30 PROCEDURE — 36247 INS CATH ABD/L-EXT ART 3RD: CPT

## 2023-01-30 PROCEDURE — 82330 ASSAY OF CALCIUM: CPT

## 2023-01-30 PROCEDURE — 74174 CTA ABD&PLVS W/CONTRAST: CPT

## 2023-01-30 RX ORDER — PROTAMINE SULFATE 10 MG/ML
INJECTION, SOLUTION INTRAVENOUS
Status: COMPLETED | OUTPATIENT
Start: 2023-01-30 | End: 2023-01-30

## 2023-01-30 RX ORDER — POTASSIUM CHLORIDE 20 MEQ/1
40 TABLET, EXTENDED RELEASE ORAL PRN
Status: DISCONTINUED | OUTPATIENT
Start: 2023-01-30 | End: 2023-02-04 | Stop reason: HOSPADM

## 2023-01-30 RX ORDER — ONDANSETRON 2 MG/ML
4 INJECTION INTRAMUSCULAR; INTRAVENOUS EVERY 6 HOURS PRN
Status: DISCONTINUED | OUTPATIENT
Start: 2023-01-30 | End: 2023-02-04 | Stop reason: HOSPADM

## 2023-01-30 RX ORDER — ONDANSETRON 4 MG/1
4 TABLET, ORALLY DISINTEGRATING ORAL EVERY 8 HOURS PRN
Status: DISCONTINUED | OUTPATIENT
Start: 2023-01-30 | End: 2023-02-04 | Stop reason: HOSPADM

## 2023-01-30 RX ORDER — SODIUM CHLORIDE 0.9 % (FLUSH) 0.9 %
5-40 SYRINGE (ML) INJECTION PRN
Status: DISCONTINUED | OUTPATIENT
Start: 2023-01-30 | End: 2023-02-04 | Stop reason: HOSPADM

## 2023-01-30 RX ORDER — HEPARIN SODIUM 1000 [USP'U]/ML
INJECTION, SOLUTION INTRAVENOUS; SUBCUTANEOUS
Status: COMPLETED | OUTPATIENT
Start: 2023-01-30 | End: 2023-01-30

## 2023-01-30 RX ORDER — POTASSIUM CHLORIDE 7.45 MG/ML
10 INJECTION INTRAVENOUS PRN
Status: DISCONTINUED | OUTPATIENT
Start: 2023-01-30 | End: 2023-02-04 | Stop reason: HOSPADM

## 2023-01-30 RX ORDER — CLOPIDOGREL BISULFATE 75 MG/1
75 TABLET ORAL DAILY
Status: DISCONTINUED | OUTPATIENT
Start: 2023-01-31 | End: 2023-01-31

## 2023-01-30 RX ORDER — ONDANSETRON 2 MG/ML
4 INJECTION INTRAMUSCULAR; INTRAVENOUS ONCE
Status: DISCONTINUED | OUTPATIENT
Start: 2023-01-30 | End: 2023-02-04 | Stop reason: HOSPADM

## 2023-01-30 RX ORDER — 0.9 % SODIUM CHLORIDE 0.9 %
500 INTRAVENOUS SOLUTION INTRAVENOUS ONCE
Status: COMPLETED | OUTPATIENT
Start: 2023-01-30 | End: 2023-01-30

## 2023-01-30 RX ORDER — FENTANYL CITRATE 50 UG/ML
INJECTION, SOLUTION INTRAMUSCULAR; INTRAVENOUS
Status: COMPLETED | OUTPATIENT
Start: 2023-01-30 | End: 2023-01-30

## 2023-01-30 RX ORDER — BUDESONIDE 0.5 MG/2ML
500 INHALANT ORAL 2 TIMES DAILY
Status: DISCONTINUED | OUTPATIENT
Start: 2023-01-30 | End: 2023-02-04 | Stop reason: HOSPADM

## 2023-01-30 RX ORDER — SODIUM CHLORIDE 450 MG/100ML
INJECTION, SOLUTION INTRAVENOUS CONTINUOUS
Status: DISCONTINUED | OUTPATIENT
Start: 2023-01-30 | End: 2023-02-01

## 2023-01-30 RX ORDER — MIDAZOLAM HYDROCHLORIDE 1 MG/ML
INJECTION INTRAMUSCULAR; INTRAVENOUS
Status: COMPLETED | OUTPATIENT
Start: 2023-01-30 | End: 2023-01-30

## 2023-01-30 RX ORDER — ONDANSETRON 2 MG/ML
4 INJECTION INTRAMUSCULAR; INTRAVENOUS ONCE
Status: COMPLETED | OUTPATIENT
Start: 2023-01-30 | End: 2023-01-30

## 2023-01-30 RX ORDER — CLOPIDOGREL BISULFATE 75 MG/1
75 TABLET ORAL DAILY
Qty: 30 TABLET | Refills: 5 | Status: SHIPPED | OUTPATIENT
Start: 2023-01-31

## 2023-01-30 RX ORDER — MAGNESIUM SULFATE IN WATER 40 MG/ML
2000 INJECTION, SOLUTION INTRAVENOUS PRN
Status: DISCONTINUED | OUTPATIENT
Start: 2023-01-30 | End: 2023-02-04 | Stop reason: HOSPADM

## 2023-01-30 RX ORDER — SODIUM CHLORIDE 9 MG/ML
INJECTION, SOLUTION INTRAVENOUS CONTINUOUS
Status: ACTIVE | OUTPATIENT
Start: 2023-01-30 | End: 2023-01-30

## 2023-01-30 RX ORDER — ACETAMINOPHEN 325 MG/1
650 TABLET ORAL EVERY 4 HOURS PRN
Status: DISCONTINUED | OUTPATIENT
Start: 2023-01-30 | End: 2023-02-04 | Stop reason: HOSPADM

## 2023-01-30 RX ORDER — ONDANSETRON 2 MG/ML
INJECTION INTRAMUSCULAR; INTRAVENOUS
Status: COMPLETED | OUTPATIENT
Start: 2023-01-30 | End: 2023-01-30

## 2023-01-30 RX ORDER — MONTELUKAST SODIUM 10 MG/1
10 TABLET ORAL NIGHTLY
Status: DISCONTINUED | OUTPATIENT
Start: 2023-01-30 | End: 2023-02-04 | Stop reason: HOSPADM

## 2023-01-30 RX ORDER — SODIUM CHLORIDE 9 MG/ML
INJECTION, SOLUTION INTRAVENOUS PRN
Status: DISCONTINUED | OUTPATIENT
Start: 2023-01-30 | End: 2023-02-04 | Stop reason: HOSPADM

## 2023-01-30 RX ORDER — ALBUTEROL SULFATE 90 UG/1
2 AEROSOL, METERED RESPIRATORY (INHALATION) EVERY 4 HOURS PRN
Status: DISCONTINUED | OUTPATIENT
Start: 2023-01-30 | End: 2023-02-04 | Stop reason: HOSPADM

## 2023-01-30 RX ORDER — OXYCODONE HYDROCHLORIDE 5 MG/1
10 TABLET ORAL EVERY 4 HOURS PRN
Status: DISCONTINUED | OUTPATIENT
Start: 2023-01-30 | End: 2023-02-04 | Stop reason: HOSPADM

## 2023-01-30 RX ORDER — SODIUM CHLORIDE 0.9 % (FLUSH) 0.9 %
5-40 SYRINGE (ML) INJECTION EVERY 12 HOURS SCHEDULED
Status: DISCONTINUED | OUTPATIENT
Start: 2023-01-30 | End: 2023-02-04 | Stop reason: HOSPADM

## 2023-01-30 RX ORDER — OXYCODONE HYDROCHLORIDE 5 MG/1
5 TABLET ORAL EVERY 4 HOURS PRN
Status: DISCONTINUED | OUTPATIENT
Start: 2023-01-30 | End: 2023-02-04 | Stop reason: HOSPADM

## 2023-01-30 RX ORDER — CLOPIDOGREL BISULFATE 75 MG/1
300 TABLET ORAL ONCE
Status: DISCONTINUED | OUTPATIENT
Start: 2023-01-30 | End: 2023-01-30

## 2023-01-30 RX ADMIN — IOPAMIDOL 80 ML: 755 INJECTION, SOLUTION INTRAVENOUS at 17:17

## 2023-01-30 RX ADMIN — SODIUM CHLORIDE: 4.5 INJECTION, SOLUTION INTRAVENOUS at 10:56

## 2023-01-30 RX ADMIN — MIDAZOLAM 0.5 MG: 1 INJECTION INTRAMUSCULAR; INTRAVENOUS at 14:38

## 2023-01-30 RX ADMIN — MIDAZOLAM 1 MG: 1 INJECTION INTRAMUSCULAR; INTRAVENOUS at 13:10

## 2023-01-30 RX ADMIN — MIDAZOLAM 1 MG: 1 INJECTION INTRAMUSCULAR; INTRAVENOUS at 12:30

## 2023-01-30 RX ADMIN — HEPARIN SODIUM 2500 UNITS: 1000 INJECTION INTRAVENOUS; SUBCUTANEOUS at 18:17

## 2023-01-30 RX ADMIN — MIDAZOLAM 1 MG: 1 INJECTION INTRAMUSCULAR; INTRAVENOUS at 13:32

## 2023-01-30 RX ADMIN — IOPAMIDOL 150 ML: 612 INJECTION, SOLUTION INTRAVENOUS at 15:25

## 2023-01-30 RX ADMIN — ONDANSETRON 4 MG: 2 INJECTION INTRAMUSCULAR; INTRAVENOUS at 20:04

## 2023-01-30 RX ADMIN — FENTANYL CITRATE 50 MCG: 50 INJECTION, SOLUTION INTRAMUSCULAR; INTRAVENOUS at 12:30

## 2023-01-30 RX ADMIN — FENTANYL CITRATE 25 MCG: 50 INJECTION, SOLUTION INTRAMUSCULAR; INTRAVENOUS at 14:38

## 2023-01-30 RX ADMIN — POTASSIUM CHLORIDE 40 MEQ: 1500 TABLET, EXTENDED RELEASE ORAL at 22:56

## 2023-01-30 RX ADMIN — ONDANSETRON 4 MG: 2 INJECTION INTRAMUSCULAR; INTRAVENOUS at 15:04

## 2023-01-30 RX ADMIN — MAGNESIUM SULFATE HEPTAHYDRATE 2000 MG: 40 INJECTION, SOLUTION INTRAVENOUS at 23:04

## 2023-01-30 RX ADMIN — SODIUM CHLORIDE 500 ML: 9 INJECTION, SOLUTION INTRAVENOUS at 16:45

## 2023-01-30 RX ADMIN — HEPARIN SODIUM 2000 UNITS: 1000 INJECTION INTRAVENOUS; SUBCUTANEOUS at 13:53

## 2023-01-30 RX ADMIN — ONDANSETRON 4 MG: 2 INJECTION INTRAMUSCULAR; INTRAVENOUS at 18:12

## 2023-01-30 RX ADMIN — HEPARIN SODIUM 5600 UNITS: 1000 INJECTION INTRAVENOUS; SUBCUTANEOUS at 12:56

## 2023-01-30 RX ADMIN — Medication 20 MG: at 15:12

## 2023-01-30 RX ADMIN — CALCIUM GLUCONATE 4000 MG: 98 INJECTION, SOLUTION INTRAVENOUS at 22:51

## 2023-01-30 RX ADMIN — FENTANYL CITRATE 50 MCG: 50 INJECTION, SOLUTION INTRAMUSCULAR; INTRAVENOUS at 13:10

## 2023-01-30 RX ADMIN — FENTANYL CITRATE 50 MCG: 50 INJECTION, SOLUTION INTRAMUSCULAR; INTRAVENOUS at 13:32

## 2023-01-30 ASSESSMENT — PAIN DESCRIPTION - PAIN TYPE: TYPE: ACUTE PAIN

## 2023-01-30 ASSESSMENT — PAIN SCALES - GENERAL: PAINLEVEL_OUTOF10: 4

## 2023-01-30 ASSESSMENT — PAIN DESCRIPTION - ORIENTATION: ORIENTATION: MID;LOWER

## 2023-01-30 ASSESSMENT — PAIN DESCRIPTION - ONSET: ONSET: GRADUAL

## 2023-01-30 ASSESSMENT — PAIN DESCRIPTION - LOCATION: LOCATION: ABDOMEN

## 2023-01-30 ASSESSMENT — PAIN DESCRIPTION - DESCRIPTORS: DESCRIPTORS: TENDER

## 2023-01-30 NOTE — PLAN OF CARE
Problem: Discharge Planning  Goal: Discharge to home or other facility with appropriate resources  Outcome: Progressing  Flowsheets (Taken 1/30/2023 1040)  Discharge to home or other facility with appropriate resources:   Identify barriers to discharge with patient and caregiver   Arrange for needed discharge resources and transportation as appropriate   Identify discharge learning needs (meds, wound care, etc)     Problem: Neurosensory - Adult  Goal: Achieves maximal functionality and self care  Outcome: Progressing     Problem: Cardiovascular - Adult  Goal: Maintains optimal cardiac output and hemodynamic stability  Outcome: Progressing     Problem: Skin/Tissue Integrity - Adult  Goal: Incisions, wounds, or drain sites healing without S/S of infection  Outcome: Progressing     Problem: Musculoskeletal - Adult  Goal: Return mobility to safest level of function  Outcome: Progressing  Goal: Return ADL status to a safe level of function  Outcome: Progressing     Problem: Gastrointestinal - Adult  Goal: Minimal or absence of nausea and vomiting  Outcome: Progressing     Problem: Genitourinary - Adult  Goal: Absence of urinary retention  Outcome: Progressing     Problem: Infection - Adult  Goal: Absence of infection at discharge  Outcome: Progressing   . Marito Elias Care plan reviewed with patient and son. Patient and son verbalize understanding of the plan of care and contribute to goal setting.

## 2023-01-30 NOTE — PROGRESS NOTES
1140 Patient received in IR for procedure. 1146 This procedure has been fully reviewed with the patient and written informed consent has been obtained. 1155 Patient prepped for procedure. [address] Procedure started with Dr. Clementina Miller.  0860 Access obtained in right femoral with use of sonosite. 1307 ACT running. 1312 , Dr Clementina Miller notified. 1326 Left posterior tibial access obtained with use of sonosite. 1337 Angioplasty of SFA using Gilmer 4 x 120 balloon. 1342 Angioplasty of SFA/popliteal using Gilmer 4 x 120 balloon. 1343 ACT running. 1348 , Dr Clementina Miller notified. 1403 Emi 6mm x 150mm stent deployed to distal SFA. 1405 Emi 6mm x 150mm stent deployed to mid SFA. 1411 Emi 6mm x 120mm stent deployed to proximal SFA. 1413 Post stent dilation using Gilmer 6 x 200 balloon. 1426 IVUS being used in external iliac per Dr Clementina Miller.   1437 Amy Juliette 7mm x 100mm stent deployed to external iliac. 1440 Post stent dilation using Gilmer 7 x 120 balloon. 1449 ACT running. 1454 , Dr Mcrae First notified. 1454 6Fr sheath exchanged for 7 Fr sheath inserted into right femoral.   1459 VBX 7mm x 39mm stent deployed to common iliac.   1503 ACT running. 1509 , Dr Mcrae First notified. 1519 Procedure completed; patient tolerated well. ACT running. Right femoral sheath removed, manual pressure held per Dr. Clementina Miller. Left PT sheath removed, manual pressure held per Yadira Medina, RT.  1523 , Dr Clementina Miller notified. 1526 Manual pressure discontinued to left PT. Quick clot, guaze, and op-site applied; no bleeding noted. 1528 Manual pressure discontinued to right femoral site. Safe guard applied; 40ml air instilled. 1538 Patient on bed; comfort ensured. Right safeguard dressing remains dry and intact with area soft. Left PT dressing remains dry and intact with area soft. Pt placed on bedpan. 1542 Patient finished using bedpan. Bedpan removed and patient cleaned up.    [address] [facility] Addended by: Mark Burch on: 3/6/2020 01:38 PM     Modules accepted: Orders Report called to Neelam on 2E.  1551 Patient taken to 2E via bed. Right safeguard dressing remains dry and intact with area soft. Left PT dressing remains dry and intact with area soft.

## 2023-01-30 NOTE — FLOWSHEET NOTE
1000 Patient admitted to [facility]  Ambulatory for left lower extremity angiogram  Patient NPO. Patient accompanied by noone  Vital signs obtained. Assessment and data collection intiated. Oriented to room. Policies and procedures for 2E explained. All questions answered with no further questions at this time. Fall precautions reviewed with patient. 1000 Care plan reviewed with patient. Patient  verbalize understanding of the plan of care and contribute to goal setting. Limb alert band applied to right arm and sign posted. 1140 to cath lab per bed, stable condition. 1546 Report received from [address].    1600 care taken over from [address], site with safe guard to right groin was rigid and tender, hematoma formed. Deonna notified Dr Suzy Busby and [facility] RN, held manual pressure removing safeguard. Patient heart rate was 27-33, no atropine ordered at this time. Patient pale and nauseated. Zofran given. Dr Suzy Busby at bedside. Right groin site soft to touch. Pressure dressing and quick clot applied by Dr Suzy Busby.     [address] patient developed mid/left abdominal pain. Abdomen on inspection was tender and firm. Patient is nauseated and HR 28-35. Dr Suzy Busby notified . BP dropping from baseline. Dr Suzy Busby informed. 1 Dr Suzy Busby at bedside. Ordered 0.9 NS bolus STAT CT scan. [address] [place] [address] patient to CT scan monitored with Chidi Wilson from cath lab. The decision was made to take patient back to IR by Dr Suzy Busby.  Dr Suzy Busby notified daughter of patient at that time. IR staff notified admitting of needed ICU bed. 1830 Patient belongings were taken to ICU. Necklace and bracelet put in denture cup inside one of the bags. Cell phone in bag and medications.   Clothes and shoes

## 2023-01-30 NOTE — PROGRESS NOTES
1738 Patient received in IR for procedure. Reviewed with patient that she does not want blood. Pt stated that she is okay with receiving blood. 18 Dr. Nathan Rios called the patient's daughter. 1745 Fluids running at 999mL/hr per Dr. Nathan Rios.   7670 Procedure started with Dr. Nathan Rios.  1750 Access with use of sonosite. 1800 Blood removed to send to the lab.   1804 Blood sent to lab.   1825 Patient states she cannot breath. Dr. Nathan Rios gave verbal order to turn fluid down to 75mL/hr.  1828 Blood pressure dropped. Dr. Nathan Rios ordered fluids to 100mL/hr.   1834 ACT started. 1838 . Dr. Nathan Rios notified. [address] Rios notified of hemoglobin. 46 Dr. Nathan Rios had this RN order the patient for a blood transfusion. 1848 BP 81/50 Dr. Nathan Rios notified. Fluids increased to 999mL/hr.   1854 VortX coil 3mm x 3.3mm deployed to left medial circumflex iliac artery. 1858 VortX coil 3mm x 3.3mm deployed to left medial circumflex iliac artery. 1903 VortX coil 5mm x 5.5 mm deployed to left medial circumflex iliac artery. 1906 ACT started. 1910 . Dr. Nathan Rios notified. 1912 Procedure completed; patient tolerated well. Manual pressure held by RT Kimberley(R).   1929 Manual pressure discontinued. Iv fluid turned down to 700mL/hr.   1936 4x4, op-site applied. 1936 Patient on bed; comfort ensured. Right femoral dressing remains dry and intact with area soft. 1936 Report called to [address] on [address] turned down to 300mL/hr.   1949 Patient taken to 4D via bed with family at bedside. Right femoral dressing remains dry and intact with area soft.

## 2023-01-30 NOTE — H&P
CT/CV Surgery H&P    1/30/2023 9:54 AM  Surgeon:  Dr. Ferguson Body    HPI:    Ms. Valerio Matos is scheduled for Aortogram, left lower extremity angiogram and possible intervention  today. 69year old female with PMHx of Right Breast CA, s/p XRT, COPD on O2 at night, HTN, anxiety disorder, past heavy tobacco use, GERD, and atrial fibrillation on Eliquis presents for evaluation of left foot ulcer and PAD. She states that several months ago, she bought new shoes, and following that, she developed bilateral heel blisters, with the left heel proceeding to an open ulcer. It failed to heal, despite local wound care with Neosporin. No cellulitis, drainage, or antibiotics. She was seen by her PCP, and then referred to Dr. Christine Mclaughlin DPM.  He felt that she had poor distal pulses, and PAD and thus ordered an ROBERT exam at [facility]:     Arterial duplex:     1. Moderately diminished ABIs right side. Markedly diminished ABIs left side. 2. No definite stenosis is seen in the right leg. Cannot exclude trifurcation disease. 3. Findings of moderate to high-grade stenosis in the proximal left SFA. 4. Aortofemoral angiography is recommended for further evaluation with possible intervention. If desired, this can be scheduled through the Interventional scheduling desk of [address] [place] [address] Radiology Department ([phone]). The wound care was switched to betadine, but it was too painful, so she's back on Neosporin. She states that she has had cramping in her feet and bilateral calf areas L>R for several years along with rest pain. No nocturnal pain noted. She reports no previous ulcers prior to this year, and also admits numbness, tingling and coolness. She also has bilateral R=L edema in calf with improvement with elevation. No VV's or DVT. Vital Signs: There were no vitals taken for this visit. No data recorded.     Labs:   CBC: No results for input(s): WBC, HGB, HCT, MCV, PLT, APTT, PROTIME, INR in the last 72 hours.  BMP: No results for input(s): NA, K, CL, CO2, PHOS, BUN, CREATININE, CA, MG, POCGLU in the last 72 hours. Last HgA1C: No results found for: LABA1C  Imaging:  Results for orders placed in visit on 18    XR CHEST STANDARD (2 VW)    Narrative  Ordering Provider: [employer] [facility] [address]    Radiology Department  Patient:  Nimisha Singh. :  1945   Sex: [place], [address] [facility] [address]  Location:  [place] [id number]   Unit #:   [id number]  Acct #:  [de-identified]  Ordering Phys: Cortez Powell MD    Exam Date: 18  Accession #:  [id number]  Exam:  MAIN   XR Chest (PA ^ LAT) Min 2 View  Result:    STUDY:   X-RAY CHEST    REASON FOR EXAM:   Female, 71years old. Chronic shortness of breath. TECHNIQUE:   PA and lateral views of the chest.    COMPARISON:   2015  ___________________________________    FINDINGS:    There is hyperinflation of the lungs consistent with chronic obstructive  lung disease (COPD). Lungs are clear. There is no demonstrated pleural  abnormality. Normal size heart. Normal mediastinum and cathy. Normal visualized  pulmonary arteries. Normal visualized aortic arch and descending thoracic  aorta. There are diffuse degenerative changes of the visualized thoracic spine. There is degenerative osteoarthritis of the bilateral shoulders.     There is no demonstrated abnormality of the visualized soft tissue  structures of the upper abdomen.  ___________________________________    IMPRESSION:  COPD without acute findings    Electronically Signed:  Kendra Lucas DO   at 8:13 EST  Tel [phone], Service support  [phone], Fax [phone]          cc: Carol Middleton MD; Cortez Powell MD      Dictated by:  Noman Powell DO on 18  Technologist:  Tien David RT (R)  Transcribed by:  Noman Powell on 18    Report Signed by:  Timi Posada on 18 0813    Home Meds:  Prior to Admission medications Medication Sig Start Date End Date Taking? Authorizing Provider   Neomycin-Bacitracin-Polymyxin (HCA TRIPLE ANTIBIOTIC OINTMENT EX) Apply topically as needed    Historical Provider, MD   azithromycin (ZITHROMAX) 250 MG tablet Take 250 mg by mouth daily    Historical Provider, MD   potassium chloride (KLOR-CON) 20 MEQ packet Take 20 mEq by mouth 2 times daily    Historical Provider, MD   ELIQUIS 5 MG TABS tablet Take 5 mg by mouth 2 times daily 4/19/21   Historical Provider, MD   Zinc Acetate, Oral, (ZINC ACETATE PO) Take by mouth    Historical Provider, MD   Ascorbic Acid (VITAMIN C) 250 MG tablet Take 250 mg by mouth daily    Historical Provider, MD   NONFORMULARY Neuroflow plus    Historical Provider, MD   Biotin 2500 MCG CAPS Take by mouth    Historical Provider, MD   Hydroxyurea (HYDREA PO) Take by mouth daily    Historical Provider, MD   Atorvastatin Calcium (LIPITOR PO) Take by mouth daily    Historical Provider, MD   guaiFENesin (MUCINEX) 600 MG extended release tablet Take 1,200 mg by mouth 2 times daily    Historical Provider, MD   nystatin (MYCOSTATIN) 776699 UNIT/ML suspension Take 500,000 Units by mouth 4 times daily    Historical Provider, MD   Multiple Vitamins-Minerals (CENTRUM SILVER PO) Take 1 tablet by mouth daily    Historical Provider, MD   vitamin D (CHOLECALCIFEROL) 1000 UNIT TABS tablet Take 1,000 Units by mouth daily    Historical Provider, MD   budesonide (PULMICORT) 0.5 MG/2ML nebulizer suspension Take 1 ampule by nebulization 2 times daily    Historical Provider, MD   verapamil (VERELAN) 240 MG extended release capsule Take 240 mg by mouth nightly    Historical Provider, MD   montelukast (SINGULAIR) 10 MG tablet Take 10 mg by mouth nightly    Historical Provider, MD   ALPRAZolam (XANAX) 0.5 MG tablet Take 0.5 mg by mouth 3 times daily as needed for Sleep.     Historical Provider, MD   benzonatate (TESSALON) 100 MG capsule Take 100 mg by mouth 3 times daily as needed for Cough Historical Provider, MD   albuterol sulfate  (90 Base) MCG/ACT inhaler Inhale 2 puffs into the lungs every 4 hours as needed for Wheezing    Historical Provider, MD   sennosides-docusate sodium (SENOKOT-S) 8.6-50 MG tablet Take 1 tablet by mouth daily as needed for Constipation    Historical Provider, MD   NONFORMULARY Eye drops as needed FOR DRY EYE    Historical Provider, MD   fluticasone (FLONASE) 50 MCG/ACT nasal spray 1 spray by Nasal route daily as needed for Rhinitis    Historical Provider, MD   gabapentin (NEURONTIN) 100 MG capsule Take 100 mg by mouth 3 times daily. Historical Provider, MD     PastMedical History:  Leah Henriquez  has a past medical history of Abnormal weight gain, Adjustment disorder, Anemia, Anxiety, Breast cancer ([facility] [address] Cancer ([facility] [address] COPD (chronic obstructive pulmonary disease) ([facility] [address] GERD (gastroesophageal reflux disease), Heart palpitations, History of therapeutic radiation, Hypertension, Hypokalemia, and Tinea unguium. Past Surgical History:  The patient  has a past surgical history that includes other surgical history; Lung biopsy (2016); hernia repair; other surgical history (2000); pr mastectomy partial (Right, 3/15/2018); other surgical history (05/09/2018); pr office/outpt visit,procedure only (N/A, 5/9/2018); Elbow surgery; Breast surgery (Right, 4/9/2019); US BREAST BIOPSY W LOC DEVICE 1ST LESION LEFT (Left, 2019); and US BREAST BIOPSY W LOC DEVICE 1ST LESION RIGHT (Right, 2019). Allergies: The patient is allergic to brethine [terbutaline], norco [hydrocodone-acetaminophen], and keflex [cephalexin]. Family History: This patient's family history includes Breast Cancer (age of onset: 28) in her mother; Breast Cancer (age of onset: 39) in her sister; Cancer in her mother; Diabetes in her maternal grandmother; Liver Disease in her sister; Other in her brother, father, and sister. Social History:  Leah Henriquez  reports that she quit smoking about 18 months ago.  Her smoking use included cigarettes. She smoked an average of 2 packs per day. She has never used smokeless tobacco. She reports current drug use. Drug: Marijuana [place]id [place]). She reports that she does not drink alcohol. Review of Systems   Constitutional:  Positive for activity change and fatigue. HENT: Negative. Respiratory:  Positive for shortness of breath and wheezing. Cardiovascular: Negative. Gastrointestinal: Negative. Genitourinary: Negative. Musculoskeletal:  Positive for arthralgias and myalgias. Skin:  Positive for color change and wound. Allergic/Immunologic: Negative. Neurological: Negative. Hematological: Negative. Psychiatric/Behavioral: Negative. Physical Exam  Constitutional:       Appearance: Normal appearance. She is normal weight. HENT:      Head: Normocephalic and atraumatic. Neck:      Vascular: No carotid bruit. Cardiovascular:      Rate and Rhythm: Rhythm irregular. Comments: No palpable pulses  Right: DP/TP/digitals biphasic  No ulcers     Left: DP/TP/digitals monophasic  +eschar on left upper heal.  No redness, warmth. Musculoskeletal:      Cervical back: Normal range of motion and neck supple. Right lower leg: No edema. Left lower leg: No edema. Lymphadenopathy:      Cervical: No cervical adenopathy. Skin:     Findings: Lesion present. Comments: +eschar on left upper heal.  No redness, warmth. Neurological:      General: No focal deficit present. Mental Status: She is alert. Mental status is at baseline. Psychiatric:         Mood and Affect: Mood normal.         Behavior: Behavior normal.         Judgment: Judgment normal.     Assessment:     1. PVD (peripheral vascular disease) with claudication ([address]    2. Localized edema    3. Coronary artery disease involving native coronary artery of native heart without angina pectoris    4.  Athscl native arteries of left leg w ulceration oth prt foot ([address]      Plan: 1/30/23    Schedule Aortogram, left lower extremity angiogram, and possible endovascular intervention in IR suite in 1-2 weeks. Hold Eliquis for 4 days prior to procedure.       Electronically signed by Ene Okeefe MD on 1/30/2023 at 9:54 AM

## 2023-01-31 LAB
ANION GAP SERPL CALC-SCNC: 15 MEQ/L (ref 8–16)
BASOPHILS ABSOLUTE: 0 THOU/MM3 (ref 0–0.1)
BASOPHILS NFR BLD AUTO: 0.2 %
BUN SERPL-MCNC: 19 MG/DL (ref 7–22)
CA-I BLD ISE-SCNC: 1.27 MMOL/L (ref 1.12–1.32)
CALCIUM SERPL-MCNC: 9.2 MG/DL (ref 8.5–10.5)
CHLORIDE SERPL-SCNC: 107 MEQ/L (ref 98–111)
CLOT ANGLE BLD TEG: 19.1 MM (ref 15–32)
CLOT INIT BLD TEG: 3.9 MINUTES (ref 4.6–9.1)
CLOT LYSIS 30M P MA LENFR BLD TEG: 0 % (ref 0–2.6)
CO2 SERPL-SCNC: 19 MEQ/L (ref 23–33)
CREAT SERPL-MCNC: 1 MG/DL (ref 0.4–1.2)
DEPRECATED RDW RBC AUTO: 47.7 FL (ref 35–45)
EOSINOPHIL NFR BLD AUTO: 0 %
EOSINOPHILS ABSOLUTE: 0 THOU/MM3 (ref 0–0.4)
ERYTHROCYTE [DISTWIDTH] IN BLOOD BY AUTOMATED COUNT: 12.7 % (ref 11.5–14.5)
GFR SERPL CREATININE-BSD FRML MDRD: 58 ML/MIN/1.73M2
GLUCOSE SERPL-MCNC: 138 MG/DL (ref 70–108)
HCT VFR BLD AUTO: 24.3 % (ref 37–47)
HCT VFR BLD AUTO: 25.5 % (ref 37–47)
HCT VFR BLD AUTO: 28.9 % (ref 37–47)
HCT VFR BLD AUTO: 31.7 % (ref 37–47)
HGB BLD-MCNC: 7.7 GM/DL (ref 12–16)
HGB BLD-MCNC: 8.5 GM/DL (ref 12–16)
HGB BLD-MCNC: 9.6 GM/DL (ref 12–16)
HGB BLD-MCNC: 9.7 GM/DL (ref 12–16)
IMM GRANULOCYTES # BLD AUTO: 0.04 THOU/MM3 (ref 0–0.07)
IMM GRANULOCYTES NFR BLD AUTO: 0.5 %
LYMPHOCYTES ABSOLUTE: 0.8 THOU/MM3 (ref 1–4.8)
LYMPHOCYTES NFR BLD AUTO: 9.1 %
MAGNESIUM SERPL-MCNC: 3 MG/DL (ref 1.6–2.4)
MCF.EXTRINSIC BLD ROTEM: 62.3 MM (ref 52–70)
MCH RBC QN AUTO: 32.4 PG (ref 26–33)
MCHC RBC AUTO-ENTMCNC: 31.7 GM/DL (ref 32.2–35.5)
MCV RBC AUTO: 102.1 FL (ref 81–99)
MONOCYTES ABSOLUTE: 0.5 THOU/MM3 (ref 0.4–1.3)
MONOCYTES NFR BLD AUTO: 5.7 %
MRSA DNA SPEC QL NAA+PROBE: NEGATIVE
NEUTROPHILS NFR BLD AUTO: 84.5 %
NRBC BLD AUTO-RTO: 0 /100 WBC
PLATELET # BLD AUTO: 225 THOU/MM3 (ref 130–400)
PMV BLD AUTO: 11 FL (ref 9.4–12.4)
POTASSIUM SERPL-SCNC: 5.3 MEQ/L (ref 3.5–5.2)
RBC # BLD AUTO: 2.38 MILL/MM3 (ref 4.2–5.4)
REASON FOR REJECTION: NORMAL
REASON FOR REJECTION: NORMAL
REJECTED TEST: NORMAL
REJECTED TEST: NORMAL
SEGMENTED NEUTROPHILS ABSOLUTE COUNT: 7.2 THOU/MM3 (ref 1.8–7.7)
SODIUM SERPL-SCNC: 141 MEQ/L (ref 135–145)
VANA ISLT/SPM QL: NEGATIVE
WBC # BLD AUTO: 8.5 THOU/MM3 (ref 4.8–10.8)

## 2023-01-31 PROCEDURE — 85018 HEMOGLOBIN: CPT

## 2023-01-31 PROCEDURE — 36430 TRANSFUSION BLD/BLD COMPNT: CPT

## 2023-01-31 PROCEDURE — 85384 FIBRINOGEN ACTIVITY: CPT

## 2023-01-31 PROCEDURE — 2060000000 HC ICU INTERMEDIATE R&B

## 2023-01-31 PROCEDURE — 83735 ASSAY OF MAGNESIUM: CPT

## 2023-01-31 PROCEDURE — 82330 ASSAY OF CALCIUM: CPT

## 2023-01-31 PROCEDURE — 87641 MR-STAPH DNA AMP PROBE: CPT

## 2023-01-31 PROCEDURE — 85576 BLOOD PLATELET AGGREGATION: CPT

## 2023-01-31 PROCEDURE — 85390 FIBRINOLYSINS SCREEN I&R: CPT

## 2023-01-31 PROCEDURE — 2580000003 HC RX 258: Performed by: THORACIC SURGERY (CARDIOTHORACIC VASCULAR SURGERY)

## 2023-01-31 PROCEDURE — 6370000000 HC RX 637 (ALT 250 FOR IP): Performed by: PHYSICIAN ASSISTANT

## 2023-01-31 PROCEDURE — 94761 N-INVAS EAR/PLS OXIMETRY MLT: CPT

## 2023-01-31 PROCEDURE — 2580000003 HC RX 258: Performed by: PHYSICIAN ASSISTANT

## 2023-01-31 PROCEDURE — 99291 CRITICAL CARE FIRST HOUR: CPT | Performed by: INTERNAL MEDICINE

## 2023-01-31 PROCEDURE — 85347 COAGULATION TIME ACTIVATED: CPT

## 2023-01-31 PROCEDURE — 6370000000 HC RX 637 (ALT 250 FOR IP): Performed by: STUDENT IN AN ORGANIZED HEALTH CARE EDUCATION/TRAINING PROGRAM

## 2023-01-31 PROCEDURE — 2580000003 HC RX 258: Performed by: STUDENT IN AN ORGANIZED HEALTH CARE EDUCATION/TRAINING PROGRAM

## 2023-01-31 PROCEDURE — 36415 COLL VENOUS BLD VENIPUNCTURE: CPT

## 2023-01-31 PROCEDURE — 85025 COMPLETE CBC W/AUTO DIFF WBC: CPT

## 2023-01-31 PROCEDURE — 2500000003 HC RX 250 WO HCPCS

## 2023-01-31 PROCEDURE — 2700000000 HC OXYGEN THERAPY PER DAY

## 2023-01-31 PROCEDURE — 94640 AIRWAY INHALATION TREATMENT: CPT

## 2023-01-31 PROCEDURE — 6360000002 HC RX W HCPCS: Performed by: STUDENT IN AN ORGANIZED HEALTH CARE EDUCATION/TRAINING PROGRAM

## 2023-01-31 PROCEDURE — 85014 HEMATOCRIT: CPT

## 2023-01-31 PROCEDURE — 80048 BASIC METABOLIC PNL TOTAL CA: CPT

## 2023-01-31 RX ORDER — NOREPINEPHRINE BIT/0.9 % NACL 16MG/250ML
INFUSION BOTTLE (ML) INTRAVENOUS
Status: COMPLETED
Start: 2023-01-31 | End: 2023-01-31

## 2023-01-31 RX ORDER — NOREPINEPHRINE BIT/0.9 % NACL 16MG/250ML
1-100 INFUSION BOTTLE (ML) INTRAVENOUS CONTINUOUS
Status: DISCONTINUED | OUTPATIENT
Start: 2023-01-31 | End: 2023-01-31

## 2023-01-31 RX ORDER — NOREPINEPHRINE BIT/0.9 % NACL 16MG/250ML
1-100 INFUSION BOTTLE (ML) INTRAVENOUS CONTINUOUS
Status: DISCONTINUED | OUTPATIENT
Start: 2023-01-31 | End: 2023-02-01

## 2023-01-31 RX ORDER — CLOPIDOGREL BISULFATE 75 MG/1
75 TABLET ORAL DAILY
Status: DISCONTINUED | OUTPATIENT
Start: 2023-01-31 | End: 2023-02-04 | Stop reason: HOSPADM

## 2023-01-31 RX ORDER — SODIUM CHLORIDE, SODIUM LACTATE, POTASSIUM CHLORIDE, AND CALCIUM CHLORIDE .6; .31; .03; .02 G/100ML; G/100ML; G/100ML; G/100ML
500 INJECTION, SOLUTION INTRAVENOUS ONCE
Status: COMPLETED | OUTPATIENT
Start: 2023-01-31 | End: 2023-01-31

## 2023-01-31 RX ORDER — SODIUM CHLORIDE 9 MG/ML
INJECTION, SOLUTION INTRAVENOUS PRN
Status: DISCONTINUED | OUTPATIENT
Start: 2023-01-31 | End: 2023-01-31

## 2023-01-31 RX ADMIN — ALBUTEROL SULFATE 2 PUFF: 90 AEROSOL, METERED RESPIRATORY (INHALATION) at 03:41

## 2023-01-31 RX ADMIN — SODIUM CHLORIDE, PRESERVATIVE FREE 10 ML: 5 INJECTION INTRAVENOUS at 19:59

## 2023-01-31 RX ADMIN — Medication 5 MCG/MIN: at 05:17

## 2023-01-31 RX ADMIN — BUDESONIDE 500 MCG: 0.5 INHALANT RESPIRATORY (INHALATION) at 09:07

## 2023-01-31 RX ADMIN — CLOPIDOGREL BISULFATE 75 MG: 75 TABLET ORAL at 19:57

## 2023-01-31 RX ADMIN — SODIUM CHLORIDE, PRESERVATIVE FREE 10 ML: 5 INJECTION INTRAVENOUS at 19:58

## 2023-01-31 RX ADMIN — MONTELUKAST SODIUM 10 MG: 10 TABLET ORAL at 00:21

## 2023-01-31 RX ADMIN — MAGNESIUM SULFATE HEPTAHYDRATE 2000 MG: 40 INJECTION, SOLUTION INTRAVENOUS at 01:41

## 2023-01-31 RX ADMIN — MONTELUKAST SODIUM 10 MG: 10 TABLET ORAL at 19:57

## 2023-01-31 RX ADMIN — SODIUM CHLORIDE, POTASSIUM CHLORIDE, SODIUM LACTATE AND CALCIUM CHLORIDE 500 ML: 600; 310; 30; 20 INJECTION, SOLUTION INTRAVENOUS at 04:12

## 2023-01-31 ASSESSMENT — PAIN DESCRIPTION - LOCATION: LOCATION: ABDOMEN

## 2023-01-31 ASSESSMENT — PAIN SCALES - GENERAL
PAINLEVEL_OUTOF10: 3
PAINLEVEL_OUTOF10: 2
PAINLEVEL_OUTOF10: 0
PAINLEVEL_OUTOF10: 0

## 2023-01-31 ASSESSMENT — PAIN DESCRIPTION - ORIENTATION: ORIENTATION: MID;LOWER

## 2023-01-31 NOTE — PROGRESS NOTES
Advanced Directives Consult: Pt was in bed as her son was visiting with her. She was dealing with atherosclerosis of native artery of left lower extremity with rest pain. She said that she was getting better and wanted prayer to cope and heal.     On the POA, she wanted her daughter to be present before anything is done about it. She wanted me to drop a copy and I did. 01/31/23 1136   Encounter Summary   Encounter Overview/Reason  Initial Encounter   Service Provided For: Patient and family together   Referral/Consult From: [facility]   Support System Children   Last Encounter  01/31/23   Complexity of Encounter Low   Begin Time 1125   End Time  1135   Total Time Calculated 10 min   Spiritual/Emotional needs   Type Spiritual Support   Advance Care Planning   Type ACP conversation   Assessment/Intervention/Outcome   Assessment Calm   Intervention Empowerment; Active listening   Outcome Encouraged; Acceptance

## 2023-01-31 NOTE — OP NOTE
Operative Note      Patient: Remigio Johnston  YOB: 1945  MRN: [id number]    Date of Procedure: 1/30/2023    Pre-Op Diagnosis:     Left rectus sheath hematoma  Left pelvic/femoral arterial bleeding  S/P Left Common and External Iliac stent and Left SFA-Popliteal stents 1/30/2023  Left foot ulcer  Severe PAD with ulcer  Right Breast Cancer  COPD on Oxygen  HTN  Anxiety disorder  Heavy tobacco use disorder  GERD  Atrial Fibrillation on Eliquis      Post-Op Diagnosis: Same, plus:    Left Superficial Femoral Circumflex artery bleeding, descending branch       Procedure:    USG guidance, Right Common Femoral artery  Right Common Femoral angiogram  Selective Left Hypogastric artery angiogram  Selective Left Superficial Femoral Circumflex artery angiogram  Coil embolization of Left Superficial Femoral Circumflex artery, descending branch, with 3 mm Vortx coils x2 and 5 mm Vortx coils x 2    Surgeon: Larissa Dodson. Nahomy Calix MD    Assistant:   None    Anesthesia:     Lidocaine local anesthesia only  Versed, Fentanyl: 0    Estimated Blood Loss (mL): less than 50     Complications: None    Specimens:   * No specimens in log *    Implants:  Implant Name Type Inv.  Item Serial No.  Lot No. LRB No. Used Action   STENT VASC L150MM DIA6MM CATH L130CM SHTH 6FR DRUG ELUT - [id number] Coronary stents STENT VASC L150MM DIA6MM CATH L130CM [facility] 6FR DRUG ELUT  [employer] [id number] Left 1 Implanted   STENT VASC L150MM DIA6MM CATH L130CM [facility] 6FR DRUG ELUT - [id number] Coronary stents STENT VASC L150MM DIA6MM CATH L130CM [facility] 6FR DRUG ELUT  [employer] [id number] Left 1 Implanted   STENT VASC L100MM DIA7MM CATH L130CM SHTH 6FR DRUG ELUT - [id number] Peripheral stents STENT VASC L100MM DIA7MM CATH L130CM SHTH 6FR DRUG ELUT  [employer] [id number] Left 1 Implanted   STENT VASC L120MM DIA6MM CATH L130CM SHTH 6FR DRUG ELUT - [id number] Peripheral stents STENT VASC L120MM DIA6MM CATH L130CM [facility] DRUG ELUT  [employer] [id number] Left 1 Implanted   STENT PERIPH L39MM [id number] CATH L135CM [facility] 7FR - [id number] Peripheral stents STENT PERIPH L39MM [id number] CATH L135CM INTRO [facility] 7FR [id number]  [employer]  Left 1 Implanted         Drains: Non    Findings:      No bleeding or extravasation from Left Common Iliac, External Iliac or Common Femoral arteries  Extravasation noted in Superficial Femoral Circumflex artery, descending branch    Operative Comments:    Heparin 2500 units  Versed 0  Fentanyl 0  Contrast 50 mL  Fluoroscopy Time: 19.7 min @2385 mGy    Detailed Description of Procedure: The patient was brought to the IR suite from the CT scan area. The CT scan revealed a rectus sheath hematoma with contrast in the hematoma sac, with the location of the bleeding unknown and not able to be localized on the CT. Given the patient's overall condition, and ongoing bleeding, her symptomatic state with firm abdomen and tenderness and relative hypotension, a decision was made to proceed with repeat left lower extremity angiogram via the right femoral access and possible intervention, and possible open surgery. This was discussed with the patient herself and her daughter over the phone in detail, and thus both agreed to proceed with emergent surgery and verbal informed consent was taken for this emergent surgery. The patient was taken to the IR suite and placed supine on the table. No IV sedation would be utilized in this case due to hypotension. The bilateral femoral areas were prepped and draped in the usual sterile fashion. Time out was called and verified. Detailed Description of Procedure: The patient was seen and positively identified in the preoperative holding area. Informed consent was verified, and all preoperative workup and imaging and laboratory values were reviewed. The procedure to be undertaken was reviewed and explained to the patient again.   All risks/possible complications, possible alternatives were discussed, as was the general intraoperative and postoperative course of this procedure. All questions were answered to the patient's satisfaction. The patient was then taken to the OR and placed supine on the operative table. Moderate sedation was commenced. The patient was then prepped and draped in the usual sterile fashion. Time out was called and verified. Ultrasound evaluation was used to evaluate the different access sites for the angiography to be performed. Ultrasound was used to visualize the right  common femoral artery, and duplex was used to demonstrate flow and vessel patent. Ultrasound was used to perform real-time imagery of needle advancement into the right  common femoral artery. U/S guided access was used to place a 5 [place] micropuncture catheter into the right common femoral artery. Angiography was performed which confirmed placement in the right common femoral artery. An 0.035 guide-wire was advanced into the micro puncture catheter, which was exchanged for a 5-[place] sheath. Subsequently a catheter was directed into the aorta and then the catheter was manipulated to the ostium of the left common iliac artery, and angiography was performed of the left lower extremity in multiple stations:      ANGIOGRAPHIC FINDINGS:     Left Common and External Iliac arteries patent with stents patent; no extravasation noted. Left  Common Femoral and Profunda femoris patent with no extravasation noted. Left Superficial Femoral artery was patent with stents patent and no extravasation noted. The patient was then carefully heparinized with only 2500 units of heparin and the ACT's were checked every 30 minutes. The heparin was given mostly for the large femoral sheath on the right common femoral artery. Then next, using a angled glide catheter, the left hypogastric artery was cannulated and a left hypogastric angiogram was taken.  It revealed no extravasation in the pelvic branches or the main hypogastric trunks. Next, the 0.035 catheter was placed at the distal end of the left external iliac artery stent. Angiogram revealed a small left superficial femoral circumflex artery, of which the descending branch has extravasation in the medial pelvic area at the level of the femoral head in the pelvis. This was thus the source of the bleeding. Thus, the 5F sheath was upsized to a 6F x 45 sheath which was parked at the mid-left external iliac artery. Once done, using a 0.018 catheter and 0.014 soft wire, the left superficial femoral circumflex artery was cannulated and the area of the bleeding was seen on this selective angiogram.  The wire was removed. Two 3 mm Vortx coils were placed in this descending branch distally, and this was followed by a 5 mm Vortx coils x 2 in the more proximal aspect of the descending branch. Completion angiogram revealed complete resolution of the extravasation and preservation of the ascending branch of the left superficial femoral circumflex artery. It also revealed complete patency of the left CFA, and SFA. Thus,  all wires and were removed. The 6F sheath was pulled and manual compression held. No protamine given or needed. Good seal and hemostasis noted. Dry sterile dressing applied, and the patient tolerated the procedure well. PLAN:  Bedrest x 6 hours  To ICU for close monitoring with ICU team  Transfuse blood as needed and correct all electrolytes. Abdominal binder in the am for the rectus sheath hematoma. Hold Plavix for now. N/V checks ongoing.     Electronically signed by Eve Nicole MD on 1/30/2023 at 9:05 PM

## 2023-01-31 NOTE — CARE COORDINATION
Case Management Assessment  Initial Evaluation    Date/Time of Evaluation: 1/31/2023 11:38 AM  Assessment Completed by: Coco Hill RN    If patient is discharged prior to next notation, then this note serves as note for discharge by case management. Patient Name: Bisi Sarmiento                   YOB: 1945  Diagnosis: Localized edema [R60.0]  Coronary artery disease involving native coronary artery of native heart without angina pectoris [I25.10]  PVD (peripheral vascular disease) with claudication ([address] [I73.9]  Athscl native arteries of left leg w ulceration oth prt foot ([address] [I70.245]  Atherosclerosis of native artery of both lower extremities with intermittent claudication ([address] [I70.213]  Atherosclerosis of native artery of left lower extremity with rest pain ([address] [I70.222]  Atherosclerosis of native artery of left lower extremity ([address] [I70.202]                   Date / Time: 1/30/2023  9:28 AM  Location: [address] [place] [address]     Patient Admission Status: Inpatient   Readmission Risk (Low < 19, Mod (19-27), High > 27): Readmission Risk Score: 11    Current PCP: Giselle Wilhelm. Jearldine Peabody, MD  PCP verified by CM? Yes    Chart Reviewed: Yes      History Provided by: Patient  Patient Orientation: Alert and Oriented    Patient Cognition: Alert    Hospitalization in the last 30 days (Readmission):  No    If yes, Readmission Assessment in CM Navigator will be completed.     Advance Directives:      Code Status: Full Code   Patient's Primary Decision Maker is: Legal Next of Kin (Spiritual Care consulted for assistance [facility] HCPOA paperwork)    Primary Decision Maker: Navarroreagan Miller Power Child - [phone]    Discharge Planning:    Patient lives with: Family Members, Children (with daughter and son-in-law) Type of Home: House  Primary Care Giver: Self  Patient Support Systems include: Children, Family Members   Current Financial resources: Medicare  Current community resources: None  Current services prior to admission: Oxygen Therapy, Other (Comment) (2L O2 during day, 4L O2 at HS thru [employer]; has nebs)            Current DME:              Type of Home Care services:  None    ADLS  Prior functional level: Assistance with the following:, Cooking, Housework  Current functional level: Assistance with the following:, [address], [address] [facility]    Family can provide assistance at DC: Yes  Would you like Case Management to discuss the discharge plan with any other family members/significant others, and if so, who? No  Plans to Return to Present Housing: Yes  Other Identified Issues/Barriers to RETURNING to current housing: none  Potential Assistance needed at discharge: N/A            Potential DME:    Patient expects to discharge to: [address] [place] [address] for transportation at discharge: Family    Financial    Payor: Ed Espinosa / Plan: Michael Frye / Product Type: *No Product type* /     Does insurance require precert for SNF: Yes    Potential assistance Purchasing Medications: No  Meds-to-Beds request: Yes      [address] [place] [address] - [place], [address]  [address]  [address]  Phone: [phone] Fax: [phone]      Notes:    Factors facilitating achievement of predicted outcomes: Family support, Cooperative, and Pleasant    Barriers to discharge: Medical complications and Medication managment    Additional Case Management Notes: Direct admit from  following elective angiogram. C/o abdominal pain in PACU, drop in SBP to 90. CT revealed left rectus sheath hematoma. Returned to IR for repeat angiogram with Dr. Alex Uribe and received coil embolization - see notes below. Hypotensive and bradycardia in PACU and during procedure so went to ICU. Received 2.5L fld in procedure. Received 1 PRBC and 1 FFP yesterday, and 1 PRBC and 1 FFP today. Emesis x1 after arrival to ICU and lucy to 40's w/emesis; resolved when stopped vomiting. Started on levophed drip.  Started on clear liquid diet. Afebrile. Afib 80's. Sats 100% on 2L O2. Ox4. Follows commands. RLE warm with brisk cap refill and doppler PT and DP pulses. LLE warm with brisk cap refill and doppler DP pulse, CHRISTIANO PT pulse. Right femoral puncture site with hematoma. SCDs. IVF, levo @ 7 mcg/min, plavix, nebs, singulair, Electrolyte replacement protocols. Received 500 ml fld bolus x1 yesterday. K+ 3.8 - 3.2 - now 5.3, CO2 30 - now 19, Ical 0.9- now 1.27, Mg 1 - now 3, hgb 13 - down to 7.7 - now 9.7, INR 1.33. Procedure:   1/30 Angiogram with Stent to left SFA-popliteal arteries, stent to left common iliac artery, and stent to left external iliac artery  1/30 CTA Abd/pelvis: Left-sided rectus sheath hematoma with an area of active arterial extravasation. The origin of this extravasation cannot be definitively visualized  1/30 Return to OR: Angiogram; left rectus sheath hematoma; coil embolization of left superficial femoral circumflex artery, descending branch, with 3mm Vortx coils x2 and 5 mm Vortx coils x2. The Plan for Transition of Care is related to the following treatment goals of Localized edema [R60.0]  Coronary artery disease involving native coronary artery of native heart without angina pectoris [I25.10]  PVD (peripheral vascular disease) with claudication (HCC) [I73.9]  Athscl native arteries of left leg w ulceration oth prt foot (HCC) [I70.245]  Atherosclerosis of native artery of both lower extremities with intermittent claudication ([address] [I70.213]  Atherosclerosis of native artery of left lower extremity with rest pain (HCC) [I70.222]  Atherosclerosis of native artery of left lower extremity ([address] [I70.202]    Patient Goals/Plan/Treatment Preferences: Home with daughter and son-in-law. Denies needs, declines HH. Wears 2L O2 during day, 4L O2 at HS and has nebs thru [employer] [place]. Transportation/Food Security/Housekeeping Addressed: No issues identified.      Washington Jara RN  Case Management Department

## 2023-01-31 NOTE — CONSULTS
Critical Care - Consult Note        Patient:  Berny Jackson  YOB: 1945  Date of Service: 1/31/2023  MRN: [id number]   Acct:  [de-identified]   Primary Care Physician: Eduarda Alcala. Ezequiel Morales MD    Chief Complaint:  Rectus sheath hematoma    Reason for consult  Hypotension, bradycardia post rectus sheath hematoma    Date of Service: Pt seen/examined in consultation on 1/31/2023     History Of Present Illness:      Berny Jackson a 69 y.o. female who we are asked to see/evaluate by Dr. Sabina Pride for medical management of hypotension, bradycardia status post rectus sheath hematoma repair. Travis Crowe presented to the hospital today for an elective outpatient left lower extremity angiogram with possible intervention with .  Medical history includes surgically resected right sided breast cancer status post radiation therapy, COPD with 2 L/min continuously and 4 L/min at night. Hypertension, anxiety, prior tobacco user, gastroesophageal reflux disease, atrial fibrillation on apixaban. Had a history of left foot ulcer and peripheral arterial disease and was initially seen by podiatry and then referred for possible intervention to vascular surgery. Today she had aortogram, stents to her left superficial femoral artery and popliteal artery, left common iliac artery, left external iliac artery. Patient tolerated the procedure well was taken back to PACU area. There she started complaining of abdominal pain noted to be tender and firm on the left side and had a drop in her blood pressure to 90 systolic. She was taken for a emergent CT scan which showed a left rectus sheath hematoma with some contrast in the lower pelvis. She was therefore taken emergently back to interventional radiology with Dr. Sabina Pride where he reaccessed the right femoral artery, found the source of bleeding in the circumflex artery, placed 3 coils in this with resolution of bleeding.   Was noted to be hypotensive and bradycardic both in PACU and during the procedure, received a total of 2.5 L of crystalloid and arrived in the ICU with 1 unit of PRBC running. Patient states she would like to avoid further blood transfusions as the blood she might receive might be from somebody who had received a vaccine against COVID-19. She is worried about receiving blood from somebody who had gotten a vaccine against COVID-19. She is agreeable to additional blood transfusions if necessary. On arrival to the ICU patient complained of nausea and had one episode of emesis, this improved after given ondansetron. During the episode of emesis she bradycardia down to the 40s.,  This resolved when she stopped vomiting. Assessment and Plan:-  Hemorrhagic shock  Secondary to #2  Hgb 9 from 13 this am  INR elevated, TEG shows markedly prolonged R time  1 PRBC, 2 FFP, than recheck TEG and INR  NPO in case additional procedures needed.   Hold anticoagulation  Left rectus sheath hematoma  Status post coiling to left medial circumflex artery  Patient denies need for analgesia  Peripheral arterial disease  S/P stents to left superficial femoral artery, left popliteal artery, left common iliac artery, left external iliac artery  Transient Bradycardia  Likely vasovagal from vomitting  ECG shows atrial fibrillation vs Mobitz II heart block  Check electrolytes and replace as needed  Contine telemetry  Hypocalcemia  Ionized calcium 0.90, will replace with 4 g calcium gluconate  Hypomagnesemia  Replace per protocol  Hypokalemia  Replace per protocol  Hypertension  Hold antihypertensives in setting of hypotension  Chronic obstructive pulmonary disease  Continue home oxygen 2 lpm day, 4 lpm at night  Resume home budesonide, montelukast, butyryl      Past Medical History:        Diagnosis Date    Abnormal weight gain     Adjustment disorder     Anemia     Anxiety     Breast cancer ([facility] 2018    Cancer ([facility] 2018    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-    OTHER SURGICAL HISTORY      cubital tunnel release left elbow-    OTHER SURGICAL HISTORY  2000    Nu removal-    OTHER SURGICAL HISTORY  05/09/2018    excision back/lipoma    AR MASTECTOMY PARTIAL Right 3/15/2018    RIGHT BREAST LUMPECTOMY, SENTINAL LYMPH NODE BIOPSY performed by Vivian Srivastava MD at [facility] [address] 5/9/2018    EXCISION BACK LIPOMA performed by Vivian Srivastava MD at [facility] 8 LEFT Left 2019     BREAST BIOPSY W LOC DEVICE 1ST LESION RIGHT Right 2019       Home Medications:   No current facility-administered medications on file prior to encounter.      Current Outpatient Medications on File Prior to Encounter   Medication Sig Dispense Refill    Neomycin-Bacitracin-Polymyxin (HCA TRIPLE ANTIBIOTIC OINTMENT EX) Apply topically as needed      azithromycin (ZITHROMAX) 250 MG tablet Take 250 mg by mouth daily      potassium chloride (KLOR-CON) 20 MEQ packet Take 20 mEq by mouth 2 times daily      ELIQUIS 5 MG TABS tablet Take 5 mg by mouth 2 times daily      Zinc Acetate, Oral, (ZINC ACETATE PO) Take by mouth      Ascorbic Acid (VITAMIN C) 250 MG tablet Take 250 mg by mouth daily      NONFORMULARY Neuroflow plus      Biotin 2500 MCG CAPS Take by mouth      Hydroxyurea (HYDREA PO) Take by mouth daily      Atorvastatin Calcium (LIPITOR PO) Take by mouth daily      guaiFENesin (MUCINEX) 600 MG extended release tablet Take 1,200 mg by mouth 2 times daily      nystatin (MYCOSTATIN) 736905 UNIT/ML suspension Take 500,000 Units by mouth 4 times daily      Multiple Vitamins-Minerals (CENTRUM SILVER PO) Take 1 tablet by mouth daily      vitamin D (CHOLECALCIFEROL) 1000 UNIT TABS tablet Take 1,000 Units by mouth daily      budesonide (PULMICORT) 0.5 MG/2ML nebulizer suspension Take 1 ampule by nebulization 2 times daily      verapamil (VERELAN) 240 MG extended release capsule Take 240 mg by mouth nightly      montelukast (SINGULAIR) 10 MG tablet Take 10 mg by mouth nightly      ALPRAZolam (XANAX) 0.5 MG tablet Take 0.5 mg by mouth 3 times daily as needed for Sleep.      benzonatate (TESSALON) 100 MG capsule Take 100 mg by mouth 3 times daily as needed for Cough      albuterol sulfate  (90 Base) MCG/ACT inhaler Inhale 2 puffs into the lungs every 4 hours as needed for Wheezing      sennosides-docusate sodium (SENOKOT-S) 8.6-50 MG tablet Take 1 tablet by mouth daily as needed for Constipation      NONFORMULARY Eye drops as needed FOR DRY EYE      fluticasone (FLONASE) 50 MCG/ACT nasal spray 1 spray by Nasal route daily as needed for Rhinitis      gabapentin (NEURONTIN) 100 MG capsule Take 100 mg by mouth 3 times daily. Allergies:    Brethine [terbutaline], Norco [hydrocodone-acetaminophen], and Keflex [cephalexin]    Social History:    reports that she quit smoking about 18 months ago. Her smoking use included cigarettes. She smoked an average of 2 packs per day. She has never used smokeless tobacco. She reports current drug use. Frequency: 2.00 times per week. Drug: Marijuana Heena Ege). She reports that she does not drink alcohol.     Family History:       Problem Relation Age of Onset    Cancer Mother         lung    Breast Cancer Mother 28        age 28-36    Other Father         leukemia    Other Sister         breast fibrosis    Breast Cancer Sister 39    Liver Disease Sister     Other Brother         suicide    Diabetes Maternal Grandmother        Diet:  Diet NPO Exceptions are: Sips of Water with Meds    Review of systems:   Pertinent positives as noted in the HPI. All other systems reviewed and negative.     PHYSICAL EXAMINATION:  Patient Vitals for the past 8 hrs:   BP Temp Temp src Pulse Resp SpO2   01/31/23 0015 -- -- -- 73 20 98 %   01/31/23 0000 -- 97.4 °F (36.3 °C) Oral (!) 45 12 100 %   01/30/23 2356 (!) 117/32 97.5 °F (36.4 °C) -- 59 19 100 %   01/30/23 2345 (!) 117/32 -- -- 57 18 100 %   01/30/23 2330 111/72 -- -- 63 21 96 %   01/30/23 2315 (!) 96/37 -- -- 57 18 99 %   01/30/23 2305 (!) 103/35 97.9 °F (36.6 °C) Oral -- -- --   01/30/23 2300 -- -- -- 69 17 99 %   01/30/23 2245 (!) 103/35 -- -- 66 20 100 %   01/30/23 2230 (!) 108/41 -- -- 71 19 99 %   01/30/23 2215 (!) 109/25 -- -- 52 20 100 %   01/30/23 2200 (!) 104/45 -- -- 54 16 100 %   01/30/23 2152 (!) 117/39 97.3 °F (36.3 °C) -- 58 20 100 %   01/30/23 2145 (!) 105/40 -- -- 53 19 100 %   01/30/23 2130 (!) 100/41 -- -- 59 20 100 %   01/30/23 2120 (!) 109/41 -- -- 56 14 100 %   01/30/23 2115 -- -- -- 59 17 100 %   01/30/23 2100 -- -- -- 53 17 98 %   01/30/23 2045 (!) 84/51 -- -- 67 17 97 %   01/30/23 2030 110/79 -- -- 60 18 99 %   01/30/23 2015 (!) 133/39 -- -- 68 19 97 %   01/30/23 2000 -- 97.2 °F (36.2 °C) Rectal 62 18 97 %   01/1945 (!) 95/46 97.9 °F (36.6 °C) -- 55 16 97 %   01/30/23 1934 (!) 115/47 -- -- 63 15 97 %   01/30/23 1929 (!) 114/49 -- -- 58 13 99 %   01/30/23 1924 (!) 114/44 -- -- 58 13 99 %   01/30/23 1919 (!) 103/44 -- -- 51 11 100 %   01/30/23 1914 (!) 99/45 -- -- 55 11 99 %   01/30/23 1913 -- 97.7 °F (36.5 °C) -- -- -- --   01/30/23 1909 107/62 -- Oral 53 11 97 %   01/30/23 1907 101/60 -- -- -- -- --   01/30/23 1906 (!) 109/45 -- -- -- -- --   01/30/23 1904 (!) 109/43 -- -- (!) 49 12 100 %   01/30/23 1902 (!) 99/46 -- -- 53 11 100 %   01/30/23 1901 (!) 104/45 -- -- (!) 49 14 100 %   01/30/23 1859 (!) 102/41 -- -- 51 11 100 %   01/30/23 1858 (!) 99/46 -- -- 50 12 100 %   01/30/23 1856 (!) 115/46 -- -- 52 13 100 %   01/30/23 1852 82/61 -- -- 51 17 100 %   01/30/23 1848 (!) 81/50 -- -- 53 12 100 %   01/30/23 1843 (!) 95/45 -- -- (!) 49 11 98 %   01/30/23 1837 (!) 97/39 -- -- (!) 47 11 100 %   01/30/23 1832 (!) 107/46 -- -- (!) 49 13 100 %   01/30/23 1828 89/60 -- -- 54 13 100 %   01/30/23 1823 (!) 107/54 -- -- 52 11 100 %   01/30/23 1818 (!) 97/44 -- -- 51 14 100 %   01/30/23 1813 (!) 95/46 -- -- 54 12 100 %   01/30/23 1807 (!) 106/49 -- -- 53 13 100 %   01/30/23 1803 (!) 105/43 -- -- 52 19 100 %   01/30/23 1758 (!) 102/49 -- -- (!) 49 12 100 %   01/30/23 1753 (!) 107/47 -- -- 51 11 100 %   01/30/23 1747 136/67 -- -- 52 22 100 %   01/30/23 1657 (!) 98/30 -- -- (!) 36 15 97 %   01/30/23 1654 (!) 94/39 -- -- (!) 34 16 99 %     No intake/output data recorded. Wt Readings from Last 3 Encounters:   01/30/23 124 lb (56.2 kg)   01/16/23 125 lb 3.2 oz (56.8 kg)   09/12/22 121 lb (54.9 kg)      Body mass index is 22.68 kg/m². CAM-ICU:   0    Physical Exam  Constitutional:       General: She is not in acute distress. Appearance: Normal appearance. She is normal weight. She is not ill-appearing, toxic-appearing or diaphoretic. Interventions: Nasal cannula in place. HENT:      Head: Normocephalic and atraumatic. Nose: Nose normal.      Mouth/Throat:      Mouth: Mucous membranes are moist.      Pharynx: Oropharynx is clear. Eyes:      Conjunctiva/sclera: Conjunctivae normal.   Cardiovascular:      Rate and Rhythm: Normal rate and regular rhythm. Pulses: Normal pulses. Heart sounds: Normal heart sounds. Pulmonary:      Effort: Pulmonary effort is normal.      Breath sounds: Normal breath sounds. Abdominal:      General: Bowel sounds are normal.      Palpations: Abdomen is soft. Tenderness: There is abdominal tenderness in the suprapubic area. Musculoskeletal:      Cervical back: Normal range of motion. Skin:     General: Skin is warm and dry.       Capillary Refill: Capillary refill takes less than 2 seconds. Neurological:      Mental Status: She is alert and oriented to person, place, and time. Data: (All radiographs, tracings, PFTs, and imaging are personally viewed and interpreted unless otherwise noted).        CURRENT PARENTERAL VASOACTIVE / INOTROPIC AGENTS:  [] None Vasopressors:  [] Norepinephrine  [] Dopamine  [] Phenylephrine  [] Vasopressin  [] Epinephrine  [] Other: Sedation:  [] No Sedation  [] Propofol gtt-    [] BZD gtt -    [] Opiate gtt  -    [] Dexmedetomidine  [] Paralytics Antihypertensives gtt  [] Ca Channel Antagonist:  [] Beta-blocker:  [] Nitroglycerin  [] Nitroprusside     SUPPORT DEVICES:  [] ETT   MODE:-  []PRVC           []CPAP             []BILEVEL-PAP    Additional Respiratory Assessments  Heart Rate: 73  Resp: 20  SpO2: 98 %  Oxygen Delivery - O2 Flow Rate (L/min): 2 L/min  ABGs:   No results found for: PH, PCO2, PO2, HCO3, O2SAT  No results found for: IFIO2, MODE, SETTIDVOL, SETPEEP    NUTRITION:  [] Gastric Tube [] OG / [] NG  [] TUBE FEEDS  [] TPN    CENTRAL LINES/CHEMOPORT/TUNNEL CATH:-    [] No   [] Yes   (Date )           If yes -    [] Right IJ   [] Left IJ [] Right Femoral [] Left Femoral     [] Right Subclavian [] Left Subclavian  [x] Peripheral IV access  [] Arterial Line (Specify Site)    MCCLENDON CATHETER:-   [x] No  [] Yes  (Date  )     ICU PROPHYLAXIS/THERAPY:   Stress ulcer:  [] PPI Agent  [] H2RA [] Sucralfate [] Other:   VTE:     [] Enoxaparin    [] Warfarin [] NOAC [x] PCD Device:Bilat LE   [] Heparin: [] Subcut / [] IV     LABS/RADIOLOGY:-  Recent Labs     01/30/23  0959 01/30/23  1805 01/30/23  2119   WBC 3.7*  --  8.8   HGB 13.0 9.0* 9.5*   HCT 39.1 27.1* 30.1*     --  193     Recent Labs     01/30/23  1000 01/30/23  2119    145   K 3.8 3.2*    118*   CO2 30 18*   BUN 18 12   CREATININE 0.7 0.4   CALCIUM 9.1 5.8*     No results for input(s): AST, ALT, BILIDIR, BILITOT, ALKPHOS in the last 72 hours. Recent Labs     01/30/23  1000 01/30/23  2119   INR 1.04 1.33*     No results for input(s): Hazeline Balk in the last 72 hours. No results for input(s): PROCAL in the last 72 hours. No results for input(s): LACTA in the last 72 hours. Microbiology:    Blood culture #1: No results found for: BC  Blood culture #2:No results found for: Jose Saad  Organism:No results found for: ORG    No results found for: LABGRAM  MRSA culture only:No results found for: [address]  Urine culture: No results found for: LABURIN  Respiratory culture: No results found for: CULTRESP  Aerobic and Anaerobic :  No results found for: LABAERO  No results found for: LABANAE    Urinalysis:    No results found for: NITRU, WBCUA, BACTERIA, RBCUA, BLOODU, SPECGRAV, GLUCOSEU    Radiology:(All radiographs, tracings, PFTs, and imaging are personally viewed and interpreted unless otherwise noted). IR ABDOMINAL AORTOBIFEMORAL CATHETER SERIALOGRAM   Final Result      CTA ABDOMEN PELVIS W WO CONTRAST   Final Result   Left-sided rectus sheath hematoma with an area of active arterial extravasation. The origin of this extravasation cannot be definitively visualized. Case was discussed in person by Dr. Israel Phelps with Dr. Endy Brooks at approximately 5:40 PM 1/30/2023 in the CT control room. **This report has been created using voice recognition software. It may contain minor errors which are inherent in voice recognition technology. **      Final report electronically signed by Dr Rubén Burk on 1/30/2023 6:03 PM      IR ANGIOGRAM EXTREMITY LEFT   Final Result            CONSULTS:-  Vascular Surgery - Dr. Tayla Eaton STATUS:-Full Code    Meets Continued ICU Level Care Criteria:    [x] Yes   [] No - Transfer Planned to listed location:  [] HOSPITALIST CONTACTED-  (Name)    Patient and plan of care discussed with Dr. Sydnee Vazquez    Electronically signed by Raudel Tellez MD on 1/31/2023 at 12:52 AM  PGY-2 Emergency Medicine  Critical Care Service      Patient seen by me including key components of medical care on 1/31/23. Case discussed with Dr. Elidia Marino.  Case discussed with resident physician. Transfuse as necessary. Electronically signed by Humberto Winchester MD.

## 2023-01-31 NOTE — PLAN OF CARE
Problem: Discharge Planning  Goal: Discharge to home or other facility with appropriate resources  1/30/2023 2330 by Dian De La Fuente RN  Outcome: Progressing  1/30/2023 1355 by Neena Cox RN  Outcome: Progressing  Flowsheets (Taken 1/30/2023 1040)  Discharge to home or other facility with appropriate resources:   Identify barriers to discharge with patient and caregiver   Arrange for needed discharge resources and transportation as appropriate   Identify discharge learning needs (meds, wound care, etc)     Problem: Neurosensory - Adult  Goal: Achieves maximal functionality and self care  1/30/2023 2330 by Dian De La Fuente RN  Outcome: Progressing  1/30/2023 1355 by Neena Cox RN  Outcome: Progressing     Problem: Cardiovascular - Adult  Goal: Maintains optimal cardiac output and hemodynamic stability  1/30/2023 2330 by Dian De La Fuente RN  Outcome: Progressing  1/30/2023 1355 by Neena Cox RN  Outcome: Progressing     Problem: Skin/Tissue Integrity - Adult  Goal: Incisions, wounds, or drain sites healing without S/S of infection  1/30/2023 2330 by Dian De La Fuente RN  Outcome: Progressing  1/30/2023 1355 by Neena Cox RN  Outcome: Progressing     Problem: Musculoskeletal - Adult  Goal: Return mobility to safest level of function  1/30/2023 2330 by Dian De La Fuente RN  Outcome: Progressing  1/30/2023 1355 by Neena Cox RN  Outcome: Progressing  Goal: Return ADL status to a safe level of function  1/30/2023 1355 by Neena Cox RN  Outcome: Progressing     Problem: Gastrointestinal - Adult  Goal: Minimal or absence of nausea and vomiting  1/30/2023 2330 by Dian De La Fuente RN  Outcome: Progressing  1/30/2023 1355 by Neena Cox RN  Outcome: Progressing     Problem: Infection - Adult  Goal: Absence of infection at discharge  1/30/2023 2330 by Dian De La Fuente RN  Outcome: Progressing  1/30/2023 1355 by Neena Cox RN  Outcome: Progressing     Problem: Genitourinary - Adult  Goal: Absence of urinary retention  1/30/2023 2330 by Dian De La Fuente RN  Outcome: Progressing  1/30/2023 1355 by Neena Cox RN  Outcome: Progressing     Problem: Pain  Goal: Verbalizes/displays adequate comfort level or baseline comfort level  Outcome: Progressing

## 2023-01-31 NOTE — PROGRESS NOTES
CRITICAL CARE PROGRESS NOTE    Patient:  Anabell Cedeno    Unit/Bed:4D-16/016-A  YOB: 1945  MRN: [id number]   PCP: Kat Zepeda. Ruma Loo MD  Date of Admission: 1/30/2023  Chief Complaint:- Post-procedural bleed    Assessment and Plan:    Hemorrhagic Shock: Etiology is left rectus sheath hematoma on 01/30 after peripheral interventions to left lower extremity. S/p coiling to left superficial femoral circumflex artery with 3 mm Vortx coils x2 and 5 mm Vortx coils x 2. 2u FFP, 3u pRBC to stabilize Hgb ~9.0. Weaned off levophed. PAD with Claudication: S/p stents to left SFA, left popliteal, left common and external iliacs by Ene Okeefe MD on 01/30. Continue Plavix. Home Eliquis held at this time. Chronic Hypoxic Respiratory Failure: Continue baseline 2.0 L/min on NC  Asymptomatic Bradycardia: Vasovagal likely during emesis x1 01/30. Electrolytes repleted. Acute Blood Loss Anemia: See above. COPD: Resume home budesonide BID, montelukast 10 mg nightly, butyryl  Essential Hypertension: Takes 250 mg Verapamil daily which is not ordered at this time due to shock. Hypocalcemia: Repleted. Hyperkalemia: Repleted. 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 Geofm Favre 2019. Follows with Dr. Melanie Jonas. Continue annual mammograms  GERD: Continue home PPI  Anxiety: Takes xanax 0.5 mg TID  Former Tobacco Smoker: 55 pack year smoking hx     INITIAL H AND P AND ICU COURSE:  \"Jennifer Zimmermanmble a 69 y.o. female who we are asked to see/evaluate by Dr. Chloe Knowles for medical management of hypotension, bradycardia status post rectus sheath hematoma repair. Phuong Jiang presented to the hospital today for an elective outpatient left lower extremity angiogram with possible intervention with .  Medical history includes surgically resected right sided breast cancer status post radiation therapy, COPD with 2 L/min continuously and 4 L/min at night. Hypertension, anxiety, prior tobacco user, gastroesophageal reflux disease, atrial fibrillation on apixaban. Had a history of left foot ulcer and peripheral arterial disease and was initially seen by podiatry and then referred for possible intervention to vascular surgery. Today she had aortogram, stents to her left superficial femoral artery and popliteal artery, left common iliac artery, left external iliac artery. Patient tolerated the procedure well was taken back to PACU area. There she started complaining of abdominal pain noted to be tender and firm on the left side and had a drop in her blood pressure to 90 systolic. She was taken for a emergent CT scan which showed a left rectus sheath hematoma with some contrast in the lower pelvis. She was therefore taken emergently back to interventional radiology with Dr. Sunita Bennett where he reaccessed the right femoral artery, found the source of bleeding in the circumflex artery, placed 3 coils in this with resolution of bleeding. Was noted to be hypotensive and bradycardic both in PACU and during the procedure, received a total of 2.5 L of crystalloid and arrived in the ICU with 1 unit of PRBC running. Patient states she would like to avoid further blood transfusions as the blood she might receive might be from somebody who had received a vaccine against COVID-19. She is worried about receiving blood from somebody who had gotten a vaccine against COVID-19. She is agreeable to additional blood transfusions if necessary. On arrival to the ICU patient complained of nausea and had one episode of emesis, this improved after given ondansetron. During the episode of emesis she bradycardia down to the 40s.,  This resolved when she stopped vomiting. \"    Interval Hx (01/31/2023): Patient still needing transfusions for hemorrhagic shock. Total 3u pRBC and 2u FFP. Hgb with a slow downtrend, stable enough for step down if we can keep her off levophed.  Systolic stays well above 716 mmHg but diastolic runs low. Past Medical History: Chronic respiratory failure, COPD, hypertension, history of breast cancer, GERD, anxiety, former tobacco smoker. Family History: N/A. Social History: Former tobacco smoker. ROS   As stated in HPI    Scheduled Meds:   clopidogrel  75 mg Oral Daily    sodium chloride flush  5-40 mL IntraVENous 2 times per day    sodium chloride flush  5-40 mL IntraVENous 2 times per day    ondansetron  4 mg IntraVENous Once    calcium replacement protocol   Other RX Placeholder    budesonide  500 mcg Nebulization BID    montelukast  10 mg Oral Nightly     Continuous Infusions:   norepinephrine      sodium chloride Stopped (01/30/23 1056)    sodium chloride Stopped (01/30/23 1645)    sodium chloride       PHYSICAL EXAMINATION:  T: 99.8. P: 70. RR: 14. B/P: 119/37-mmHg . O2 Sat: 97%. I/O: +4.6 L  Body mass index is 22.68 kg/m². GCS:  15  General:  Chronically ill appearing female in bed NAD on 2.0 L/min NC  HEENT:  normocephalic and atraumatic. No scleral icterus. PERR  Neck: supple. No Thyromegaly. Lungs: clear to auscultation. No retractions  Cardiac: RRR. No JVD. Abdomen: soft. Nontender, thin and petite   Extremities:  No clubbing, cyanosis, or edema x 4. Vasculature: capillary refill < 3 seconds. Palpable dorsalis pedis pulses. Skin:  warm and dry, pale  Psych:  Alert and oriented x3. Affect appropriate  Lymph:  No supraclavicular adenopathy. Neurologic:  No focal deficit. No seizures. Data: (All radiographs, tracings, PFTs, and imaging are personally viewed and interpreted unless otherwise noted).    Sodium 141, potassium 5.3, chloride 107, CO2 19, BUN 19, creatinine 1.0, anion gap 15, serum calcium 9.2, ionized calcium 1.27, GFR 58, magnesium 3  WBC 8.5, Hgb 7.7, 9.7, 8.5, .1, platelets 292  Telemetry shows NSR    Seen with multidisciplinary ICU team  Meets Continued ICU Level Care Criteria:    [x] Yes   [] No - Transfer Planned to listed location:  [] HOSPITALIST CONTACTED-      Case and plan discussed with Dr. Neena Marrero. Electronically signed by DO Rm Melo   Patient seen by me including key components of medical care. Case discussed with resident physician. Agree with intervention as detailed above. .  Italicized font, if present,  represents changes to the note made by me. CC time 35 minutes. Time was discontiguous. Time does not include procedure. Time does include my direct assessment of the patient and coordination of care. Time represents more than 50% of the time involved with patient care by the [address] [place] [address] team.  Electronically signed by Anabell Oconnell.  Neena Marrero MD.

## 2023-01-31 NOTE — PLAN OF CARE
Problem: Respiratory - Adult  Goal: Achieves optimal ventilation and oxygenation  Outcome: Progressing   Patient mutually agrees upon goal.

## 2023-01-31 NOTE — PROGRESS NOTES
CT/CV Surgery Progress Note    1/30/2023 7:57 PM    After arrival into the PACU area, the patient had firmness and tenderness to the left side of the abdomen and also suddenly had a drop in her BP to 27'V systolic. No hematoma from the right femoral access site, and no tenderness in the right side of the abdomen. Both feet warm and perfused and with dopplerable pulses. Fluids given and bolused, which stabilized the BP, and the patient taken to the CT scan for CT angiogram.    CT angiogram revealed a left rectus sheath hematoma with some contrast noted in the lower pelvis and unclear the source of the bleeding as there was no extravasation of the contrast from the left common or external iliac or common femoral artery. Distal perfusion intact. Plan:  Given the patient's current status, symptoms and the findings on the CT angiogram, the most appropriate treatment is to return to the IR for left leg angiogram and possible further intervention, and possible open surgical intervention. Type and screen and H/Hct started. Patient desires blood transfusions as needed. Discussion made with patient and phone call given to daughter, who all agreed to proceed emergently. Once treated, will proceed to ICU and case discussed with ICU staff.     Maryann Allison MD

## 2023-01-31 NOTE — OP NOTE
Operative Note      Patient: Anabell Cedeno  YOB: 1945  MRN: [id number]    Date of Procedure: 1/30/2023    Pre-Op Diagnosis:     Left foot ulcer  Severe PAD with ulcer  Right Breast Cancer  COPD on Oxygen  HTN  Anxiety disorder  Heavy tobacco use disorder  GERD  Atrial Fibrillation on Eliquis    Post-Op Diagnosis: Same, plus    Occlusion Left External Iliac artery  80% stenosis Left Common Iliac artery  Occlusion Left SFA artery  70% stenosis Left above knee Popliteal artery       Procedure:    USG guidance, Right Common Femoral artery  Right Common Femoral angiogram  Non-selective Aortogram  Selective Left Popliteal angiogram  USG guidance, Left Posterior Tibial artery  Left  Posterior Tibial angiogram  Stent to Left SFA-Popliteal arteries with 6 x 120, 6 x 150, and 6 x 150 Alfred Emi stents  IVUS Left Common Iliac artery  IVUS Left External Iliac artery  Stent to Left Common Iliac artery with 7 mm x 39 [place] VBX stent  Stent to Left External Iliac artery with 7 mm x 100 Alfred Emi stent  Moderate sedation x 150 minutes    Surgeon:  Ila Castillo. Chloe Knowles MD    Assistant:   None    Anesthesia:   Versed 3.5 mg  Fentanyl 175 mcg    Operative Comments:  Heparin 7600 units  Protamine 20 mg  Contrast 150 mL  Zofran 4 mg IV    Fluoroscopy Time 42.1 min @408 mGy    Estimated Blood Loss (mL): Minimal    Complications: None    Specimens:   * No specimens in log *    Implants:  Implant Name Type Inv. Item Serial No.  Lot No. LRB No. Used Action   COIL VASC PUSHABLE 3MM DIAX3. 3MM ANABELLA-18 FBR PLAT VORTX - [id number] Vascular coils COIL VASC PUSHABLE 3MM DIAX3. 3MM ANABELLA-18 FBR PLAT VORTX  [employer]  Left 2 Implanted         Drains: * No LDAs found *    Findings:     Occlusion Left External Iliac artery  80% stenosis Left Common Iliac artery  Occlusion Left SFA artery  70% stenosis Left above knee Popliteal artery    Detailed Description of Procedure:     Detailed Description of Procedure: The patient was seen and positively identified in the preoperative holding area. Informed consent was verified, and all preoperative workup and imaging and laboratory values were reviewed. The procedure to be undertaken was reviewed and explained to the patient again. All risks/possible complications, possible alternatives were discussed, as was the general intraoperative and postoperative course of this procedure. All questions were answered to the patient's satisfaction. The patient was then taken to the OR and placed supine on the operative table. Moderate sedation was commenced. The patient was then prepped and draped in the usual sterile fashion in both femoral areas and left foot. Time out was called and verified. Moderate sedation given for 150 minutes. Ultrasound evaluation was used to evaluate the different access sites for the angiography to be performed. Ultrasound was used to visualize the right  common femoral artery, and duplex was used to demonstrate flow and vessel patent. However, the vessel was diseased and the vessels were small , so thus a closure device was not indicated. Ultrasound was used to perform real-time imagery of needle advancement into the right  common femoral artery. U/S guided access was used to place a 5 [place] micropuncture catheter into the right common femoral artery. Angiography was performed which confirmed placement in the right common femoral artery. An 0.035 guide-wire was advanced into the micro puncture catheter, which was exchanged for a 5-[place] sheath. Subsequently a catheter was directed into the aorta to the level of the renal arteries, and an angiography of the aorta was performed. The catheter was manipulated down to the level of the aortic bifurcation, and an angiogram was performed of the hypogastric and iliac vessels and the left lower extremity.        ANGIOGRAPHIC FINDINGS:      Aorta widely patent   Right Common and External Iliac arteries patent but heavy plaque noted. Left  Common Femoral and Profunda femoris patent   Left Superficial Femoral artery was flush occluded and reconstituted at the adductor canal  Left Popliteal artery was patent but 70% stenosis at the above knee segment to just above the knee joint. Left TP trunk was patent  Left Posterior Tibial artery was patent  Left  Anterior Tibial was patent  Left Peroneal artery patent until distal calf     Thus, given the above findings, a decision was made to intervene. The patient was systemically heparinized and ACT's were checked every 30 minutes to keep the ACT greater than 250 seconds. Then next, the glidewire and 0.035 catheter was used to cross the iliac lesions and the catheter placed into the left common femoral artery. Then next, the 5F sheath was exchanged to a 7F x 45 sheath which was parked at the left common femoral artery. With the 0.035 catheter and wire, attempts were made to cross the flush SFA occlusion, but it was not successful. Thus, using USG guidance, the left posterior tibial artery was seen and cannulated with a microwire and then a microsheath. The selective posterior tibial angiogram revealed true lumen passage. Then next, a 5F sheath was placed. Using a 0.035 catheter and wire, the left SFA and Popliteal lesions were crossed from the pedal sheath retrograde. The catheter was placed at the left external iliac distally and angiogram revealed true lumen passage. The entire SFA/Popliteal arteries were pre-dilated with a 4 mm balloon. Once done, then from the femoral sheath, the left SFA and Popliteal arteries were now crossed, and once done, [facility] stents were selected, and the stents were placed distally to proximally with a 6 mm x 150, then in the mid SFA with another 6 mm x 150, and then at the ostium, a 6 mm x 120. Care was taken not to snowplow to cover the Profunda femoris.   Once done, all stents were post-dilated with a 6 mm balloons. Then next, the sheath was withdrawn to the left common iliac ostium. Angiograms confirmed again the common and external iliac lesions. The 0.035 wire was exchanged to a 0.018 V-18 wire, and then over this wire, an 0.018 IVUS catheter was placed and IVUS was performed at the left GERRY/EIA. IVUS FINDINGS:    Left GERRY native ostium 7.5 mm  Left EIA disally 5 mm    Thus, a 7 mm x 100 [place] Emi stent was placed from the distal left GERRY to the distal left EIA. Above, and with good overlap, a 7 mm x 39 [place] VBX balloon expandable stent was placed. Completion angiogram revealed excellent result in the iliac arteries with 0%residual stenosis and 0% residual stenosis in the left SFA-Popliteal arteries with preservation of the left distal tibial vessels. All balloons/wires removed. The 7F sheath was exchanged to 7F short sheath. Protamine given, and then the pedal sheath and the femoral sheaths were removed, and manual compression held for 10 minutes. Good hemostasis noted. Dry sterile dressing applied, and the patient tolerated the procedure well. Plan:  Bedrest x 6 hours  Plavix 300 mg po x 1, then 75 mg po q day. Restart Eliquis tomorrow. Tentative D/C if stable tonight.     Electronically signed by Katerine Patel MD on 1/30/2023 at 7:25 PM

## 2023-01-31 NOTE — PROGRESS NOTES
CT/CV Surgery Progress Note    2023 8:05 AM  Surgeon:  Dr. Gurvinder Hernandez:  Ms. Liang Waite is resting comfortably in bed on RA, alert, and in no acute distress. Pt denies chest pressure, SOB, fever,chills, N/V/D. Receiving 1 unit PRBCs and rechecking Hgb this morning.    S/p USG guidance, Right Common Femoral artery  Right Common Femoral angiogram  Selective Left Hypogastric artery angiogram  Selective Left Superficial Femoral Circumflex artery angiogram  Coil embolization of Left Superficial Femoral Circumflex artery, descending branch, with 3 mm Vortx coils x2 and 5 mm Vortx coils x 2    Vital Signs: BP (!) 142/98   Pulse 72   Temp 98 °F (36.7 °C)   Resp 26   Ht 5' 2\" (1.575 m)   Wt 124 lb (56.2 kg)   SpO2 95%   BMI 22.68 kg/m²    Temp (24hrs), Av.6 °F (36.4 °C), Min:97.2 °F (36.2 °C), Max:98 °F (36.7 °C)       Labs:   CBC:  Recent Labs     23  0959 23  1000 23  1805 23  21123  0400   WBC 3.7*  --   --  8.8 8.5   HGB 13.0  --  9.0* 9.5* 7.7*   HCT 39.1  --  27.1* 30.1* 24.3*   .0*  --   --  102.4* 102.1*     --   --  193 225   APTT  --  38.4*  --  112.8*  --    INR  --  1.04  --  1.33*  --      BMP:   Recent Labs     23  1000 23  21123  0550    145 141   K 3.8 3.2* 5.3*    118* 107   CO2 30 18* 19*   BUN 18 12 19   CREATININE 0.7 0.4 1.0   MG  --  1.0* 3.0*     Last HgA1C: No results found for: LABA1C    Imaging:  CXR: 2023      Intake/Output Summary (Last 24 hours) at 2023 0805  Last data filed at 2023 0725  Gross per 24 hour   Intake 4573.11 ml   Output --   Net 4573.11 ml       Scheduled Meds:    clopidogrel  75 mg Oral Daily    sodium chloride flush  5-40 mL IntraVENous 2 times per day    sodium chloride flush  5-40 mL IntraVENous 2 times per day    ondansetron  4 mg IntraVENous Once    calcium replacement protocol   Other RX Placeholder    budesonide  500 mcg Nebulization BID    montelukast 10 mg Oral Nightly       ROS: All neg unless specifically mentioned in subjective section. Exam:  General Appearance: alert ,conversing, in no acute distress  Cardiovascular: normal rate, regular rhythm, normal S1 and S2, no murmurs, rubs, clicks, or gallops  Pulmonary/Chest: clear to auscultation bilaterally- no wheezes, rales or rhonchi, normal air movement, no respiratory distress  Neurological: alert, oriented, normal speech, no focal findings or movement disorder noted  Bilateral Groins: R groin dressing dry/intact, no thrill. Mild distention LLQ of abdomen pt relates it has improved. Bilateral LEs: Feet warm to touch. DPs and PTs detected on doppler. Left ankle dressing dry/intact. 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 Plan to resume Plavix later today if Hgb improves after transfusion.     The plan of care was discussed in detail with Dr. Tyree Gabriel PA-C

## 2023-01-31 NOTE — PROCEDURES
Bedside Ultrasound - FAST 2330     Views obtained: RUQ, LUQ, Pelvic, Subxiphoid     RUQ - no free fluid  LUQ - no free fluid  Pelvic - no free fluid, bladder decompressed  Subxiphoid - no pericardial efffusion     Interpretation: no free fluid in abdomen, heterogenous mass in left anterior abdominal wall consistent with known left rectus sheath hematoma. Imaging above was performed and interpreted in realtime by myself.       Kellie Kruse MD  PGY-2 Emergency Medicine  Critical Care Service

## 2023-02-01 LAB
ANION GAP SERPL CALC-SCNC: 12 MEQ/L (ref 8–16)
BUN SERPL-MCNC: 31 MG/DL (ref 7–22)
CALCIUM SERPL-MCNC: 7.9 MG/DL (ref 8.5–10.5)
CHLORIDE SERPL-SCNC: 103 MEQ/L (ref 98–111)
CO2 SERPL-SCNC: 21 MEQ/L (ref 23–33)
CREAT SERPL-MCNC: 2.2 MG/DL (ref 0.4–1.2)
DEPRECATED RDW RBC AUTO: 51.6 FL (ref 35–45)
EKG Q-T INTERVAL: 436 MS
EKG QRS DURATION: 80 MS
EKG QTC CALCULATION (BAZETT): 438 MS
EKG R AXIS: 82 DEGREES
EKG T AXIS: 76 DEGREES
EKG VENTRICULAR RATE: 61 BPM
ERYTHROCYTE [DISTWIDTH] IN BLOOD BY AUTOMATED COUNT: 15.3 % (ref 11.5–14.5)
FERRITIN SERPL IA-MCNC: 250 NG/ML (ref 10–291)
GFR SERPL CREATININE-BSD FRML MDRD: 23 ML/MIN/1.73M2
GLUCOSE SERPL-MCNC: 108 MG/DL (ref 70–108)
HCT VFR BLD AUTO: 26.8 % (ref 37–47)
HGB BLD-MCNC: 8.8 GM/DL (ref 12–16)
IRON SATN MFR SERPL: 22 % (ref 20–50)
IRON SERPL-MCNC: 40 UG/DL (ref 50–170)
MCH RBC QN AUTO: 30.2 PG (ref 26–33)
MCHC RBC AUTO-ENTMCNC: 32.8 GM/DL (ref 32.2–35.5)
MCV RBC AUTO: 92.1 FL (ref 81–99)
PLATELET # BLD AUTO: 126 THOU/MM3 (ref 130–400)
PMV BLD AUTO: 11 FL (ref 9.4–12.4)
POTASSIUM SERPL-SCNC: 4.9 MEQ/L (ref 3.5–5.2)
RBC # BLD AUTO: 2.91 MILL/MM3 (ref 4.2–5.4)
SODIUM SERPL-SCNC: 136 MEQ/L (ref 135–145)
TIBC SERPL-MCNC: 184 UG/DL (ref 171–450)
WBC # BLD AUTO: 6.2 THOU/MM3 (ref 4.8–10.8)

## 2023-02-01 PROCEDURE — 99222 1ST HOSP IP/OBS MODERATE 55: CPT | Performed by: INTERNAL MEDICINE

## 2023-02-01 PROCEDURE — 2580000003 HC RX 258: Performed by: PHYSICIAN ASSISTANT

## 2023-02-01 PROCEDURE — 94761 N-INVAS EAR/PLS OXIMETRY MLT: CPT

## 2023-02-01 PROCEDURE — 94640 AIRWAY INHALATION TREATMENT: CPT

## 2023-02-01 PROCEDURE — 6370000000 HC RX 637 (ALT 250 FOR IP): Performed by: STUDENT IN AN ORGANIZED HEALTH CARE EDUCATION/TRAINING PROGRAM

## 2023-02-01 PROCEDURE — 93010 ELECTROCARDIOGRAM REPORT: CPT | Performed by: INTERNAL MEDICINE

## 2023-02-01 PROCEDURE — 83550 IRON BINDING TEST: CPT

## 2023-02-01 PROCEDURE — 2060000000 HC ICU INTERMEDIATE R&B

## 2023-02-01 PROCEDURE — 36415 COLL VENOUS BLD VENIPUNCTURE: CPT

## 2023-02-01 PROCEDURE — 2700000000 HC OXYGEN THERAPY PER DAY

## 2023-02-01 PROCEDURE — 2580000003 HC RX 258: Performed by: THORACIC SURGERY (CARDIOTHORACIC VASCULAR SURGERY)

## 2023-02-01 PROCEDURE — 99233 SBSQ HOSP IP/OBS HIGH 50: CPT | Performed by: STUDENT IN AN ORGANIZED HEALTH CARE EDUCATION/TRAINING PROGRAM

## 2023-02-01 PROCEDURE — 2580000003 HC RX 258: Performed by: INTERNAL MEDICINE

## 2023-02-01 PROCEDURE — 82728 ASSAY OF FERRITIN: CPT

## 2023-02-01 PROCEDURE — 6360000002 HC RX W HCPCS: Performed by: STUDENT IN AN ORGANIZED HEALTH CARE EDUCATION/TRAINING PROGRAM

## 2023-02-01 PROCEDURE — 83540 ASSAY OF IRON: CPT

## 2023-02-01 PROCEDURE — 85027 COMPLETE CBC AUTOMATED: CPT

## 2023-02-01 PROCEDURE — 80048 BASIC METABOLIC PNL TOTAL CA: CPT

## 2023-02-01 PROCEDURE — 6370000000 HC RX 637 (ALT 250 FOR IP): Performed by: PHYSICIAN ASSISTANT

## 2023-02-01 PROCEDURE — 6370000000 HC RX 637 (ALT 250 FOR IP): Performed by: THORACIC SURGERY (CARDIOTHORACIC VASCULAR SURGERY)

## 2023-02-01 PROCEDURE — 2580000003 HC RX 258: Performed by: STUDENT IN AN ORGANIZED HEALTH CARE EDUCATION/TRAINING PROGRAM

## 2023-02-01 RX ORDER — ALBUTEROL SULFATE 2.5 MG/3ML
2.5 SOLUTION RESPIRATORY (INHALATION) 2 TIMES DAILY
Status: DISCONTINUED | OUTPATIENT
Start: 2023-02-01 | End: 2023-02-01

## 2023-02-01 RX ORDER — IPRATROPIUM BROMIDE AND ALBUTEROL SULFATE 2.5; .5 MG/3ML; MG/3ML
1 SOLUTION RESPIRATORY (INHALATION)
Status: DISCONTINUED | OUTPATIENT
Start: 2023-02-01 | End: 2023-02-01 | Stop reason: SDUPTHER

## 2023-02-01 RX ORDER — VERAPAMIL HYDROCHLORIDE 240 MG/1
240 TABLET, FILM COATED, EXTENDED RELEASE ORAL NIGHTLY
Status: DISCONTINUED | OUTPATIENT
Start: 2023-02-01 | End: 2023-02-04 | Stop reason: HOSPADM

## 2023-02-01 RX ORDER — SODIUM CHLORIDE 9 MG/ML
INJECTION, SOLUTION INTRAVENOUS CONTINUOUS
Status: DISCONTINUED | OUTPATIENT
Start: 2023-02-01 | End: 2023-02-02

## 2023-02-01 RX ORDER — CALCIUM POLYCARBOPHIL 625 MG 625 MG/1
625 TABLET ORAL DAILY
Status: DISCONTINUED | OUTPATIENT
Start: 2023-02-01 | End: 2023-02-04 | Stop reason: HOSPADM

## 2023-02-01 RX ORDER — SENNA PLUS 8.6 MG/1
1 TABLET ORAL NIGHTLY
Status: DISCONTINUED | OUTPATIENT
Start: 2023-02-01 | End: 2023-02-04 | Stop reason: HOSPADM

## 2023-02-01 RX ORDER — ALBUTEROL SULFATE 2.5 MG/3ML
2.5 SOLUTION RESPIRATORY (INHALATION)
Status: DISCONTINUED | OUTPATIENT
Start: 2023-02-01 | End: 2023-02-01

## 2023-02-01 RX ORDER — SODIUM CHLORIDE 9 MG/ML
INJECTION, SOLUTION INTRAVENOUS CONTINUOUS
Status: DISCONTINUED | OUTPATIENT
Start: 2023-02-01 | End: 2023-02-01

## 2023-02-01 RX ORDER — ALBUTEROL SULFATE 2.5 MG/3ML
2.5 SOLUTION RESPIRATORY (INHALATION) 2 TIMES DAILY
Status: DISCONTINUED | OUTPATIENT
Start: 2023-02-01 | End: 2023-02-04 | Stop reason: HOSPADM

## 2023-02-01 RX ADMIN — APIXABAN 2.5 MG: 2.5 TABLET, FILM COATED ORAL at 11:29

## 2023-02-01 RX ADMIN — ACETAMINOPHEN 650 MG: 325 TABLET ORAL at 11:40

## 2023-02-01 RX ADMIN — ALBUTEROL SULFATE 2.5 MG: 2.5 SOLUTION RESPIRATORY (INHALATION) at 08:31

## 2023-02-01 RX ADMIN — IPRATROPIUM BROMIDE 0.5 MG: 0.5 SOLUTION RESPIRATORY (INHALATION) at 08:31

## 2023-02-01 RX ADMIN — SODIUM CHLORIDE: 9 INJECTION, SOLUTION INTRAVENOUS at 16:21

## 2023-02-01 RX ADMIN — SODIUM CHLORIDE, PRESERVATIVE FREE 10 ML: 5 INJECTION INTRAVENOUS at 08:24

## 2023-02-01 RX ADMIN — ALBUTEROL SULFATE 2 PUFF: 90 AEROSOL, METERED RESPIRATORY (INHALATION) at 00:22

## 2023-02-01 RX ADMIN — CLOPIDOGREL BISULFATE 75 MG: 75 TABLET ORAL at 08:24

## 2023-02-01 RX ADMIN — APIXABAN 2.5 MG: 2.5 TABLET, FILM COATED ORAL at 21:31

## 2023-02-01 RX ADMIN — SODIUM CHLORIDE: 9 INJECTION, SOLUTION INTRAVENOUS at 11:30

## 2023-02-01 RX ADMIN — ALBUTEROL SULFATE 2.5 MG: 2.5 SOLUTION RESPIRATORY (INHALATION) at 18:05

## 2023-02-01 RX ADMIN — MONTELUKAST SODIUM 10 MG: 10 TABLET ORAL at 19:49

## 2023-02-01 RX ADMIN — IPRATROPIUM BROMIDE 0.5 MG: 0.5 SOLUTION RESPIRATORY (INHALATION) at 18:06

## 2023-02-01 RX ADMIN — SENNOSIDES 8.6 MG: 8.6 TABLET, FILM COATED ORAL at 19:49

## 2023-02-01 RX ADMIN — BUDESONIDE 500 MCG: 0.5 INHALANT RESPIRATORY (INHALATION) at 08:39

## 2023-02-01 RX ADMIN — BUDESONIDE 500 MCG: 0.5 INHALANT RESPIRATORY (INHALATION) at 18:17

## 2023-02-01 RX ADMIN — CALCIUM POLYCARBOPHIL 625 MG: 625 TABLET, FILM COATED ORAL at 11:29

## 2023-02-01 RX ADMIN — IRON SUCROSE 100 MG: 20 INJECTION, SOLUTION INTRAVENOUS at 19:47

## 2023-02-01 ASSESSMENT — PAIN SCALES - GENERAL
PAINLEVEL_OUTOF10: 0
PAINLEVEL_OUTOF10: 3
PAINLEVEL_OUTOF10: 2
PAINLEVEL_OUTOF10: 0
PAINLEVEL_OUTOF10: 3
PAINLEVEL_OUTOF10: 3

## 2023-02-01 ASSESSMENT — ENCOUNTER SYMPTOMS
SORE THROAT: 0
BACK PAIN: 0
DIARRHEA: 0
SHORTNESS OF BREATH: 0
NAUSEA: 0
EYES NEGATIVE: 1
COUGH: 0
ABDOMINAL PAIN: 0
VOMITING: 0
EYE PAIN: 0

## 2023-02-01 ASSESSMENT — PAIN DESCRIPTION - DESCRIPTORS: DESCRIPTORS: ACHING

## 2023-02-01 ASSESSMENT — PAIN DESCRIPTION - ORIENTATION: ORIENTATION: MID;LOWER

## 2023-02-01 ASSESSMENT — PAIN - FUNCTIONAL ASSESSMENT: PAIN_FUNCTIONAL_ASSESSMENT: PREVENTS OR INTERFERES SOME ACTIVE ACTIVITIES AND ADLS

## 2023-02-01 ASSESSMENT — PAIN DESCRIPTION - LOCATION: LOCATION: ABDOMEN

## 2023-02-01 NOTE — RT PROTOCOL NOTE
RT Inhaler-Nebulizer Bronchodilator Protocol Note    There is a bronchodilator order in the chart from a provider indicating to follow the RT Bronchodilator Protocol and there is an Initiate RT Inhaler-Nebulizer Bronchodilator Protocol order as well (see protocol at bottom of note). CXR Findings:  No results found. The findings from the last RT Protocol Assessment were as follows:   History Pulmonary Disease: Chronic pulmonary disease  Respiratory Pattern: Dyspnea on exertion or RR 21-25 bpm  Breath Sounds: Slightly diminished and/or crackles  Cough: Strong, spontaneous, non-productive  Indication for Bronchodilator Therapy: Decreased or absent breath sounds, On home bronchodilators  Bronchodilator Assessment Score: 6    Aerosolized bronchodilator medication orders have been revised according to the RT Inhaler-Nebulizer Bronchodilator Protocol below. Respiratory Therapist to perform RT Therapy Protocol Assessment initially then follow the protocol. Repeat RT Therapy Protocol Assessment PRN for score 0-3 or on second treatment, BID, and PRN for scores above 3. No Indications - adjust the frequency to every 6 hours PRN wheezing or bronchospasm, if no treatments needed after 48 hours then discontinue using Per Protocol order mode. If indication present, adjust the RT bronchodilator orders based on the Bronchodilator Assessment Score as indicated below. Use Inhaler orders unless patient has one or more of the following: on home nebulizer, not able to hold breath for 10 seconds, is not alert and oriented, cannot activate and use MDI correctly, or respiratory rate 25 breaths per minute or more, then use the equivalent nebulizer order(s) with same Frequency and PRN reasons based on the score. If a patient is on this medication at home then do not decrease Frequency below that used at home.     0-3 - enter or revise RT bronchodilator order(s) to equivalent RT Bronchodilator order with Frequency of every 4 hours PRN for wheezing or increased work of breathing using Per Protocol order mode. 4-6 - enter or revise RT Bronchodilator order(s) to two equivalent RT bronchodilator orders with one order with BID Frequency and one order with Frequency of every 4 hours PRN wheezing or increased work of breathing using Per Protocol order mode. 7-10 - enter or revise RT Bronchodilator order(s) to two equivalent RT bronchodilator orders with one order with TID Frequency and one order with Frequency of every 4 hours PRN wheezing or increased work of breathing using Per Protocol order mode. 11-13 - enter or revise RT Bronchodilator order(s) to one equivalent RT bronchodilator order with QID Frequency and an Albuterol order with Frequency of every 4 hours PRN wheezing or increased work of breathing using Per Protocol order mode. Greater than 13 - enter or revise RT Bronchodilator order(s) to one equivalent RT bronchodilator order with every 4 hours Frequency and an Albuterol order with Frequency of every 2 hours PRN wheezing or increased work of breathing using Per Protocol order mode. RT to enter RT Home Evaluation for COPD & MDI Assessment order using Per Protocol order mode.     Electronically signed by Jasmeet Mcclure RCP on 2/1/2023 at 8:30 AM

## 2023-02-01 NOTE — PLAN OF CARE
Problem: Discharge Planning  Goal: Discharge to home or other facility with appropriate resources  Outcome: Progressing  Flowsheets  Taken 1/31/2023 2115 by Lucas Mead RN  Discharge to home or other facility with appropriate resources:   Identify barriers to discharge with patient and caregiver   Arrange for needed discharge resources and transportation as appropriate   Identify discharge learning needs (meds, wound care, etc)  Taken 1/31/2023 1300 by Vianney Weinstein RN  Discharge to home or other facility with appropriate resources: Identify barriers to discharge with patient and caregiver     Problem: Neurosensory - Adult  Goal: Achieves maximal functionality and self care  Outcome: Progressing  Flowsheets  Taken 1/31/2023 2115 by Lucas Mead RN  Achieves maximal functionality and self care:   Monitor swallowing and airway patency with patient fatigue and changes in neurological status   Encourage and assist patient to increase activity and self care with guidance from physical therapy/occupational therapy   Encourage visually impaired, hearing impaired and aphasic patients to use assistive/communication devices  Taken 1/31/2023 1300 by Vianney Weinstein RN  Achieves maximal functionality and self care: Monitor swallowing and airway patency with patient fatigue and changes in neurological status     Problem: Respiratory - Adult  Goal: Achieves optimal ventilation and oxygenation  Outcome: Progressing  Flowsheets  Taken 1/31/2023 2115 by Lucas Mead RN  Achieves optimal ventilation and oxygenation:   Assess for changes in respiratory status   Assess for changes in mentation and behavior   Position to facilitate oxygenation and minimize respiratory effort   Oxygen supplementation based on oxygen saturation or arterial blood gases   Initiate smoking cessation protocol as indicated   Encourage broncho-pulmonary hygiene including cough, deep breathe, incentive spirometry   Assess the need for suctioning and aspirate as needed   Assess and instruct to report shortness of breath or any respiratory difficulty   Respiratory therapy support as indicated  Taken 1/31/2023 1300 by Remy Gagnon RN  Achieves optimal ventilation and oxygenation:   Assess for changes in respiratory status   Assess for changes in mentation and behavior   Position to facilitate oxygenation and minimize respiratory effort   Oxygen supplementation based on oxygen saturation or arterial blood gases   Assess the need for suctioning and aspirate as needed     Problem: Cardiovascular - Adult  Goal: Maintains optimal cardiac output and hemodynamic stability  Outcome: Progressing  Flowsheets  Taken 1/31/2023 2115 by Tavares Murphy RN  Maintains optimal cardiac output and hemodynamic stability:   Monitor blood pressure and heart rate   Monitor urine output and notify Licensed Independent Practitioner for values outside of normal range   Assess for signs of decreased cardiac output   Administer fluid and/or volume expanders as ordered  Taken 1/31/2023 1300 by Remy Gagnon RN  Maintains optimal cardiac output and hemodynamic stability:   Monitor blood pressure and heart rate   Monitor urine output and notify Licensed Independent Practitioner for values outside of normal range   Assess for signs of decreased cardiac output   Administer fluid and/or volume expanders as ordered     Problem: Skin/Tissue Integrity - Adult  Goal: Incisions, wounds, or drain sites healing without S/S of infection  Outcome: Progressing  Flowsheets  Taken 1/31/2023 2130 by Tavares Murphy RN  Incisions, Wounds, or Drain Sites Healing Without Sign and Symptoms of Infection:   ADMISSION and DAILY: Assess and document risk factors for pressure ulcer development   TWICE DAILY: Assess and document skin integrity   TWICE DAILY: Assess and document dressing/incision, wound bed, drain sites and surrounding tissue   Implement wound care per orders   Initiate pressure ulcer prevention bundle as indicated  Taken 1/31/2023 1300 by Patience Powers RN  Incisions, Wounds, or Drain Sites Healing Without Sign and Symptoms of Infection: TWICE DAILY: Assess and document skin integrity     Problem: Musculoskeletal - Adult  Goal: Return mobility to safest level of function  Outcome: Progressing  Flowsheets  Taken 1/31/2023 2115 by Cecilio Brown RN  Return Mobility to Safest Level of Function:   Assess patient stability and activity tolerance for standing, transferring and ambulating with or without assistive devices   Assist with transfers and ambulation using safe patient handling equipment as needed   Ensure adequate protection for wounds/incisions during mobilization   Obtain physical therapy/occupational therapy consults as needed   Instruct patient/family in ordered activity level  Taken 1/31/2023 1300 by Patience Powers RN  Return Mobility to Safest Level of Function:   Assess patient stability and activity tolerance for standing, transferring and ambulating with or without assistive devices   Assist with transfers and ambulation using safe patient handling equipment as needed  Goal: Return ADL status to a safe level of function  Outcome: Progressing  Flowsheets  Taken 1/31/2023 2115 by Cecilio Brown RN  Return ADL Status to a Safe Level of Function:   Administer medication as ordered   Assess activities of daily living deficits and provide assistive devices as needed   Obtain physical therapy/occupational therapy consults as needed   Assist and instruct patient to increase activity and self care as tolerated  Taken 1/31/2023 1300 by Patience Powers RN  Return ADL Status to a Safe Level of Function:   Administer medication as ordered   Assess activities of daily living deficits and provide assistive devices as needed     Problem: Gastrointestinal - Adult  Goal: Minimal or absence of nausea and vomiting  Outcome: Progressing  Flowsheets  Taken 1/31/2023 2115 by Cecilio Brown RN  Minimal or absence of nausea and vomiting:   Administer IV fluids as ordered to ensure adequate hydration   Administer ordered antiemetic medications as needed   Provide nonpharmacologic comfort measures as appropriate   Advance diet as tolerated, if ordered   Nutrition consult to assist patient with adequate nutrition and appropriate food choices  Taken 1/31/2023 1300 by Chidi Sultana RN  Minimal or absence of nausea and vomiting: Administer IV fluids as ordered to ensure adequate hydration     Problem: Genitourinary - Adult  Goal: Absence of urinary retention  Outcome: Progressing  Flowsheets  Taken 1/31/2023 2115 by Dulce Dodd RN  Absence of urinary retention:   Assess patients ability to void and empty bladder   Monitor intake/output and perform bladder scan as needed  Taken 1/31/2023 1300 by Chidi Sultana RN  Absence of urinary retention:   Assess patients ability to void and empty bladder   Monitor intake/output and perform bladder scan as needed     Problem: Infection - Adult  Goal: Absence of infection at discharge  Outcome: Progressing  Flowsheets  Taken 1/31/2023 2115 by Dulce Dodd RN  Absence of infection at discharge:   Assess and monitor for signs and symptoms of infection   Monitor lab/diagnostic results   Monitor all insertion sites i.e., indwelling lines, tubes and drains  Taken 1/31/2023 1300 by Chidi Sultana RN  Absence of infection at discharge:   Assess and monitor for signs and symptoms of infection   Monitor lab/diagnostic results   Monitor all insertion sites i.e., indwelling lines, tubes and drains     Problem: Pain  Goal: Verbalizes/displays adequate comfort level or baseline comfort level  Outcome: Progressing  Flowsheets (Taken 1/31/2023 2115)  Verbalizes/displays adequate comfort level or baseline comfort level:   Encourage patient to monitor pain and request assistance   Assess pain using appropriate pain scale   Administer analgesics based on type and severity of pain and evaluate response   Implement non-pharmacological measures as appropriate and evaluate response   Consider cultural and social influences on pain and pain management   Notify Licensed Independent Practitioner if interventions unsuccessful or patient reports new pain     Problem: ABCDS Injury Assessment  Goal: Absence of physical injury  Outcome: Progressing

## 2023-02-01 NOTE — RT PROTOCOL NOTE
RT Inhaler-Nebulizer Bronchodilator Protocol Note    There is a bronchodilator order in the chart from a provider indicating to follow the RT Bronchodilator Protocol and there is an Initiate RT Inhaler-Nebulizer Bronchodilator Protocol order as well (see protocol at bottom of note). CXR Findings:  No results found. The findings from the last RT Protocol Assessment were as follows:   History Pulmonary Disease: Chronic pulmonary disease  Respiratory Pattern: Dyspnea on exertion or RR 21-25 bpm  Breath Sounds: Slightly diminished and/or crackles  Cough: Strong, spontaneous, non-productive  Indication for Bronchodilator Therapy: Decreased or absent breath sounds, On home bronchodilators  Bronchodilator Assessment Score: 6    Aerosolized bronchodilator medication orders have been revised according to the RT Inhaler-Nebulizer Bronchodilator Protocol below. Respiratory Therapist to perform RT Therapy Protocol Assessment initially then follow the protocol. Repeat RT Therapy Protocol Assessment PRN for score 0-3 or on second treatment, BID, and PRN for scores above 3. No Indications - adjust the frequency to every 6 hours PRN wheezing or bronchospasm, if no treatments needed after 48 hours then discontinue using Per Protocol order mode. If indication present, adjust the RT bronchodilator orders based on the Bronchodilator Assessment Score as indicated below. Use Inhaler orders unless patient has one or more of the following: on home nebulizer, not able to hold breath for 10 seconds, is not alert and oriented, cannot activate and use MDI correctly, or respiratory rate 25 breaths per minute or more, then use the equivalent nebulizer order(s) with same Frequency and PRN reasons based on the score. If a patient is on this medication at home then do not decrease Frequency below that used at home.     0-3 - enter or revise RT bronchodilator order(s) to equivalent RT Bronchodilator order with Frequency of every 4 hours PRN for wheezing or increased work of breathing using Per Protocol order mode. 4-6 - enter or revise RT Bronchodilator order(s) to two equivalent RT bronchodilator orders with one order with BID Frequency and one order with Frequency of every 4 hours PRN wheezing or increased work of breathing using Per Protocol order mode. 7-10 - enter or revise RT Bronchodilator order(s) to two equivalent RT bronchodilator orders with one order with TID Frequency and one order with Frequency of every 4 hours PRN wheezing or increased work of breathing using Per Protocol order mode. 11-13 - enter or revise RT Bronchodilator order(s) to one equivalent RT bronchodilator order with QID Frequency and an Albuterol order with Frequency of every 4 hours PRN wheezing or increased work of breathing using Per Protocol order mode. Greater than 13 - enter or revise RT Bronchodilator order(s) to one equivalent RT bronchodilator order with every 4 hours Frequency and an Albuterol order with Frequency of every 2 hours PRN wheezing or increased work of breathing using Per Protocol order mode. RT to enter RT Home Evaluation for COPD & MDI Assessment order using Per Protocol order mode.     Electronically signed by Fabiano Rosas RCP on 2/1/2023 at 6:26 PM

## 2023-02-01 NOTE — PLAN OF CARE
Problem: Respiratory - Adult  Goal: Achieves optimal ventilation and oxygenation  2/1/2023 1824 by Clifford Ibarra RCP  Outcome: Progress  Patient continues on breathing treatments; tolerating well.   Will continue to monitor patients respiratory status

## 2023-02-01 NOTE — PLAN OF CARE
Problem: Respiratory - Adult  Goal: Achieves optimal ventilation and oxygenation  2/1/2023 0840 by Janice Anderson RCP  Outcome: Progressing

## 2023-02-01 NOTE — CONSULTS
[facility]          [facility]        [address]        [place] [facility]        [phone]           Inpatient Initial consult note         2/1/2023 2:12 PM      Patient Name:   Cristóbal Almendarez  YOB: 1945  Primary Care Physician:  Bindu Harris. Perez Serna MD   Admit Date:    1/30/2023  9:28 AM    Consultation requested by : Rossana Dc MD    Reason for Consult : Nephrology following for acute kidney injury. History of presenting illness :Cristóbal Almendarez is a 69 y.o.   female with Past Medical History as stated below presented with a chief complaint of No chief complaint on file. on 1/30/2023 .     Patient was having some bilateral heel blisters and open ulcer for the prolonged duration of time and the wound has not been healing well patient was seen by PCP subsequently was sent to Dr. Citlaly Dias who felt the patient has poor distal pulses and peripheral arterial disease and she had an abnormal ROBERT she was seen by Dr. Adriel Michelle and she came in for an elective arteriogram done which happened on 1/30/2022 and she had almost 4 stents placed the documented contrast use was only around the 50 mL    Patient's creatinine normally at baseline is around 0.7 and the creatinine has consistently gotten worse it was 2.2 today and so nephrology has been consulted for further evaluation and management    Past History:  Past Medical History:   Diagnosis Date    Abnormal weight gain     Adjustment disorder     Anemia     Anxiety     Breast cancer ([facility] [address] 2018    Cancer ([facility] [address] 2018    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    COPD (chronic obstructive pulmonary disease) (HCC)     GERD (gastroesophageal reflux disease)     Heart palpitations     History of therapeutic radiation 2018    Hypertension     Hypokalemia     Tinea unguium      Past Surgical History:   Procedure Laterality Date    BREAST SURGERY Right 4/9/2019    RIGHT BREAST EXCISIONAL BX, PREOP NEEDLE LOC performed by Lizbeth Glover MD at [place] [facility] SURGERY      HERNIA REPAIR      umbilical -as a child    LUNG BIOPSY  2016    [facility]-    OTHER SURGICAL HISTORY      cubital tunnel release left elbow-    OTHER SURGICAL HISTORY      Nu removal-    OTHER SURGICAL HISTORY  2018    excision back/lipoma    RI MASTECTOMY PARTIAL Right 3/15/2018    RIGHT BREAST LUMPECTOMY, SENTINAL LYMPH NODE BIOPSY performed by Marli Bosch MD at [address] [facility] OFF[facility]/OUTPT [address] [facility] N/A 2018    EXCISION BACK LIPOMA performed by Marli Bosch MD at [facility] 8 LEFT Left 2019    US BREAST BIOPSY W LOC DEVICE 1ST LESION RIGHT Right 2019     Social History     Socioeconomic History    Marital status:      Spouse name: Not on file    Number of children: 2    Years of education: Not on file    Highest education level: Not on file   Occupational History    Occupation: retired   Tobacco Use    Smoking status: Former     Packs/day: 2.00     Types: Cigarettes     Quit date: 2021     Years since quittin.5    Smokeless tobacco: Never   Vaping Use    Vaping Use: Every day    Start date: 2021    Substances: Nicotine    Devices: Pre-filled or refillable cartridge   Substance and Sexual Activity    Alcohol use: No    Drug use: Yes     Frequency: 2.0 times per week     Types: Marijuana Srini Passy)     Comment: per week    Sexual activity: Not Currently   Other Topics Concern    Not on file   Social History Narrative    Not on file     Social Determinants of Health     Financial Resource Strain: Not on file   Food Insecurity: Not on file   Transportation Needs: Not on file   Physical Activity: Not on file   Stress: Not on file   Social Connections: Not on file   Intimate Partner Violence: Not on file   Housing Stability: Not on file     Family History   Problem Relation Age of Onset    Cancer Mother         lung    Breast Cancer Mother 28        age 28-36    Other Father         leukemia    Other Sister breast fibrosis    Breast Cancer Sister 39    Liver Disease Sister     Other Brother         suicide    Diabetes Maternal Grandmother        Medications & Allergies :  Prior to Admission medications    Medication Sig Start Date End Date Taking?  Authorizing Provider   clopidogrel (PLAVIX) 75 MG tablet Take 1 tablet by mouth daily 1/31/23  Yes Tiana Gaucher, MD   Neomycin-Bacitracin-Polymyxin (HCA TRIPLE ANTIBIOTIC OINTMENT EX) Apply topically as needed    Historical Provider, MD   azithromycin (ZITHROMAX) 250 MG tablet Take 250 mg by mouth daily    Historical Provider, MD   potassium chloride (KLOR-CON) 20 MEQ packet Take 20 mEq by mouth 2 times daily    Historical Provider, MD   ELIQUIS 5 MG TABS tablet Take 5 mg by mouth 2 times daily 4/19/21   Historical Provider, MD   Zinc Acetate, Oral, (ZINC ACETATE PO) Take by mouth    Historical Provider, MD   Ascorbic Acid (VITAMIN C) 250 MG tablet Take 250 mg by mouth daily    Historical Provider, MD   NONFORMULARY Neuroflow plus    Historical Provider, MD   Biotin 2500 MCG CAPS Take by mouth    Historical Provider, MD   Hydroxyurea (HYDREA PO) Take by mouth daily    Historical Provider, MD   Atorvastatin Calcium (LIPITOR PO) Take by mouth daily    Historical Provider, MD   guaiFENesin (MUCINEX) 600 MG extended release tablet Take 1,200 mg by mouth 2 times daily    Historical Provider, MD   nystatin (MYCOSTATIN) 862554 UNIT/ML suspension Take 500,000 Units by mouth 4 times daily    Historical Provider, MD   Multiple Vitamins-Minerals (CENTRUM SILVER PO) Take 1 tablet by mouth daily    Historical Provider, MD   vitamin D (CHOLECALCIFEROL) 1000 UNIT TABS tablet Take 1,000 Units by mouth daily    Historical Provider, MD   budesonide (PULMICORT) 0.5 MG/2ML nebulizer suspension Take 1 ampule by nebulization 2 times daily    Historical Provider, MD   verapamil (VERELAN) 240 MG extended release capsule Take 240 mg by mouth nightly    Historical Provider, MD   montelukast (SINGULAIR) 10 MG tablet Take 10 mg by mouth nightly    Historical Provider, MD   ALPRAZolam (XANAX) 0.5 MG tablet Take 0.5 mg by mouth 3 times daily as needed for Sleep. Historical Provider, MD   benzonatate (TESSALON) 100 MG capsule Take 100 mg by mouth 3 times daily as needed for Cough    Historical Provider, MD   albuterol sulfate  (90 Base) MCG/ACT inhaler Inhale 2 puffs into the lungs every 4 hours as needed for Wheezing    Historical Provider, MD   sennosides-docusate sodium (SENOKOT-S) 8.6-50 MG tablet Take 1 tablet by mouth daily as needed for Constipation    Historical Provider, MD   NONFORMULARY Eye drops as needed FOR DRY EYE    Historical Provider, MD   fluticasone (FLONASE) 50 MCG/ACT nasal spray 1 spray by Nasal route daily as needed for Rhinitis    Historical Provider, MD   gabapentin (NEURONTIN) 100 MG capsule Take 100 mg by mouth 3 times daily. Historical Provider, MD     Allergies: Brethine [terbutaline], Norco [hydrocodone-acetaminophen], and Keflex [cephalexin]  IP meds : Scheduled Meds:   ipratropium  0.5 mg Nebulization BID    And    albuterol  2.5 mg Nebulization BID    apixaban  2.5 mg Oral BID    senna  1 tablet Oral Nightly    polycarbophil  625 mg Oral Daily    clopidogrel  75 mg Oral Daily    sodium chloride flush  5-40 mL IntraVENous 2 times per day    sodium chloride flush  5-40 mL IntraVENous 2 times per day    ondansetron  4 mg IntraVENous Once    calcium replacement protocol   Other RX Placeholder    budesonide  500 mcg Nebulization BID    montelukast  10 mg Oral Nightly     Continuous Infusions:   sodium chloride 50 mL/hr at 02/01/23 1130    sodium chloride Stopped (01/30/23 1056)    sodium chloride         Review of Systems  Review of Systems   Constitutional:  Negative for chills, fatigue and fever. HENT:  Negative for ear pain and sore throat. Eyes: Negative. Negative for pain. Respiratory:  Negative for cough and shortness of breath.     Cardiovascular: Negative for chest pain and leg swelling. Gastrointestinal:  Negative for abdominal pain, diarrhea, nausea and vomiting. Genitourinary:  Negative for dysuria, frequency and hematuria. Musculoskeletal:  Negative for back pain and neck pain. Skin:  Positive for wound. Negative for rash. Neurological:  Negative for dizziness, light-headedness and headaches. Psychiatric/Behavioral:  Negative for agitation and confusion. Physical Exam  Physical Exam  Vitals reviewed. Constitutional:       Appearance: Normal appearance. HENT:      Head: Normocephalic and atraumatic. Right Ear: External ear normal.      Left Ear: External ear normal.      Nose: Nose normal.      Mouth/Throat:      Mouth: Mucous membranes are moist.   Eyes:      General: No scleral icterus. Right eye: No discharge. Left eye: No discharge. Conjunctiva/sclera: Conjunctivae normal.   Cardiovascular:      Rate and Rhythm: Normal rate and regular rhythm. Heart sounds: Normal heart sounds. Pulmonary:      Effort: Pulmonary effort is normal. No respiratory distress. Breath sounds: Normal breath sounds. No wheezing. Abdominal:      General: There is no distension. Palpations: Abdomen is soft. Tenderness: There is no abdominal tenderness. Musculoskeletal:         General: No swelling. Cervical back: Normal range of motion and neck supple. Right lower leg: No edema. Left lower leg: No edema. Skin:     General: Skin is warm and dry. Findings: No rash. Neurological:      General: No focal deficit present. Mental Status: She is alert and oriented to person, place, and time.    Psychiatric:         Mood and Affect: Mood normal.         Behavior: Behavior normal.        BP (!) 114/49   Pulse 66   Temp 99.1 °F (37.3 °C) (Axillary)   Resp 16   Ht 5' 2\" (1.575 m)   Wt 123 lb 14.4 oz (56.2 kg)   SpO2 96%   BMI 22.66 kg/m²   Labs, Radiology and Tests :  CBC:   Recent Labs 01/30/23 2119 01/31/23  0400 01/31/23  0913 01/31/23  1308 01/31/23  1939 02/01/23  0337   WBC 8.8 8.5  --   --   --  6.2   HGB 9.5* 7.7*   < > 8.5* 9.6* 8.8*   HCT 30.1* 24.3*   < > 25.5* 28.9* 26.8*    225  --   --   --  126*    < > = values in this interval not displayed. CMP:  Recent Labs     01/30/23 2119 01/31/23  0550 02/01/23 0337    141 136   K 3.2* 5.3* 4.9   * 107 103   CO2 18* 19* 21*   BUN 12 19 31*   CREATININE 0.4 1.0 2.2*   GLUCOSE 103 138* 108   CALCIUM 5.8* 9.2 7.9*   MG 1.0* 3.0*  --      Hepatic: No results for input(s): LABALBU, AST, ALT, ALB, BILITOT, ALKPHOS in the last 72 hours. Radiology : CT scan of the abdomen shows normal kidneys    Old lab data have been reviewed and noted patient's baseline creatinine is around 0.4    Assessment and Plan:  Renal -acute kidney injury due to most likely contrast nephropathy patient has an arteriogram with 4 stents placed and also had a CTA of the abdomen with contrast.  She is currently in the acute phase of ATN at this time continue gentle IV fluids will increase slightly to 75 mill per hour  Monitor closely should stabilize in the next 1-2 days and starts to decline  Electrolytes -mild hyperkalemia seems to be doing better  Acidosis stable  Anemia possibly due to rectus sheath hematoma  Peripheral arterial disease status post arteriogram with stent placements on 1/30/2023  Rectus sheath hematoma  Meds reviewed and discussed with patient    Elaina Ochoa MD  [facility]    This report has been created using voice recognition software.  It may contain minor errors which are inherent in voice recognition technology

## 2023-02-01 NOTE — CARE COORDINATION
2/1/23, 7:30 AM EST    Patient goals/plan/ treatment preferences discussed by  and . Patient goals/plan/ treatment preferences reviewed with patient/ family. Patient/ family verbalize understanding of discharge plan and are in agreement with goal/plan/treatment preferences. Understanding was demonstrated using the teach back method. AVS provided by RN at time of discharge, which includes all necessary medical information pertaining to the patients current course of illness, treatment, post-discharge goals of care, and treatment preferences.      Services At/After Discharge: None  Plans home w daughter Roman Needle when medically cleared (Creatinine 2.2; Urine Output = 480 ml/24h; Nephrology consulted); has [employer] home oxygen 2-4L,nebulizer       IMM Letter  IMM Letter given to Patient/Family/Significant other/Guardian/POA/by[de-identified]  - Sierra Kilpatrick  IMM Letter date given[de-identified] 01/31/23  IMM Letter time given[de-identified] 8918

## 2023-02-01 NOTE — PROGRESS NOTES
Internal Medicine Resident Progress Note    Patient:  Carolyn Brown    YOB: 1945  Unit/Bed:4K-05/005-A  MRN: [id number]    Acct: [de-identified]   PCP: Miguel Villaseñor. Pia Vigil MD    Date of Admission: 1/30/2023      Assessment/Plan:  Acute Blood Loss Anemia 2/2 Left Rectus Sheath Hematoma s/p Left Lower Extremity Angiogram: Pt had successful left lower extremity angiogram on 1/30 but developed hypotension and was found be be in hemorrhagic shock. S/p coiling to left superficial femoral circumflex artery w/ 3 mm Vortex coils x2 and 5 mm Vortex coils x 2. S/p 2 units FFP and 3 units pRBC   Venofer started on 2/1  Daily CBC  Transfuse to keep Hgb > 7  PAD w/ Claudication: s/p stents to left SFA, left popliteal, left common and external iliacs by Claudia Arreaga MD on 1/30/23. ASA, Plavix, and Eliquis were resumed by CT surgery on 2/1  JIMMY 2/2 contrast nephropathy. Intrinsic JIMMY. BUN/Cr of 31/2.2. Baseline creatinine of 1.0. No hx of CKD. Gently IVF 75 mL/hr  Nephrology following   Chronic Hypoxic Respiratory Failure: 2/2 hx of COPD. Uses 2L NC at home. Continue 2.0 L/min on NC (at baseline) to keep SpO2 > 90%  Echo ordered on 2/1 for shortness of breath    COPD: No PFTs on file. Pt uses 2L NC at home. 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 Rossana Treviño 2019. Follows with Dr. Carlos Ohara.  Continue annual mammograms  GERD: Continue home PPI  Anxiety: Takes xanax 0.5 mg TID  Former Tobacco Smoker: 55 pack year smoking hx       Hemorrhagic shock - resolved  Bradycardia (vasovagal) 2/2 emesis on 1/30 - resolved     Expected discharge date:  1-2 days    Disposition:   [x] Home  [] TCU  [] Rehab  [] Psych  [] SNF  [] [place]  [] Other-    ===================================================================      Chief Complaint: Angiogram procedure     Hospital Course: Mike Loja a 69 y.o. female who we are asked to see/evaluate by Dr. Anatoliy Howe for medical management of hypotension, bradycardia status post rectus sheath hematoma repair. [facility] presented to the hospital today for an elective outpatient left lower extremity angiogram with possible intervention with .  Medical history includes surgically resected right sided breast cancer status post radiation therapy, COPD with 2 L/min continuously and 4 L/min at night. Hypertension, anxiety, prior tobacco user, gastroesophageal reflux disease, atrial fibrillation on apixaban. Had a history of left foot ulcer and peripheral arterial disease and was initially seen by podiatry and then referred for possible intervention to vascular surgery. Today she had aortogram, stents to her left superficial femoral artery and popliteal artery, left common iliac artery, left external iliac artery. Patient tolerated the procedure well was taken back to PACU area. There she started complaining of abdominal pain noted to be tender and firm on the left side and had a drop in her blood pressure to 90 systolic. She was taken for a emergent CT scan which showed a left rectus sheath hematoma with some contrast in the lower pelvis. She was therefore taken emergently back to interventional radiology with Dr. Anatoliy Howe where he reaccessed the right femoral artery, found the source of bleeding in the circumflex artery, placed 3 coils in this with resolution of bleeding. Was noted to be hypotensive and bradycardic both in PACU and during the procedure, received a total of 2.5 L of crystalloid and arrived in the ICU with 1 unit of PRBC running.  Patient states she would like to avoid further blood transfusions as the blood she might receive might be from somebody who had received a vaccine against COVID-19. She is worried about receiving blood from somebody who had gotten a vaccine against COVID-19. She is agreeable to additional blood transfusions if necessary. On arrival to the ICU patient complained of nausea and had one episode of emesis, this improved after given ondansetron. During the episode of emesis she bradycardia down to the 40s.,  This resolved when she stopped vomiting. \"     2/1/23: ASA, Eliquis, and Plavix have been resumed by CT surgery. Nephro consulted for JIMMY. PT/OT consulted. Subjective (past 24 hours):   Pt seen at bedside. Watching TV in bed. Denies n/v/d, lightlessness, chills, CP, abdominal pain, or bleeding. ROS: reviewed complete ROS unchanged unless otherwise stated in hospital course/subjective portion.        Medications:  Reviewed    Infusion Medications    sodium chloride 80 mL/hr at 02/01/23 1621    sodium chloride Stopped (01/30/23 1056)    sodium chloride       Scheduled Medications    ipratropium  0.5 mg Nebulization BID    And    albuterol  2.5 mg Nebulization BID    apixaban  2.5 mg Oral BID    senna  1 tablet Oral Nightly    polycarbophil  625 mg Oral Daily    iron sucrose  100 mg IntraVENous Q24H    clopidogrel  75 mg Oral Daily    sodium chloride flush  5-40 mL IntraVENous 2 times per day    sodium chloride flush  5-40 mL IntraVENous 2 times per day    ondansetron  4 mg IntraVENous Once    calcium replacement protocol   Other RX Placeholder    budesonide  500 mcg Nebulization BID    montelukast  10 mg Oral Nightly     PRN Meds: sodium chloride flush, sodium chloride, sodium chloride flush, ondansetron **OR** ondansetron, acetaminophen, oxyCODONE **OR** oxyCODONE, sodium chloride, albuterol sulfate HFA, potassium chloride **OR** potassium alternative oral replacement **OR** potassium chloride, magnesium sulfate        Intake/Output Summary (Last 24 hours) at 2/1/2023 5077  Last data filed at 2/1/2023 1402  Gross per 24 hour   Intake 10 ml   Output 550 ml   Net -540 ml       Exam:  BP (!) 140/59   Pulse 60   Temp 97.6 °F (36.4 °C) (Oral)   Resp 18   Ht 5' 2\" (1.575 m)   Wt 123 lb 14.4 oz (56.2 kg)   SpO2 100%   BMI 22.66 kg/m²     General: No distress, appears stated age. Eyes:  PERRL. Conjunctivae/corneas clear. HENT: Head normal appearing. Nares normal. Oral mucosa moist.  Hearing intact. Neck: Supple, with full range of motion. Trachea midline. No gross JVD appreciated. Respiratory:  Normal effort. Clear to auscultation, without rales or wheezes or rhonchi. Cardiovascular: Normal rate, regular rhythm with normal S1/S2 without murmurs. No lower extremity edema. Abdomen: Tender abdomen on palpitation, abdominal binder in place  Musculoskeletal: No joint swelling or tenderness. Normal tone. No abnormal movements. Skin: Warm and dry. No rashes or lesions. Neurologic:  No focal sensory/motor deficits in the upper or lower extremities. Cranial nerves:  grossly non-focal 2-12. Psychiatric: Alert and oriented, normal insight and thought content. Capillary Refill: Brisk,< 3 seconds. Peripheral Pulses: +2 palpable, equal bilaterally. Labs:   Recent Labs     01/30/23  2119 01/31/23  0400 01/31/23  0913 01/31/23  1308 01/31/23  1939 02/01/23 0337   WBC 8.8 8.5  --   --   --  6.2   HGB 9.5* 7.7*   < > 8.5* 9.6* 8.8*   HCT 30.1* 24.3*   < > 25.5* 28.9* 26.8*    225  --   --   --  126*    < > = values in this interval not displayed. Recent Labs     01/30/23  2119 01/31/23  0550 02/01/23  0337    141 136   K 3.2* 5.3* 4.9   * 107 103   CO2 18* 19* 21*   BUN 12 19 31*   CREATININE 0.4 1.0 2.2*   CALCIUM 5.8* 9.2 7.9*     No results for input(s): AST, ALT, BILIDIR, BILITOT, ALKPHOS in the last 72 hours. Recent Labs     01/30/23  1000 01/30/23  2119   INR 1.04 1.33*     No results for input(s): TROPONINT in the last 72 hours. No results for input(s): PROCAL in the last 72 hours.  No results found for: NITRU, [address], BACTERIA, [address], [place] [address], [address]    Radiology (48 hours):  No results found. DVT prophylaxis:    [] Lovenox  [] SCDs  [] SQ Heparin  [] Encourage ambulation   [x] Already on Anticoagulation       Diet: ADULT DIET;  Full Liquid  Code Status: Full Code  PT/OT: Ordered  Tele: Continuous   IVF: gentle IVF    Electronically signed by Corrinne Siemens, DO PGY-1 on 2/1/2023 at 6:37 PM    Case was discussed with Attending, Dr. Maylin Rodriguez MD.

## 2023-02-01 NOTE — PROGRESS NOTES
CT/CV Surgery Progress Note    2023 10:13 AM  Surgeon:  Dr. Bradford     Subjective:  Ms. Arndt is resting comfortably in bed on RA, alert, and in no acute distress. Pt denies chest pressure, SOB, fever,chills, N/V/D and abdominal discomfort is improving.  Transferred to stepdown last evening.  EKG-afib.  Hgb improved to 9.6 after 1 unit PRBcs.  Plavix started yesterday afternoon.  Cr increased 2.2     S/p USG guidance, Right Common Femoral artery  Right Common Femoral angiogram  Selective Left Hypogastric artery angiogram  Selective Left Superficial Femoral Circumflex artery angiogram  Coil embolization of Left Superficial Femoral Circumflex artery, descending branch, with 3 mm Vortx coils x2 and 5 mm Vortx coils x 2  POD # 2    Vital Signs: BP (!) 124/47   Pulse 78   Temp 98.6 °F (37 °C) (Oral)   Resp 16   Ht 5' 2\" (1.575 m)   Wt 123 lb 14.4 oz (56.2 kg)   SpO2 96%   BMI 22.66 kg/m²    Temp (24hrs), Av.3 °F (37.4 °C), Min:98.6 °F (37 °C), Max:100 °F (37.8 °C)       Labs:   CBC:  Recent Labs     23  1000 23  1805 23  2119 23  0400 23  0913 23  1308 23  1939 23  0337   WBC  --   --  8.8 8.5  --   --   --  6.2   HGB  --    < > 9.5* 7.7*   < > 8.5* 9.6* 8.8*   HCT  --    < > 30.1* 24.3*   < > 25.5* 28.9* 26.8*   MCV  --   --  102.4* 102.1*  --   --   --  92.1   PLT  --   --  193 225  --   --   --  126*   APTT 38.4*  --  112.8*  --   --   --   --   --    INR 1.04  --  1.33*  --   --   --   --   --     < > = values in this interval not displayed.       BMP:   Recent Labs     23  2119 23  0550 23  0337    141 136   K 3.2* 5.3* 4.9   * 107 103   CO2 18* 19* 21*   BUN 12 19 31*   CREATININE 0.4 1.0 2.2*   MG 1.0* 3.0*  --        Last HgA1C: No results found for: LABA1C        Intake/Output Summary (Last 24 hours) at 2023 1013  Last data filed at 2023 0824  Gross per 24 hour   Intake 390.01 ml   Output 480 ml   Net  -89.99 ml         Scheduled Meds:    ipratropium  0.5 mg Nebulization BID    And    albuterol  2.5 mg Nebulization BID    apixaban  2.5 mg Oral BID    clopidogrel  75 mg Oral Daily    sodium chloride flush  5-40 mL IntraVENous 2 times per day    sodium chloride flush  5-40 mL IntraVENous 2 times per day    ondansetron  4 mg IntraVENous Once    calcium replacement protocol   Other RX Placeholder    budesonide  500 mcg Nebulization BID    montelukast  10 mg Oral Nightly       ROS: All neg unless specifically mentioned in subjective section. Exam:  General Appearance: alert ,conversing, in no acute distress  Cardiovascular: normal rate, regular rhythm, normal S1 and S2, no murmurs, rubs, clicks, or gallops  Pulmonary/Chest: clear to auscultation bilaterally- no wheezes, rales or rhonchi, normal air movement, no respiratory distress  Neurological: alert, oriented, normal speech, no focal findings or movement disorder noted  Bilateral Groins: R groin dressing dry/intact, no thrill. Mild distention LLQ of abdomen continues to improve  Bilateral LEs: Feet warm to touch. DPs and PTs detected on doppler. Left ankle dressing dry/intact. 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 Encourage ambulation.     The plan of care was discussed in detail with Dr. Riccardo Conner PA-C

## 2023-02-01 NOTE — SIGNIFICANT EVENT
Patient has been off vasopressors since noon, maintaining blood pressure. Will transfer to [facility].     Transfer of Care Message via [employer] sent to Dr. Maddie Avendano (Hospitalist) at [address] [place] [address]

## 2023-02-01 NOTE — PLAN OF CARE
Problem: Discharge Planning  Goal: Discharge to home or other facility with appropriate resources  2/1/2023 0900 by Ivette Rivers RN  Outcome: Progressing  Flowsheets (Taken 2/1/2023 4922)  Discharge to home or other facility with appropriate resources: Identify barriers to discharge with patient and caregiver  2/1/2023 0231 by Vijay Fields RN  Outcome: Progressing  Flowsheets  Taken 1/31/2023 2115 by Vijay Fields RN  Discharge to home or other facility with appropriate resources:   Identify barriers to discharge with patient and caregiver   Arrange for needed discharge resources and transportation as appropriate   Identify discharge learning needs (meds, wound care, etc)  Taken 1/31/2023 1300 by Luana Serrano RN  Discharge to home or other facility with appropriate resources: Identify barriers to discharge with patient and caregiver     Problem: Neurosensory - Adult  Goal: Achieves maximal functionality and self care  2/1/2023 0900 by Ivette Rivers RN  Outcome: Progressing  Flowsheets (Taken 2/1/2023 0032)  Achieves maximal functionality and self care: Monitor swallowing and airway patency with patient fatigue and changes in neurological status  2/1/2023 0231 by Vijay Fields RN  Outcome: Progressing  Flowsheets  Taken 1/31/2023 2115 by Vijay Fields RN  Achieves maximal functionality and self care:   Monitor swallowing and airway patency with patient fatigue and changes in neurological status   Encourage and assist patient to increase activity and self care with guidance from physical therapy/occupational therapy   Encourage visually impaired, hearing impaired and aphasic patients to use assistive/communication devices  Taken 1/31/2023 1300 by Luana Serrano RN  Achieves maximal functionality and self care: Monitor swallowing and airway patency with patient fatigue and changes in neurological status     Problem: Respiratory - Adult  Goal: Achieves optimal ventilation and oxygenation  2/1/2023 0900 by Letty Franks RN  Outcome: Progressing  2/1/2023 0840 by Josh Scott RCP  Outcome: Progressing  Flowsheets (Taken 2/1/2023 2315 by Letty Franks RN)  Achieves optimal ventilation and oxygenation: Assess the need for suctioning and aspirate as needed  2/1/2023 0231 by Phil Kee RN  Outcome: Progressing  Flowsheets  Taken 1/31/2023 2115 by Phil Kee RN  Achieves optimal ventilation and oxygenation:   Assess for changes in respiratory status   Assess for changes in mentation and behavior   Position to facilitate oxygenation and minimize respiratory effort   Oxygen supplementation based on oxygen saturation or arterial blood gases   Initiate smoking cessation protocol as indicated   Encourage broncho-pulmonary hygiene including cough, deep breathe, incentive spirometry   Assess the need for suctioning and aspirate as needed   Assess and instruct to report shortness of breath or any respiratory difficulty   Respiratory therapy support as indicated  Taken 1/31/2023 1300 by Marlee Abarca RN  Achieves optimal ventilation and oxygenation:   Assess for changes in respiratory status   Assess for changes in mentation and behavior   Position to facilitate oxygenation and minimize respiratory effort   Oxygen supplementation based on oxygen saturation or arterial blood gases   Assess the need for suctioning and aspirate as needed     Problem: Cardiovascular - Adult  Goal: Maintains optimal cardiac output and hemodynamic stability  2/1/2023 0900 by Letty Franks RN  Outcome: Progressing  Flowsheets (Taken 2/1/2023 0821)  Maintains optimal cardiac output and hemodynamic stability: Monitor blood pressure and heart rate  2/1/2023 0231 by Phil Kee RN  Outcome: Progressing  Flowsheets  Taken 1/31/2023 2115 by Phil Kee RN  Maintains optimal cardiac output and hemodynamic stability:   Monitor blood pressure and heart rate   Monitor urine output and notify Licensed Independent Practitioner for values outside of normal range   Assess for signs of decreased cardiac output   Administer fluid and/or volume expanders as ordered  Taken 1/31/2023 1300 by Logan Barry RN  Maintains optimal cardiac output and hemodynamic stability:   Monitor blood pressure and heart rate   Monitor urine output and notify Licensed Independent Practitioner for values outside of normal range   Assess for signs of decreased cardiac output   Administer fluid and/or volume expanders as ordered     Problem: Skin/Tissue Integrity - Adult  Goal: Incisions, wounds, or drain sites healing without S/S of infection  2/1/2023 0900 by Guero Mead RN  Outcome: Progressing  Flowsheets (Taken 2/1/2023 0821)  Incisions, Wounds, or Drain Sites Healing Without Sign and Symptoms of Infection: ADMISSION and DAILY: Assess and document risk factors for pressure ulcer development  2/1/2023 0231 by Sara Matias RN  Outcome: Progressing  Flowsheets  Taken 1/31/2023 2130 by Sara Matias RN  Incisions, Wounds, or Drain Sites Healing Without Sign and Symptoms of Infection:   ADMISSION and DAILY: Assess and document risk factors for pressure ulcer development   TWICE DAILY: Assess and document skin integrity   TWICE DAILY: Assess and document dressing/incision, wound bed, drain sites and surrounding tissue   Implement wound care per orders   Initiate pressure ulcer prevention bundle as indicated  Taken 1/31/2023 1300 by Logan Barry RN  Incisions, Wounds, or Drain Sites Healing Without Sign and Symptoms of Infection: TWICE DAILY: Assess and document skin integrity     Problem: Musculoskeletal - Adult  Goal: Return mobility to safest level of function  2/1/2023 0900 by Guero Mead RN  Outcome: Progressing  Flowsheets (Taken 2/1/2023 0821)  Return Mobility to Safest Level of Function: Assess patient stability and activity tolerance for standing, transferring and ambulating with or without assistive devices  2/1/2023 0231 by Sara Matias RN  Outcome: Progressing  Flowsheets  Taken 1/31/2023 2115 by Areli Warren RN  Return Mobility to Safest Level of Function:   Assess patient stability and activity tolerance for standing, transferring and ambulating with or without assistive devices   Assist with transfers and ambulation using safe patient handling equipment as needed   Ensure adequate protection for wounds/incisions during mobilization   Obtain physical therapy/occupational therapy consults as needed   Instruct patient/family in ordered activity level  Taken 1/31/2023 1300 by Bev Amador RN  Return Mobility to Safest Level of Function:   Assess patient stability and activity tolerance for standing, transferring and ambulating with or without assistive devices   Assist with transfers and ambulation using safe patient handling equipment as needed  Goal: Return ADL status to a safe level of function  2/1/2023 0900 by Linsey Moreno RN  Outcome: Progressing  Flowsheets (Taken 2/1/2023 0821)  Return ADL Status to a Safe Level of Function: Administer medication as ordered  2/1/2023 0231 by Areli Warren RN  Outcome: Progressing  Flowsheets  Taken 1/31/2023 2115 by Areli Warren RN  Return ADL Status to a Safe Level of Function:   Administer medication as ordered   Assess activities of daily living deficits and provide assistive devices as needed   Obtain physical therapy/occupational therapy consults as needed   Assist and instruct patient to increase activity and self care as tolerated  Taken 1/31/2023 1300 by Bev Amador RN  Return ADL Status to a Safe Level of Function:   Administer medication as ordered   Assess activities of daily living deficits and provide assistive devices as needed     Problem: Gastrointestinal - Adult  Goal: Minimal or absence of nausea and vomiting  2/1/2023 0900 by Linsey Moreno RN  Outcome: Progressing  Flowsheets (Taken 2/1/2023 0241)  Minimal or absence of nausea and vomiting: Administer IV fluids as ordered to ensure adequate hydration  2/1/2023 0231 by Emre Kwan RN  Outcome: Progressing  Flowsheets  Taken 1/31/2023 2115 by Emre Kwan RN  Minimal or absence of nausea and vomiting:   Administer IV fluids as ordered to ensure adequate hydration   Administer ordered antiemetic medications as needed   Provide nonpharmacologic comfort measures as appropriate   Advance diet as tolerated, if ordered   Nutrition consult to assist patient with adequate nutrition and appropriate food choices  Taken 1/31/2023 1300 by Kecia Blake RN  Minimal or absence of nausea and vomiting: Administer IV fluids as ordered to ensure adequate hydration     Problem: Genitourinary - Adult  Goal: Absence of urinary retention  2/1/2023 0900 by Maddy Singh RN  Outcome: Progressing  Flowsheets (Taken 2/1/2023 3103)  Absence of urinary retention: Assess patients ability to void and empty bladder  2/1/2023 0231 by Emre Kwan RN  Outcome: Progressing  Flowsheets  Taken 1/31/2023 2115 by Emre Kwan RN  Absence of urinary retention:   Assess patients ability to void and empty bladder   Monitor intake/output and perform bladder scan as needed  Taken 1/31/2023 1300 by Kecia Blake RN  Absence of urinary retention:   Assess patients ability to void and empty bladder   Monitor intake/output and perform bladder scan as needed     Problem: Infection - Adult  Goal: Absence of infection at discharge  2/1/2023 0900 by Maddy Singh RN  Outcome: Progressing  Flowsheets (Taken 2/1/2023 8199)  Absence of infection at discharge: Administer medications as ordered  2/1/2023 0231 by Emre Kwan RN  Outcome: Progressing  Flowsheets  Taken 1/31/2023 2115 by Emre Kwan RN  Absence of infection at discharge:   Assess and monitor for signs and symptoms of infection   Monitor lab/diagnostic results   Monitor all insertion sites i.e., indwelling lines, tubes and drains  Taken 1/31/2023 1300 by Kecia Blake RN  Absence of infection at discharge:   Assess and monitor for signs and symptoms of infection   Monitor lab/diagnostic results   Monitor all insertion sites i.e., indwelling lines, tubes and drains     Problem: Pain  Goal: Verbalizes/displays adequate comfort level or baseline comfort level  2/1/2023 0900 by Guero Mead RN  Outcome: Progressing  2/1/2023 0231 by Sara Matias RN  Outcome: Progressing  Flowsheets (Taken 1/31/2023 2115)  Verbalizes/displays adequate comfort level or baseline comfort level:   Encourage patient to monitor pain and request assistance   Assess pain using appropriate pain scale   Administer analgesics based on type and severity of pain and evaluate response   Implement non-pharmacological measures as appropriate and evaluate response   Consider cultural and social influences on pain and pain management   Notify Licensed Independent Practitioner if interventions unsuccessful or patient reports new pain     Problem: ABCDS Injury Assessment  Goal: Absence of physical injury  2/1/2023 0900 by Guero Mead RN  Outcome: Progressing  2/1/2023 0231 by Sara Matias RN  Outcome: Progressing   Care plan reviewed with patient. Patient verbalize understanding of the plan of care and contribute to goal setting.

## 2023-02-01 NOTE — PLAN OF CARE
ICU handoff received on patient, \"Transfer out of ICU Patient was here yesterday for elective stenting for peripheral arterial disease in her left lower extremity. After the procedure she had left rectus sheath hematoma that required return to interventional suite and coiling. She is on hypotensive brought to the ICU for monitoring which did receive norepinephrine for a few hours however has been off of this since noon today. Stable blood pressures. Ready for transfer to stepdown. \"    Patient stable upon assessment and denies any needs at this time. Agree with plan of care from progress note of same day. Hospitalist team will resume care of patient at this time.

## 2023-02-02 PROBLEM — N14.11 CONTRAST DYE INDUCED NEPHROPATHY: Status: ACTIVE | Noted: 2023-02-02

## 2023-02-02 PROBLEM — E87.20 METABOLIC ACIDOSIS: Status: ACTIVE | Noted: 2023-02-02

## 2023-02-02 PROBLEM — T50.8X5A CONTRAST DYE INDUCED NEPHROPATHY: Status: ACTIVE | Noted: 2023-02-02

## 2023-02-02 PROBLEM — I10 ESSENTIAL HYPERTENSION: Status: ACTIVE | Noted: 2023-02-02

## 2023-02-02 PROBLEM — R60.0 LOCALIZED EDEMA: Status: ACTIVE | Noted: 2023-02-02

## 2023-02-02 PROBLEM — E87.5 HYPERKALEMIA: Status: ACTIVE | Noted: 2023-02-02

## 2023-02-02 PROBLEM — N17.0 ARF (ACUTE RENAL FAILURE) WITH TUBULAR NECROSIS (HCC): Status: ACTIVE | Noted: 2023-02-02

## 2023-02-02 LAB
ANION GAP SERPL CALC-SCNC: 8 MEQ/L (ref 8–16)
BACTERIA SPEC AEROBE CULT: NORMAL
BUN SERPL-MCNC: 18 MG/DL (ref 7–22)
CA-I BLD ISE-SCNC: 1.11 MMOL/L (ref 1.12–1.32)
CALCIUM SERPL-MCNC: 7.7 MG/DL (ref 8.5–10.5)
CHLORIDE SERPL-SCNC: 110 MEQ/L (ref 98–111)
CO2 SERPL-SCNC: 24 MEQ/L (ref 23–33)
CREAT SERPL-MCNC: 0.9 MG/DL (ref 0.4–1.2)
DEPRECATED RDW RBC AUTO: 49.8 FL (ref 35–45)
ERYTHROCYTE [DISTWIDTH] IN BLOOD BY AUTOMATED COUNT: 14.6 % (ref 11.5–14.5)
GFR SERPL CREATININE-BSD FRML MDRD: > 60 ML/MIN/1.73M2
GLUCOSE SERPL-MCNC: 90 MG/DL (ref 70–108)
HCT VFR BLD AUTO: 24.2 % (ref 37–47)
HCT VFR BLD AUTO: 25.5 % (ref 37–47)
HCT VFR BLD AUTO: 26.5 % (ref 37–47)
HGB BLD-MCNC: 7.9 GM/DL (ref 12–16)
HGB BLD-MCNC: 8.3 GM/DL (ref 12–16)
HGB BLD-MCNC: 8.7 GM/DL (ref 12–16)
LV EF: 60 %
LVEF MODALITY: NORMAL
MCH RBC QN AUTO: 30.4 PG (ref 26–33)
MCHC RBC AUTO-ENTMCNC: 32.6 GM/DL (ref 32.2–35.5)
MCV RBC AUTO: 93.1 FL (ref 81–99)
PLATELET # BLD AUTO: 120 THOU/MM3 (ref 130–400)
PMV BLD AUTO: 10.7 FL (ref 9.4–12.4)
POTASSIUM SERPL-SCNC: 4.5 MEQ/L (ref 3.5–5.2)
RBC # BLD AUTO: 2.6 MILL/MM3 (ref 4.2–5.4)
SODIUM SERPL-SCNC: 142 MEQ/L (ref 135–145)
WBC # BLD AUTO: 3.7 THOU/MM3 (ref 4.8–10.8)

## 2023-02-02 PROCEDURE — 36415 COLL VENOUS BLD VENIPUNCTURE: CPT

## 2023-02-02 PROCEDURE — 2580000003 HC RX 258: Performed by: STUDENT IN AN ORGANIZED HEALTH CARE EDUCATION/TRAINING PROGRAM

## 2023-02-02 PROCEDURE — 99232 SBSQ HOSP IP/OBS MODERATE 35: CPT | Performed by: INTERNAL MEDICINE

## 2023-02-02 PROCEDURE — 6370000000 HC RX 637 (ALT 250 FOR IP): Performed by: STUDENT IN AN ORGANIZED HEALTH CARE EDUCATION/TRAINING PROGRAM

## 2023-02-02 PROCEDURE — 2580000003 HC RX 258: Performed by: PHYSICIAN ASSISTANT

## 2023-02-02 PROCEDURE — 85014 HEMATOCRIT: CPT

## 2023-02-02 PROCEDURE — 6370000000 HC RX 637 (ALT 250 FOR IP): Performed by: THORACIC SURGERY (CARDIOTHORACIC VASCULAR SURGERY)

## 2023-02-02 PROCEDURE — 2060000000 HC ICU INTERMEDIATE R&B

## 2023-02-02 PROCEDURE — 94640 AIRWAY INHALATION TREATMENT: CPT

## 2023-02-02 PROCEDURE — APPSS30 APP SPLIT SHARED TIME 16-30 MINUTES: Performed by: PHYSICIAN ASSISTANT

## 2023-02-02 PROCEDURE — 99232 SBSQ HOSP IP/OBS MODERATE 35: CPT | Performed by: STUDENT IN AN ORGANIZED HEALTH CARE EDUCATION/TRAINING PROGRAM

## 2023-02-02 PROCEDURE — 97535 SELF CARE MNGMENT TRAINING: CPT

## 2023-02-02 PROCEDURE — 2700000000 HC OXYGEN THERAPY PER DAY

## 2023-02-02 PROCEDURE — 6360000002 HC RX W HCPCS: Performed by: STUDENT IN AN ORGANIZED HEALTH CARE EDUCATION/TRAINING PROGRAM

## 2023-02-02 PROCEDURE — 80048 BASIC METABOLIC PNL TOTAL CA: CPT

## 2023-02-02 PROCEDURE — 6370000000 HC RX 637 (ALT 250 FOR IP)

## 2023-02-02 PROCEDURE — 85027 COMPLETE CBC AUTOMATED: CPT

## 2023-02-02 PROCEDURE — 85018 HEMOGLOBIN: CPT

## 2023-02-02 PROCEDURE — 2580000003 HC RX 258: Performed by: INTERNAL MEDICINE

## 2023-02-02 PROCEDURE — 93306 TTE W/DOPPLER COMPLETE: CPT

## 2023-02-02 PROCEDURE — 82330 ASSAY OF CALCIUM: CPT

## 2023-02-02 PROCEDURE — 97166 OT EVAL MOD COMPLEX 45 MIN: CPT

## 2023-02-02 PROCEDURE — 2580000003 HC RX 258: Performed by: THORACIC SURGERY (CARDIOTHORACIC VASCULAR SURGERY)

## 2023-02-02 PROCEDURE — 6370000000 HC RX 637 (ALT 250 FOR IP): Performed by: PHYSICIAN ASSISTANT

## 2023-02-02 RX ORDER — CALCIUM GLUCONATE 20 MG/ML
2000 INJECTION, SOLUTION INTRAVENOUS ONCE
Status: COMPLETED | OUTPATIENT
Start: 2023-02-02 | End: 2023-02-02

## 2023-02-02 RX ORDER — LANOLIN ALCOHOL/MO/W.PET/CERES
3 CREAM (GRAM) TOPICAL ONCE
Status: COMPLETED | OUTPATIENT
Start: 2023-02-02 | End: 2023-02-03

## 2023-02-02 RX ORDER — LIDOCAINE 4 G/G
1 PATCH TOPICAL DAILY
Status: DISCONTINUED | OUTPATIENT
Start: 2023-02-02 | End: 2023-02-04 | Stop reason: HOSPADM

## 2023-02-02 RX ADMIN — SODIUM CHLORIDE, PRESERVATIVE FREE 10 ML: 5 INJECTION INTRAVENOUS at 08:31

## 2023-02-02 RX ADMIN — IPRATROPIUM BROMIDE 0.5 MG: 0.5 SOLUTION RESPIRATORY (INHALATION) at 07:48

## 2023-02-02 RX ADMIN — IRON SUCROSE 100 MG: 20 INJECTION, SOLUTION INTRAVENOUS at 19:56

## 2023-02-02 RX ADMIN — SODIUM CHLORIDE, PRESERVATIVE FREE 10 ML: 5 INJECTION INTRAVENOUS at 19:57

## 2023-02-02 RX ADMIN — SODIUM CHLORIDE, PRESERVATIVE FREE 10 ML: 5 INJECTION INTRAVENOUS at 08:33

## 2023-02-02 RX ADMIN — APIXABAN 5 MG: 5 TABLET, FILM COATED ORAL at 20:55

## 2023-02-02 RX ADMIN — CALCIUM GLUCONATE 2000 MG: 20 INJECTION, SOLUTION INTRAVENOUS at 11:30

## 2023-02-02 RX ADMIN — ALBUTEROL SULFATE 2.5 MG: 2.5 SOLUTION RESPIRATORY (INHALATION) at 07:48

## 2023-02-02 RX ADMIN — IPRATROPIUM BROMIDE 0.5 MG: 0.5 SOLUTION RESPIRATORY (INHALATION) at 17:49

## 2023-02-02 RX ADMIN — VERAPAMIL HYDROCHLORIDE 240 MG: 240 TABLET, FILM COATED, EXTENDED RELEASE ORAL at 19:57

## 2023-02-02 RX ADMIN — CALCIUM POLYCARBOPHIL 625 MG: 625 TABLET, FILM COATED ORAL at 08:31

## 2023-02-02 RX ADMIN — SENNOSIDES 8.6 MG: 8.6 TABLET, FILM COATED ORAL at 19:57

## 2023-02-02 RX ADMIN — BUDESONIDE 500 MCG: 0.5 INHALANT RESPIRATORY (INHALATION) at 07:48

## 2023-02-02 RX ADMIN — ACETAMINOPHEN 650 MG: 325 TABLET ORAL at 00:40

## 2023-02-02 RX ADMIN — BUDESONIDE 500 MCG: 0.5 INHALANT RESPIRATORY (INHALATION) at 17:49

## 2023-02-02 RX ADMIN — SODIUM CHLORIDE: 9 INJECTION, SOLUTION INTRAVENOUS at 03:40

## 2023-02-02 RX ADMIN — ALBUTEROL SULFATE 2.5 MG: 2.5 SOLUTION RESPIRATORY (INHALATION) at 17:48

## 2023-02-02 RX ADMIN — CLOPIDOGREL BISULFATE 75 MG: 75 TABLET ORAL at 08:31

## 2023-02-02 RX ADMIN — MONTELUKAST SODIUM 10 MG: 10 TABLET ORAL at 19:57

## 2023-02-02 ASSESSMENT — PAIN DESCRIPTION - DESCRIPTORS: DESCRIPTORS: ACHING

## 2023-02-02 ASSESSMENT — PAIN DESCRIPTION - LOCATION: LOCATION: LEG

## 2023-02-02 ASSESSMENT — PAIN DESCRIPTION - ORIENTATION: ORIENTATION: LEFT

## 2023-02-02 NOTE — PROGRESS NOTES
[facility]   Nephrology progress note  2/2/2023, 10:25 AM      Pt Name:    Dayna Posada  MRN:     [id number]     YOB: 1945  Admit Date:    1/30/2023  9:28 AM    Chief Complaint: Nephrology following for acute kidney injury and contrast-induced nephropathy. Subjective:  Patient seen and examined  No chest pain some shortness of breath but stable  Feels okay. She is only on 2 L    Objective:  24HR INTAKE/OUTPUT:    Intake/Output Summary (Last 24 hours) at 2/2/2023 1025  Last data filed at 2/2/2023 3506  Gross per 24 hour   Intake 825.18 ml   Output 2200 ml   Net -1374.82 ml      Admission weight: 124 lb (56.2 kg)  Wt Readings from Last 3 Encounters:   02/02/23 133 lb 1.6 oz (60.4 kg)   01/16/23 125 lb 3.2 oz (56.8 kg)   09/12/22 121 lb (54.9 kg)        Vitals :   Vitals:    02/01/23 2314 02/02/23 0018 02/02/23 0328 02/02/23 0815   BP: (!) 120/56  117/62 (!) 153/69   Pulse: 64  74 74   Resp: 20  20 20   Temp: 98.2 °F (36.8 °C)  99.2 °F (37.3 °C) 98.7 °F (37.1 °C)   TempSrc: Oral  Oral Oral   SpO2: 96%  98% 94%   Weight:  133 lb 1.6 oz (60.4 kg)     Height:           Physical examination  General Appearance:  Well developed.  No distress  Mouth/Throat:  Oral mucosa moist  Neck:  Supple, no JVD  Lungs:  Breath sounds: clear  Heart[de-identified]  S1,S2 heard  Abdomen:  Soft, non - tender  Musculoskeletal:  Edema -no edema    Medications:  Infusion:    sodium chloride 80 mL/hr at 02/02/23 0340    sodium chloride Stopped (01/30/23 1056)    sodium chloride       Meds:    lidocaine  1 patch TransDERmal Daily    ipratropium  0.5 mg Nebulization BID    And    albuterol  2.5 mg Nebulization BID    [Held by provider] apixaban  2.5 mg Oral BID    senna  1 tablet Oral Nightly    polycarbophil  625 mg Oral Daily    iron sucrose  100 mg IntraVENous Q24H    verapamil  240 mg Oral Nightly    clopidogrel  75 mg Oral Daily    sodium chloride flush  5-40 mL IntraVENous 2 times per day    sodium chloride flush 5-40 mL IntraVENous 2 times per day    ondansetron  4 mg IntraVENous Once    calcium replacement protocol   Other RX Placeholder    budesonide  500 mcg Nebulization BID    montelukast  10 mg Oral Nightly       Lab Data :  CBC:   Recent Labs     01/31/23  0400 01/31/23  0913 01/31/23  1939 02/01/23  0337 02/02/23  0600   WBC 8.5  --   --  6.2 3.7*   HGB 7.7*   < > 9.6* 8.8* 7.9*   HCT 24.3*   < > 28.9* 26.8* 24.2*     --   --  126* 120*    < > = values in this interval not displayed. CMP:  Recent Labs     01/30/23  2119 01/31/23  0550 02/01/23  0337 02/02/23  0600    141 136 142   K 3.2* 5.3* 4.9 4.5   * 107 103 110   CO2 18* 19* 21* 24   BUN 12 19 31* 18   CREATININE 0.4 1.0 2.2* 0.9   GLUCOSE 103 138* 108 90   CALCIUM 5.8* 9.2 7.9* 7.7*   MG 1.0* 3.0*  --   --        Assessment and Plan:  Renal -acute kidney injury due to most likely contrast nephropathy patient has an arteriogram with 4 stents placed and also had a CTA of the abdomen with contrast.  She is currently in the acute phase of ATN   Creatinine much improved with IV fluids we will discontinue IV fluids today  Monitor BMP in the morning  Electrolytes -mild hyperkalemia seems to be doing better  Acidosis stable resolved  Anemia possibly due to rectus sheath hematoma  Peripheral arterial disease status post arteriogram with stent placements on 1/30/2023  Rectus sheath hematoma  Meds reviewed and discussed with patient    Roxy Whitehead MD  [facility]    This report has been created using voice recognition software.  It may contain minor errors which are inherent in voice recognition technology

## 2023-02-02 NOTE — PLAN OF CARE
Problem: Respiratory - Adult  Goal: Achieves optimal ventilation and oxygenation  2/2/2023 1751 by Justin Crabtree RCP  Outcome: Progressing     Problem: Respiratory - Adult  Goal: Clear lung sounds  2/2/2023 1751 by Justin Crabtree RCP  Outcome: Progressing   Patient mutually agreed on goals.

## 2023-02-02 NOTE — RT PROTOCOL NOTE
RT Inhaler-Nebulizer Bronchodilator Protocol Note    There is a bronchodilator order in the chart from a provider indicating to follow the RT Bronchodilator Protocol and there is an Initiate RT Inhaler-Nebulizer Bronchodilator Protocol order as well (see protocol at bottom of note). CXR Findings:  No results found. The findings from the last RT Protocol Assessment were as follows:   History Pulmonary Disease: Chronic pulmonary disease  Respiratory Pattern: Regular pattern and RR 12-20 bpm  Breath Sounds: Slightly diminished and/or crackles  Cough: Strong, spontaneous, non-productive  Indication for Bronchodilator Therapy: Decreased or absent breath sounds, On home bronchodilators  Bronchodilator Assessment Score: 4    Aerosolized bronchodilator medication orders have been revised according to the RT Inhaler-Nebulizer Bronchodilator Protocol below. Respiratory Therapist to perform RT Therapy Protocol Assessment initially then follow the protocol. Repeat RT Therapy Protocol Assessment PRN for score 0-3 or on second treatment, BID, and PRN for scores above 3. No Indications - adjust the frequency to every 6 hours PRN wheezing or bronchospasm, if no treatments needed after 48 hours then discontinue using Per Protocol order mode. If indication present, adjust the RT bronchodilator orders based on the Bronchodilator Assessment Score as indicated below. Use Inhaler orders unless patient has one or more of the following: on home nebulizer, not able to hold breath for 10 seconds, is not alert and oriented, cannot activate and use MDI correctly, or respiratory rate 25 breaths per minute or more, then use the equivalent nebulizer order(s) with same Frequency and PRN reasons based on the score. If a patient is on this medication at home then do not decrease Frequency below that used at home.     0-3 - enter or revise RT bronchodilator order(s) to equivalent RT Bronchodilator order with Frequency of every 4 hours PRN for wheezing or increased work of breathing using Per Protocol order mode. 4-6 - enter or revise RT Bronchodilator order(s) to two equivalent RT bronchodilator orders with one order with BID Frequency and one order with Frequency of every 4 hours PRN wheezing or increased work of breathing using Per Protocol order mode. 7-10 - enter or revise RT Bronchodilator order(s) to two equivalent RT bronchodilator orders with one order with TID Frequency and one order with Frequency of every 4 hours PRN wheezing or increased work of breathing using Per Protocol order mode. 11-13 - enter or revise RT Bronchodilator order(s) to one equivalent RT bronchodilator order with QID Frequency and an Albuterol order with Frequency of every 4 hours PRN wheezing or increased work of breathing using Per Protocol order mode. Greater than 13 - enter or revise RT Bronchodilator order(s) to one equivalent RT bronchodilator order with every 4 hours Frequency and an Albuterol order with Frequency of every 2 hours PRN wheezing or increased work of breathing using Per Protocol order mode. RT to enter RT Home Evaluation for COPD & MDI Assessment order using Per Protocol order mode.     Electronically signed by Janelle Le RCP on 2/2/2023 at 7:52 AM

## 2023-02-02 NOTE — PROGRESS NOTES
[facility] STEPDOWN TELEMETRY 4K  EVALUATION    Time:   Time In: 806  Time Out: 825  Timed Code Treatment Minutes: 10 Minutes  Minutes: 19          Date: 2023  Patient Name: Georges Marks,   Gender: female      MRN: [id number]  : 1945  (69 y.o.)  Referring Practitioner: Paz Sahni PA-C  Diagnosis: Atherosclerosis of native artery of left lower extremity with rest pain  Additional Pertinent Hx: Per H&P: 69year old female with PMHx of Right Breast CA, s/p XRT, COPD on O2 at night, HTN, anxiety disorder, past heavy tobacco use, GERD, and atrial fibrillation on Eliquis presents for evaluation of left foot ulcer and PAD. On , pt had  aortogram, stents to her left superficial femoral artery and popliteal artery, left common iliac artery, left external iliac artery. Post op, she started complaining of abdominal pain noted to be tender and firm on the left side and had a drop in her blood pressure to 90 systolic. She was taken for a emergent CT scan which showed a left rectus sheath hematoma with some contrast in the lower pelvis. She was therefore taken emergently back to interventional radiology with Dr. Phyllis Mays where he reaccessed the right femoral artery, found the source of bleeding in the circumflex artery, placed 3 coils in this with resolution of bleeding. Restrictions/Precautions:  Restrictions/Precautions: General Precautions, Fall Risk    Subjective  Chart Reviewed: Yes, Orders, Progress Notes, History and Physical  Patient assessed for rehabilitation services?: Yes  Family / Caregiver Present: No    Subjective: Pt seated in bed upon arrival, agreeable to OT session. Pt requesting to use bathroom then get up to chair for breakfast.    Pain: Pt c/o pain in LLE although does not quantify. Vitals: Oxygen: 92% on 2L O2 via NC on arrival; Sp02 decreased to 80% with functional mobility to/from bathroom.  Required ~2 minutes once seated for Sp02 to recover to 90%    Social/Functional History:  Lives With: Daughter (and her family; someone home 24/7)  Type of Home: House  Home Layout: One level  Home Access: Stairs to enter with rails  Entrance Stairs - Number of Steps: 3  Home Equipment: Oxygen (2L home O2)   Bathroom Shower/Tub: Tub/Shower unit  Bathroom Toilet: Standard  Bathroom Equipment:  (none)       ADL Assistance: Independent  Homemaking Assistance: Needs assistance (shares IADL tasks with family)  Ambulation Assistance: Independent  Transfer Assistance: Independent    Active : Yes          VISION:WFL    HEARING:  WFL    COGNITION: WFL    RANGE OF MOTION:  Bilateral Upper Extremity:  WFL    STRENGTH:  Bilateral Upper Extremity:  Impaired - grossly deconditioned    ADL:   Grooming: Stand By Assistance. Washing/drying hands while standing at sink  Toileting: Supervision. Toilet Transfer: Supervision. Mona Sharma BALANCE:  Sitting Balance:  Independent. Standing Balance: Stand By Assistance. BED MOBILITY:  Supine to Sit: Modified Independent    Scooting: Modified Independent      TRANSFERS:  Sit to Stand:  Supervision. Stand to Sit: Supervision. FUNCTIONAL MOBILITY:  Assistive Device: Rolling Walker  Assist Level:  Stand By Assistance. Distance: To and from bathroom  Cues for pursed lip breathing required. No LOB. **Discussed DME recommendations for use of RW and shower chair at discharge. Pt initially reports will not need RW, although then stated \"well, I know I need to use it now so I probably will at home for a little while too. \" Discussed locations to purchase shower chair as well. Activity Tolerance:  Patient tolerance of  treatment: good. Assessment:  Assessment: Pt presents requiring increased assistance for ADLs, transfers, and functional mobility compared to PLOF.  Pt will continue to benefit from OT services to improve independence with these tasks, in addition to overall strength/endurance to facilitate return to PLOF. Performance deficits / Impairments: Decreased functional mobility , Decreased ADL status, Decreased endurance, Decreased high-level IADLs  Prognosis: Good  REQUIRES OT FOLLOW-UP: Yes  Decision Making: Medium Complexity    Treatment Initiated: Treatment and education initiated within context of evaluation. Evaluation time included review of current medical information, gathering information related to past medical, social and functional history, completion of standardized testing, formal and informal observation of tasks, assessment of data and development of plan of care and goals. Treatment time included skilled education and facilitation of tasks to increase safety and independence with ADL's for improved functional independence and quality of life. Discharge Recommendations:  Home with assist PRN, Home with Home health OT    Patient Education:     Patient Education  Education Given To: Patient  Education Provided: Role of Therapy, Plan of Care, Equipment (discharge options/recommendations; increasing activity and walking in room/halls with staff 3x/day)  Education Method: Demonstration, Verbal  Barriers to Learning: None  Education Outcome: Demonstrated understanding, Verbalized understanding    Equipment Recommendations: Other: would benefit from RW and shower chair for discharge; CM informed    Plan:  Times Per Week: 3-5x  Current Treatment Recommendations: Strengthening, Balance training, Functional mobility training, Endurance training, Patient/Caregiver education & training, Equipment evaluation, education, & procurement, Self-Care / ADL, Home management training. See long-term goal time frame for expected duration of plan of care. If no long-term goals established, a short length of stay is anticipated.     Goals:     Short Term Goals  Time Frame for Short Term Goals: by discharge  Short Term Goal 1: Pt will increase activity tolerance for functional mobility household distances with supervision and Sp02 >=90% in prep for ADL tasks. Short Term Goal 2: Pt will complete BADL/light IADL tasks with supervision, incorporating ECT prn, to increase independence and ease with self care tasks. Short Term Goal 3: Pt will tolerate standing X5 minutes with supervision in prep for sinkside grooming/showering tasks. Following session, patient left in safe position with all fall risk precautions in place.

## 2023-02-02 NOTE — PLAN OF CARE
Problem: Discharge Planning  Goal: Discharge to home or other facility with appropriate resources  Outcome: Progressing  Flowsheets (Taken 2/2/2023 1037)  Discharge to home or other facility with appropriate resources:   Identify barriers to discharge with patient and caregiver   Arrange for needed discharge resources and transportation as appropriate   Identify discharge learning needs (meds, wound care, etc)   Refer to discharge planning if patient needs post-hospital services based on physician order or complex needs related to functional status, cognitive ability or social support system     Problem: Neurosensory - Adult  Goal: Achieves maximal functionality and self care  Outcome: Progressing  Flowsheets (Taken 2/2/2023 1037)  Achieves maximal functionality and self care:   Monitor swallowing and airway patency with patient fatigue and changes in neurological status   Encourage and assist patient to increase activity and self care with guidance from physical therapy/occupational therapy     Problem: Respiratory - Adult  Goal: Achieves optimal ventilation and oxygenation  Outcome: Progressing  Flowsheets (Taken 2/2/2023 1037)  Achieves optimal ventilation and oxygenation:   Assess for changes in respiratory status   Assess for changes in mentation and behavior   Position to facilitate oxygenation and minimize respiratory effort   Respiratory therapy support as indicated   Assess and instruct to report shortness of breath or any respiratory difficulty   Encourage broncho-pulmonary hygiene including cough, deep breathe, incentive spirometry  Goal: Clear lung sounds  2/2/2023 0754 by Fidel Felix RCP  Outcome: Progressing  Note: Patient agrees to goals     Problem: Cardiovascular - Adult  Goal: Maintains optimal cardiac output and hemodynamic stability  Outcome: Progressing  Flowsheets (Taken 2/2/2023 1037)  Maintains optimal cardiac output and hemodynamic stability:   Monitor blood pressure and heart rate Monitor urine output and notify Licensed Independent Practitioner for values outside of normal range   Assess for signs of decreased cardiac output   Administer fluid and/or volume expanders as ordered     Problem: Skin/Tissue Integrity - Adult  Goal: Incisions, wounds, or drain sites healing without S/S of infection  Outcome: Progressing  Flowsheets (Taken 2/2/2023 1034)  Incisions, Wounds, or Drain Sites Healing Without Sign and Symptoms of Infection:   ADMISSION and DAILY: Assess and document risk factors for pressure ulcer development   TWICE DAILY: Assess and document skin integrity   TWICE DAILY: Assess and document dressing/incision, wound bed, drain sites and surrounding tissue   Initiate pressure ulcer prevention bundle as indicated     Problem: Musculoskeletal - Adult  Goal: Return mobility to safest level of function  Outcome: Progressing  Flowsheets (Taken 2/2/2023 1037)  Return Mobility to Safest Level of Function:   Assess patient stability and activity tolerance for standing, transferring and ambulating with or without assistive devices   Assist with transfers and ambulation using safe patient handling equipment as needed   Instruct patient/family in ordered activity level   Obtain physical therapy/occupational therapy consults as needed  Goal: Return ADL status to a safe level of function  Outcome: Progressing  Flowsheets (Taken 2/2/2023 1037)  Return ADL Status to a Safe Level of Function:   Administer medication as ordered   Assess activities of daily living deficits and provide assistive devices as needed   Obtain physical therapy/occupational therapy consults as needed     Problem: Gastrointestinal - Adult  Goal: Minimal or absence of nausea and vomiting  Outcome: Progressing  Flowsheets (Taken 2/2/2023 1037)  Minimal or absence of nausea and vomiting:   Administer IV fluids as ordered to ensure adequate hydration   Provide nonpharmacologic comfort measures as appropriate   Advance diet as tolerated, if ordered   Nutrition consult to assist patient with adequate nutrition and appropriate food choices     Problem: Genitourinary - Adult  Goal: Absence of urinary retention  Outcome: Progressing  Flowsheets (Taken 2/2/2023 1037)  Absence of urinary retention: Assess patients ability to void and empty bladder     Problem: Infection - Adult  Goal: Absence of infection at discharge  Outcome: Progressing  Flowsheets (Taken 2/2/2023 1037)  Absence of infection at discharge:   Assess and monitor for signs and symptoms of infection   Monitor lab/diagnostic results   Monitor all insertion sites i.e., indwelling lines, tubes and drains   Administer medications as ordered   Instruct and encourage patient and family to use good hand hygiene technique     Problem: Pain  Goal: Verbalizes/displays adequate comfort level or baseline comfort level  Outcome: Progressing  Flowsheets (Taken 2/2/2023 1037)  Verbalizes/displays adequate comfort level or baseline comfort level:   Encourage patient to monitor pain and request assistance   Assess pain using appropriate pain scale   Administer analgesics based on type and severity of pain and evaluate response   Implement non-pharmacological measures as appropriate and evaluate response   Consider cultural and social influences on pain and pain management     Problem: ABCDS Injury Assessment  Goal: Absence of physical injury  Outcome: Progressing  Flowsheets (Taken 2/2/2023 1037)  Absence of Physical Injury: Implement safety measures based on patient assessment   Care plan reviewed with patient. Patient verbalize understanding of the plan of care and contribute to goal setting.

## 2023-02-02 NOTE — RT PROTOCOL NOTE
RT Inhaler-Nebulizer Bronchodilator Protocol Note    There is a bronchodilator order in the chart from a provider indicating to follow the RT Bronchodilator Protocol and there is an Initiate RT Inhaler-Nebulizer Bronchodilator Protocol order as well (see protocol at bottom of note). CXR Findings:  No results found. The findings from the last RT Protocol Assessment were as follows:   History Pulmonary Disease: Chronic pulmonary disease  Respiratory Pattern: Regular pattern and RR 12-20 bpm  Breath Sounds: Slightly diminished and/or crackles  Cough: Strong, spontaneous, non-productive  Indication for Bronchodilator Therapy: Decreased or absent breath sounds, On home bronchodilators  Bronchodilator Assessment Score: 4    Aerosolized bronchodilator medication orders have been revised according to the RT Inhaler-Nebulizer Bronchodilator Protocol below. Respiratory Therapist to perform RT Therapy Protocol Assessment initially then follow the protocol. Repeat RT Therapy Protocol Assessment PRN for score 0-3 or on second treatment, BID, and PRN for scores above 3. No Indications - adjust the frequency to every 6 hours PRN wheezing or bronchospasm, if no treatments needed after 48 hours then discontinue using Per Protocol order mode. If indication present, adjust the RT bronchodilator orders based on the Bronchodilator Assessment Score as indicated below. Use Inhaler orders unless patient has one or more of the following: on home nebulizer, not able to hold breath for 10 seconds, is not alert and oriented, cannot activate and use MDI correctly, or respiratory rate 25 breaths per minute or more, then use the equivalent nebulizer order(s) with same Frequency and PRN reasons based on the score. If a patient is on this medication at home then do not decrease Frequency below that used at home.     0-3 - enter or revise RT bronchodilator order(s) to equivalent RT Bronchodilator order with Frequency of every 4 hours PRN for wheezing or increased work of breathing using Per Protocol order mode. 4-6 - enter or revise RT Bronchodilator order(s) to two equivalent RT bronchodilator orders with one order with BID Frequency and one order with Frequency of every 4 hours PRN wheezing or increased work of breathing using Per Protocol order mode. 7-10 - enter or revise RT Bronchodilator order(s) to two equivalent RT bronchodilator orders with one order with TID Frequency and one order with Frequency of every 4 hours PRN wheezing or increased work of breathing using Per Protocol order mode. 11-13 - enter or revise RT Bronchodilator order(s) to one equivalent RT bronchodilator order with QID Frequency and an Albuterol order with Frequency of every 4 hours PRN wheezing or increased work of breathing using Per Protocol order mode. Greater than 13 - enter or revise RT Bronchodilator order(s) to one equivalent RT bronchodilator order with every 4 hours Frequency and an Albuterol order with Frequency of every 2 hours PRN wheezing or increased work of breathing using Per Protocol order mode. RT to enter RT Home Evaluation for COPD & MDI Assessment order using Per Protocol order mode.     Electronically signed by Marianela Knight RCP on 2/2/2023 at 7:51 AM

## 2023-02-02 NOTE — CARE COORDINATION
2/2/23, 11:50 AM EST    Patient goals/plan/ treatment preferences discussed by  and . Patient goals/plan/ treatment preferences reviewed with patient/ family. Patient/ family verbalize understanding of discharge plan and are in agreement with goal/plan/treatment preferences. Understanding was demonstrated using the teach back method. AVS provided by RN at time of discharge, which includes all necessary medical information pertaining to the patients current course of illness, treatment, post-discharge goals of care, and treatment preferences. Services At/After Discharge: DME  Plans home w daughter Chet Madrigal when medically cleared (H 8.3; monitor, IVF continued, ECHO planned today) likely in am; has [employer] home oxygen 2-4L,nebulizer; client prefers new [facility] for RW after choices offered; collaborated w Attending, patient    Post-acute [facility]) provider list was provided to patient. Patient was informed of their freedom to choose [facility] provider. Discussed and offered to show the patient the relevant [facility] Providers quality and resource use measures on Medicare Compare web site via computer based on patient's goals of care and treatment preferences. Questions regarding selection process were answered.

## 2023-02-02 NOTE — PROGRESS NOTES
Internal Medicine Resident Progress Note    Patient:  Ike Jackson    YOB: 1945  Unit/Bed:-05/005-A  MRN: [id number]    Acct: [de-identified]   PCP: Vince Barroso. Enid Agarwal MD    Date of Admission: 1/30/2023      Assessment/Plan:  Acute Blood Loss Anemia 2/2 Left Rectus Sheath Hematoma s/p Left Lower Extremity Angiogram: Pt had successful left lower extremity angiogram on 1/30 but developed hypotension and was found be be in hemorrhagic shock. CTA abdomen on 1/30 showed left-sided rectus sheath hematoma w/ area of active arterial extravasation. S/p coiling to left superficial femoral circumflex artery w/ 3 mm Vortex coils x2 and 5 mm Vortex coils x 2.   Eliquis 5 mg has been resumed   S/p 2 units FFP and 3 units pRBC   Venofer started on 2/1 for low iron  Daily CBC  Transfuse to keep Hgb > 7  PAD w/ Claudication: s/p stents to left SFA, left popliteal, left common and external iliacs by Melissa Rose MD on 1/30/23. ASA and Plavix were resumed by CT surgery on 2/1  Chronic Hypoxic Respiratory Failure: 2/2 hx of COPD. Uses 2L NC at home. Continue 2.0 L/min on NC (at baseline) to keep SpO2 > 90%  Echo ordered on 2/1 for shortness of breath - complete but results are pending    COPD: No PFTs on file. Pt uses 2L NC at home. 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 Eliezer Ace 2019. Follows with Dr. August Sin. Continue annual mammograms  GERD: Continue home PPI  Anxiety: Takes xanax 0.5 mg TID  Former Tobacco Smoker: 55 pack year smoking hx     JIMMY 2/2 contrast nephropathy - resolved.    Hemorrhagic shock - resolved  Bradycardia (vasovagal) 2/2 emesis on 1/30 - resolved     Expected discharge date:  1-2 days    Disposition:   [x] Home  [] TCU  [] Rehab  [] Psych  [] SNF  [] [facility]  [] Other-    ===================================================================      Chief Complaint: Angiogram procedure     Hospital Course: Rossana Velazco a 69 y.o. female who we are asked to see/evaluate by Dr. Clementina Miller for medical management of hypotension, bradycardia status post rectus sheath hematoma repair. Adam Santiago presented to the hospital today for an elective outpatient left lower extremity angiogram with possible intervention with .  Medical history includes surgically resected right sided breast cancer status post radiation therapy, COPD with 2 L/min continuously and 4 L/min at night. Hypertension, anxiety, prior tobacco user, gastroesophageal reflux disease, atrial fibrillation on apixaban. Had a history of left foot ulcer and peripheral arterial disease and was initially seen by podiatry and then referred for possible intervention to vascular surgery. Today she had aortogram, stents to her left superficial femoral artery and popliteal artery, left common iliac artery, left external iliac artery. Patient tolerated the procedure well was taken back to PACU area. There she started complaining of abdominal pain noted to be tender and firm on the left side and had a drop in her blood pressure to 90 systolic. She was taken for a emergent CT scan which showed a left rectus sheath hematoma with some contrast in the lower pelvis. She was therefore taken emergently back to interventional radiology with Dr. Clementina Miller where he reaccessed the right femoral artery, found the source of bleeding in the circumflex artery, placed 3 coils in this with resolution of bleeding. Was noted to be hypotensive and bradycardic both in PACU and during the procedure, received a total of 2.5 L of crystalloid and arrived in the ICU with 1 unit of PRBC running.  Patient states she would like to avoid further blood transfusions as the blood she might receive might be from somebody who had received a vaccine against COVID-19. She is worried about receiving blood from somebody who had gotten a vaccine against COVID-19. She is agreeable to additional blood transfusions if necessary. On arrival to the ICU patient complained of nausea and had one episode of emesis, this improved after given ondansetron. During the episode of emesis she bradycardia down to the 40s.,  This resolved when she stopped vomiting. \"     2/1/23: ASA, Eliquis, and Plavix have been resumed by CT surgery. Nephro consulted for JIMMY. PT/OT consulted. 2/2/23: Eliquis 2.5 mg held for Hgb 7.9. Full diet started. JIMMY has resolved today. Echo is pending. 2/3/23: Eliquis 5 mg has been resumed. Discharge tomorrow. Subjective (past 24 hours):   Pt seen at bedside. No new complaints. Hematoma area in left groin was examined and their is some     ROS: reviewed complete ROS unchanged unless otherwise stated in hospital course/subjective portion.        Medications:  Reviewed    Infusion Medications    sodium chloride 80 mL/hr at 02/02/23 0340    sodium chloride Stopped (01/30/23 1056)    sodium chloride       Scheduled Medications    ipratropium  0.5 mg Nebulization BID    And    albuterol  2.5 mg Nebulization BID    [Held by provider] apixaban  2.5 mg Oral BID    senna  1 tablet Oral Nightly    polycarbophil  625 mg Oral Daily    iron sucrose  100 mg IntraVENous Q24H    verapamil  240 mg Oral Nightly    clopidogrel  75 mg Oral Daily    sodium chloride flush  5-40 mL IntraVENous 2 times per day    sodium chloride flush  5-40 mL IntraVENous 2 times per day    ondansetron  4 mg IntraVENous Once    calcium replacement protocol   Other RX Placeholder    budesonide  500 mcg Nebulization BID    montelukast  10 mg Oral Nightly     PRN Meds: sodium chloride flush, sodium chloride, sodium chloride flush, ondansetron **OR** ondansetron, acetaminophen, oxyCODONE **OR** oxyCODONE, sodium chloride, albuterol sulfate HFA, potassium chloride **OR** potassium alternative oral replacement **OR** potassium chloride, magnesium sulfate        Intake/Output Summary (Last 24 hours) at 2/2/2023 0732  Last data filed at 2/2/2023 0505  Gross per 24 hour   Intake 835.18 ml   Output 2200 ml   Net -1364.82 ml       Exam:  /62   Pulse 74   Temp 99.2 °F (37.3 °C) (Oral)   Resp 20   Ht 5' 2\" (1.575 m)   Wt 133 lb 1.6 oz (60.4 kg)   SpO2 98%   BMI 24.34 kg/m²     General: No distress, appears stated age. Eyes:  PERRL. Conjunctivae/corneas clear. HENT: Head normal appearing. Nares normal. Oral mucosa moist.  Hearing intact. Neck: Supple, with full range of motion. Trachea midline. No gross JVD appreciated. Respiratory:  Normal effort. Clear to auscultation, without rales or wheezes or rhonchi. Cardiovascular: Normal rate, regular rhythm with normal S1/S2 without murmurs. No lower extremity edema. Abdomen: Tender abdomen on palpitation, abdominal binder in place  Musculoskeletal: No joint swelling or tenderness. Normal tone. No abnormal movements. Skin: Warm and dry. No rashes or lesions. Neurologic:  No focal sensory/motor deficits in the upper or lower extremities. Cranial nerves:  grossly non-focal 2-12. Psychiatric: Alert and oriented, normal insight and thought content. Capillary Refill: Brisk,< 3 seconds. Peripheral Pulses: +2 palpable, equal bilaterally. Labs:   Recent Labs     01/31/23  0400 01/31/23  0913 01/31/23  1939 02/01/23  0337 02/02/23  0600   WBC 8.5  --   --  6.2 3.7*   HGB 7.7*   < > 9.6* 8.8* 7.9*   HCT 24.3*   < > 28.9* 26.8* 24.2*     --   --  126* 120*    < > = values in this interval not displayed. Recent Labs     01/31/23  0550 02/01/23  0337 02/02/23  0600    136 142   K 5.3* 4.9 4.5    103 110   CO2 19* 21* 24   BUN 19 31* 18   CREATININE 1.0 2.2* 0.9   CALCIUM 9.2 7.9* 7.7*     No results for input(s): AST, ALT, BILIDIR, BILITOT, ALKPHOS in the last 72 hours.   Recent Labs 01/30/23  1000 01/30/23  2119   INR 1.04 1.33*     No results for input(s): TROPONINT in the last 72 hours. No results for input(s): PROCAL in the last 72 hours. No results found for: Doris Vizcarra, BACTERIA, RBCUA, BLOODU, [address], [address]    Radiology (48 hours):  No results found. DVT prophylaxis:    [] Lovenox  [] SCDs  [] SQ Heparin  [] Encourage ambulation   [x] Already on Anticoagulation       Diet: ADULT DIET;  Regular  Code Status: Full Code  PT/OT: Ordered  Tele: Continuous   IVF: none    Electronically signed by Hal Kennedy DO PGY-1 on 2/2/2023 at 7:32 AM    Case was discussed with Attending, Dr. Cristina Baires MD.

## 2023-02-02 NOTE — PROGRESS NOTES
CT/CV Surgery Progress Note    2023 7:14 AM  Surgeon:  Dr. Dee Mart:  Ms. Nic Mendiola is resting comfortably in bed on , alert, and in no acute distress. Pt denies chest pressure, SOB, fever,chills, N/V/D and abdominal discomfort is improving. EKG-afib. Hgb 7.9  Eliquis started yesterday is currently on hold. Cr pending     S/p USG guidance, Right Common Femoral artery  Right Common Femoral angiogram  Selective Left Hypogastric artery angiogram  Selective Left Superficial Femoral Circumflex artery angiogram  Coil embolization of Left Superficial Femoral Circumflex artery, descending branch, with 3 mm Vortx coils x2 and 5 mm Vortx coils x 2  POD # 3    Vital Signs: /62   Pulse 74   Temp 99.2 °F (37.3 °C) (Oral)   Resp 20   Ht 5' 2\" (1.575 m)   Wt 133 lb 1.6 oz (60.4 kg)   SpO2 98%   BMI 24.34 kg/m²    Temp (24hrs), Av.5 °F (36.9 °C), Min:97.6 °F (36.4 °C), Max:99.2 °F (37.3 °C)       Labs:   CBC:  Recent Labs     23  1000 23  1805 23  2119 23  0400 23  0913 23  1939 23  0337 23  0600   WBC  --   --  8.8 8.5  --   --  6.2 3.7*   HGB  --    < > 9.5* 7.7*   < > 9.6* 8.8* 7.9*   HCT  --    < > 30.1* 24.3*   < > 28.9* 26.8* 24.2*   MCV  --   --  102.4* 102.1*  --   --  92.1 93.1   PLT  --   --  193 225  --   --  126* 120*   APTT 38.4*  --  112.8*  --   --   --   --   --    INR 1.04  --  1.33*  --   --   --   --   --     < > = values in this interval not displayed.        BMP:   Recent Labs     23  2119 23  0550 23  0337    141 136   K 3.2* 5.3* 4.9   * 107 103   CO2 18* 19* 21*   BUN 12 19 31*   CREATININE 0.4 1.0 2.2*   MG 1.0* 3.0*  --        Last HgA1C: No results found for: LABA1C        Intake/Output Summary (Last 24 hours) at 2023 0714  Last data filed at 2023 3121  Gross per 24 hour   Intake 835.18 ml   Output 2200 ml   Net -1364.82 ml         Scheduled Meds:    ipratropium  0.5 mg Nebulization BID    And    albuterol  2.5 mg Nebulization BID    apixaban  2.5 mg Oral BID    senna  1 tablet Oral Nightly    polycarbophil  625 mg Oral Daily    iron sucrose  100 mg IntraVENous Q24H    verapamil  240 mg Oral Nightly    clopidogrel  75 mg Oral Daily    sodium chloride flush  5-40 mL IntraVENous 2 times per day    sodium chloride flush  5-40 mL IntraVENous 2 times per day    ondansetron  4 mg IntraVENous Once    calcium replacement protocol   Other RX Placeholder    budesonide  500 mcg Nebulization BID    montelukast  10 mg Oral Nightly       ROS: All neg unless specifically mentioned in subjective section. Exam:  General Appearance: alert ,conversing, in no acute distress  Cardiovascular: IIRR, no murmurs, rubs, clicks, or gallops  Pulmonary/Chest: clear to auscultation bilaterally- no wheezes, rales or rhonchi, normal air movement, no respiratory distress  Neurological: alert, oriented, normal speech, no focal findings or movement disorder noted  Bilateral Groins: R groin dressing changed, no thrill. Mild distention LLQ of abdomen continues to improve  Bilateral LEs: Feet warm to touch. DPs and PTs detected on doppler. Left ankle dressing removed. 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 Nephrology consult and recommendations appreciated  Encourage PT/OT. May advance to full regular diet from CV surgery standpoint.     The plan of care was discussed in detail with Dr. Simran Angulo PA-C

## 2023-02-03 LAB
ANION GAP SERPL CALC-SCNC: 8 MEQ/L (ref 8–16)
BUN SERPL-MCNC: 8 MG/DL (ref 7–22)
CALCIUM SERPL-MCNC: 8.1 MG/DL (ref 8.5–10.5)
CHLORIDE SERPL-SCNC: 107 MEQ/L (ref 98–111)
CO2 SERPL-SCNC: 28 MEQ/L (ref 23–33)
CREAT SERPL-MCNC: 0.8 MG/DL (ref 0.4–1.2)
DEPRECATED RDW RBC AUTO: 48.8 FL (ref 35–45)
EKG Q-T INTERVAL: 420 MS
EKG QRS DURATION: 80 MS
EKG QTC CALCULATION (BAZETT): 379 MS
EKG R AXIS: 83 DEGREES
EKG T AXIS: 73 DEGREES
EKG VENTRICULAR RATE: 49 BPM
ERYTHROCYTE [DISTWIDTH] IN BLOOD BY AUTOMATED COUNT: 14.3 % (ref 11.5–14.5)
GFR SERPL CREATININE-BSD FRML MDRD: > 60 ML/MIN/1.73M2
GLUCOSE SERPL-MCNC: 102 MG/DL (ref 70–108)
HCT VFR BLD AUTO: 25.7 % (ref 37–47)
HGB BLD-MCNC: 8.3 GM/DL (ref 12–16)
MCH RBC QN AUTO: 30 PG (ref 26–33)
MCHC RBC AUTO-ENTMCNC: 32.3 GM/DL (ref 32.2–35.5)
MCV RBC AUTO: 92.8 FL (ref 81–99)
PLATELET # BLD AUTO: 154 THOU/MM3 (ref 130–400)
PMV BLD AUTO: 10.7 FL (ref 9.4–12.4)
POTASSIUM SERPL-SCNC: 4 MEQ/L (ref 3.5–5.2)
RBC # BLD AUTO: 2.77 MILL/MM3 (ref 4.2–5.4)
SODIUM SERPL-SCNC: 143 MEQ/L (ref 135–145)
T4 FREE SERPL-MCNC: 1.36 NG/DL (ref 0.93–1.76)
TSH SERPL DL<=0.005 MIU/L-ACNC: 8.36 UIU/ML (ref 0.4–4.2)
WBC # BLD AUTO: 4.2 THOU/MM3 (ref 4.8–10.8)

## 2023-02-03 PROCEDURE — 6360000002 HC RX W HCPCS: Performed by: STUDENT IN AN ORGANIZED HEALTH CARE EDUCATION/TRAINING PROGRAM

## 2023-02-03 PROCEDURE — 93010 ELECTROCARDIOGRAM REPORT: CPT | Performed by: INTERNAL MEDICINE

## 2023-02-03 PROCEDURE — 6370000000 HC RX 637 (ALT 250 FOR IP)

## 2023-02-03 PROCEDURE — 94640 AIRWAY INHALATION TREATMENT: CPT

## 2023-02-03 PROCEDURE — 97530 THERAPEUTIC ACTIVITIES: CPT

## 2023-02-03 PROCEDURE — 97116 GAIT TRAINING THERAPY: CPT

## 2023-02-03 PROCEDURE — 85027 COMPLETE CBC AUTOMATED: CPT

## 2023-02-03 PROCEDURE — 2580000003 HC RX 258: Performed by: THORACIC SURGERY (CARDIOTHORACIC VASCULAR SURGERY)

## 2023-02-03 PROCEDURE — 36415 COLL VENOUS BLD VENIPUNCTURE: CPT

## 2023-02-03 PROCEDURE — 80048 BASIC METABOLIC PNL TOTAL CA: CPT

## 2023-02-03 PROCEDURE — 84439 ASSAY OF FREE THYROXINE: CPT

## 2023-02-03 PROCEDURE — 97162 PT EVAL MOD COMPLEX 30 MIN: CPT

## 2023-02-03 PROCEDURE — 6370000000 HC RX 637 (ALT 250 FOR IP): Performed by: PHYSICIAN ASSISTANT

## 2023-02-03 PROCEDURE — 99232 SBSQ HOSP IP/OBS MODERATE 35: CPT | Performed by: INTERNAL MEDICINE

## 2023-02-03 PROCEDURE — 6370000000 HC RX 637 (ALT 250 FOR IP): Performed by: STUDENT IN AN ORGANIZED HEALTH CARE EDUCATION/TRAINING PROGRAM

## 2023-02-03 PROCEDURE — 93005 ELECTROCARDIOGRAM TRACING: CPT | Performed by: STUDENT IN AN ORGANIZED HEALTH CARE EDUCATION/TRAINING PROGRAM

## 2023-02-03 PROCEDURE — 2060000000 HC ICU INTERMEDIATE R&B

## 2023-02-03 PROCEDURE — 2580000003 HC RX 258: Performed by: PHYSICIAN ASSISTANT

## 2023-02-03 PROCEDURE — 6370000000 HC RX 637 (ALT 250 FOR IP): Performed by: THORACIC SURGERY (CARDIOTHORACIC VASCULAR SURGERY)

## 2023-02-03 PROCEDURE — 97110 THERAPEUTIC EXERCISES: CPT

## 2023-02-03 PROCEDURE — APPSS30 APP SPLIT SHARED TIME 16-30 MINUTES: Performed by: PHYSICIAN ASSISTANT

## 2023-02-03 PROCEDURE — 2580000003 HC RX 258: Performed by: STUDENT IN AN ORGANIZED HEALTH CARE EDUCATION/TRAINING PROGRAM

## 2023-02-03 PROCEDURE — 84443 ASSAY THYROID STIM HORMONE: CPT

## 2023-02-03 RX ORDER — LEVOTHYROXINE SODIUM 0.07 MG/1
37.5 TABLET ORAL DAILY
Status: DISCONTINUED | OUTPATIENT
Start: 2023-02-03 | End: 2023-02-04 | Stop reason: HOSPADM

## 2023-02-03 RX ORDER — LEVOTHYROXINE SODIUM 0.05 MG/1
50 TABLET ORAL DAILY
Status: DISCONTINUED | OUTPATIENT
Start: 2023-02-03 | End: 2023-02-03

## 2023-02-03 RX ORDER — LANOLIN ALCOHOL/MO/W.PET/CERES
3 CREAM (GRAM) TOPICAL NIGHTLY PRN
Status: DISCONTINUED | OUTPATIENT
Start: 2023-02-03 | End: 2023-02-04 | Stop reason: HOSPADM

## 2023-02-03 RX ADMIN — SODIUM CHLORIDE, PRESERVATIVE FREE 10 ML: 5 INJECTION INTRAVENOUS at 08:54

## 2023-02-03 RX ADMIN — BUDESONIDE 500 MCG: 0.5 INHALANT RESPIRATORY (INHALATION) at 06:34

## 2023-02-03 RX ADMIN — ALBUTEROL SULFATE 2.5 MG: 2.5 SOLUTION RESPIRATORY (INHALATION) at 17:04

## 2023-02-03 RX ADMIN — APIXABAN 5 MG: 5 TABLET, FILM COATED ORAL at 08:46

## 2023-02-03 RX ADMIN — SODIUM CHLORIDE, PRESERVATIVE FREE 10 ML: 5 INJECTION INTRAVENOUS at 21:08

## 2023-02-03 RX ADMIN — IRON SUCROSE 100 MG: 20 INJECTION, SOLUTION INTRAVENOUS at 21:26

## 2023-02-03 RX ADMIN — Medication 3 MG: at 21:02

## 2023-02-03 RX ADMIN — IPRATROPIUM BROMIDE 0.5 MG: 0.5 SOLUTION RESPIRATORY (INHALATION) at 17:04

## 2023-02-03 RX ADMIN — SENNOSIDES 8.6 MG: 8.6 TABLET, FILM COATED ORAL at 21:02

## 2023-02-03 RX ADMIN — SODIUM CHLORIDE, PRESERVATIVE FREE 10 ML: 5 INJECTION INTRAVENOUS at 08:53

## 2023-02-03 RX ADMIN — ALBUTEROL SULFATE 2.5 MG: 2.5 SOLUTION RESPIRATORY (INHALATION) at 06:35

## 2023-02-03 RX ADMIN — CLOPIDOGREL BISULFATE 75 MG: 75 TABLET ORAL at 08:47

## 2023-02-03 RX ADMIN — ACETAMINOPHEN 650 MG: 325 TABLET ORAL at 08:47

## 2023-02-03 RX ADMIN — APIXABAN 5 MG: 5 TABLET, FILM COATED ORAL at 21:01

## 2023-02-03 RX ADMIN — ONDANSETRON 4 MG: 4 TABLET, ORALLY DISINTEGRATING ORAL at 08:46

## 2023-02-03 RX ADMIN — MONTELUKAST SODIUM 10 MG: 10 TABLET ORAL at 21:02

## 2023-02-03 RX ADMIN — SODIUM CHLORIDE, PRESERVATIVE FREE 10 ML: 5 INJECTION INTRAVENOUS at 21:03

## 2023-02-03 RX ADMIN — CALCIUM POLYCARBOPHIL 625 MG: 625 TABLET, FILM COATED ORAL at 08:46

## 2023-02-03 RX ADMIN — IPRATROPIUM BROMIDE 0.5 MG: 0.5 SOLUTION RESPIRATORY (INHALATION) at 06:35

## 2023-02-03 RX ADMIN — Medication 3 MG: at 01:30

## 2023-02-03 RX ADMIN — BUDESONIDE 500 MCG: 0.5 INHALANT RESPIRATORY (INHALATION) at 17:04

## 2023-02-03 ASSESSMENT — PAIN DESCRIPTION - PAIN TYPE
TYPE: ACUTE PAIN
TYPE: CHRONIC PAIN
TYPE: ACUTE PAIN

## 2023-02-03 ASSESSMENT — PAIN DESCRIPTION - LOCATION
LOCATION: HEAD
LOCATION: HEAD;GENERALIZED

## 2023-02-03 ASSESSMENT — PAIN SCALES - GENERAL
PAINLEVEL_OUTOF10: 5
PAINLEVEL_OUTOF10: 3
PAINLEVEL_OUTOF10: 5
PAINLEVEL_OUTOF10: 3

## 2023-02-03 ASSESSMENT — PAIN - FUNCTIONAL ASSESSMENT
PAIN_FUNCTIONAL_ASSESSMENT: ACTIVITIES ARE NOT PREVENTED

## 2023-02-03 ASSESSMENT — PAIN DESCRIPTION - ORIENTATION
ORIENTATION: MID;LOWER
ORIENTATION: POSTERIOR

## 2023-02-03 ASSESSMENT — PAIN DESCRIPTION - DESCRIPTORS
DESCRIPTORS: ACHING

## 2023-02-03 ASSESSMENT — PAIN DESCRIPTION - FREQUENCY
FREQUENCY: INTERMITTENT

## 2023-02-03 ASSESSMENT — PAIN DESCRIPTION - ONSET
ONSET: GRADUAL
ONSET: ON-GOING

## 2023-02-03 NOTE — CARE COORDINATION
2/3/23, 7:23 AM EST    Patient goals/plan/ treatment preferences discussed by  and . Patient goals/plan/ treatment preferences reviewed with patient/ family. Patient/ family verbalize understanding of discharge plan and are in agreement with goal/plan/treatment preferences. Understanding was demonstrated using the teach back method. AVS provided by RN at time of discharge, which includes all necessary medical information pertaining to the patients current course of illness, treatment, post-discharge goals of care, and treatment preferences. Services At/After Discharge: DME  Plans home w daughter Fernanda Webster when medically cleared (H 8.3; IV Iron; monitor); has [employer] home oxygen 2-4L,nebulizer; client prefers new [facility] for RW after choices offered; ambulates 60 feet w therapy; collaborated w Attending, patient, Fiona Messina RN, Christal Medina, [address] [facility] Liaison     Post-acute [facility]) provider list was provided to patient. Patient was informed of their freedom to choose [facility] provider. Discussed and offered to show the patient the relevant [facility] Providers quality and resource use measures on Medicare Compare web site via computer based on patient's goals of care and treatment preferences. Questions regarding selection process were answered.

## 2023-02-03 NOTE — PLAN OF CARE
Problem: Discharge Planning  Goal: Discharge to home or other facility with appropriate resources  Outcome: Progressing  Flowsheets (Taken 2/3/2023 2689)  Discharge to home or other facility with appropriate resources:   Identify barriers to discharge with patient and caregiver   Arrange for needed discharge resources and transportation as appropriate   Identify discharge learning needs (meds, wound care, etc)   Refer to discharge planning if patient needs post-hospital services based on physician order or complex needs related to functional status, cognitive ability or social support system     Problem: Neurosensory - Adult  Goal: Achieves maximal functionality and self care  Outcome: Progressing  Flowsheets (Taken 2/3/2023 9706)  Achieves maximal functionality and self care:   Monitor swallowing and airway patency with patient fatigue and changes in neurological status   Encourage and assist patient to increase activity and self care with guidance from physical therapy/occupational therapy     Problem: Respiratory - Adult  Goal: Achieves optimal ventilation and oxygenation  2/3/2023 6203 by Luzma Pa RN  Outcome: Progressing  Flowsheets (Taken 2/3/2023 0859)  Achieves optimal ventilation and oxygenation:   Assess for changes in respiratory status   Assess for changes in mentation and behavior   Position to facilitate oxygenation and minimize respiratory effort   Oxygen supplementation based on oxygen saturation or arterial blood gases   Assess and instruct to report shortness of breath or any respiratory difficulty   Respiratory therapy support as indicated   Encourage broncho-pulmonary hygiene including cough, deep breathe, incentive spirometry     Problem: Cardiovascular - Adult  Goal: Maintains optimal cardiac output and hemodynamic stability  Outcome: Progressing  Flowsheets (Taken 2/3/2023 1860)  Maintains optimal cardiac output and hemodynamic stability:   Monitor blood pressure and heart rate Monitor urine output and notify Licensed Independent Practitioner for values outside of normal range   Assess for signs of decreased cardiac output     Problem: Skin/Tissue Integrity - Adult  Goal: Incisions, wounds, or drain sites healing without S/S of infection  Outcome: Progressing  Flowsheets (Taken 2/3/2023 0773)  Incisions, Wounds, or Drain Sites Healing Without Sign and Symptoms of Infection:   ADMISSION and DAILY: Assess and document risk factors for pressure ulcer development   TWICE DAILY: Assess and document skin integrity     Problem: Musculoskeletal - Adult  Goal: Return mobility to safest level of function  Outcome: Progressing  Flowsheets (Taken 2/3/2023 6388)  Return Mobility to Safest Level of Function:   Assess patient stability and activity tolerance for standing, transferring and ambulating with or without assistive devices   Assist with transfers and ambulation using safe patient handling equipment as needed   Ensure adequate protection for wounds/incisions during mobilization   Instruct patient/family in ordered activity level     Problem: Musculoskeletal - Adult  Goal: Return ADL status to a safe level of function  Outcome: Progressing  Flowsheets (Taken 2/3/2023 4540)  Return ADL Status to a Safe Level of Function:   Administer medication as ordered   Assess activities of daily living deficits and provide assistive devices as needed   Assist and instruct patient to increase activity and self care as tolerated     Problem: Gastrointestinal - Adult  Goal: Minimal or absence of nausea and vomiting  Outcome: Progressing  Flowsheets (Taken 2/3/2023 9462)  Minimal or absence of nausea and vomiting:   Administer IV fluids as ordered to ensure adequate hydration   Administer ordered antiemetic medications as needed   Advance diet as tolerated, if ordered     Problem: Genitourinary - Adult  Goal: Absence of urinary retention  Outcome: Progressing  Flowsheets (Taken 2/3/2023 0610)  Absence of urinary retention: Assess patients ability to void and empty bladder     Problem: Infection - Adult  Goal: Absence of infection at discharge  Outcome: Progressing  Flowsheets (Taken 2/3/2023 5526)  Absence of infection at discharge:   Assess and monitor for signs and symptoms of infection   Monitor lab/diagnostic results   Monitor all insertion sites i.e., indwelling lines, tubes and drains   Administer medications as ordered   Instruct and encourage patient and family to use good hand hygiene technique   Identify and instruct in appropriate isolation precautions for identified infection/condition     Problem: Pain  Goal: Verbalizes/displays adequate comfort level or baseline comfort level  Outcome: Progressing  Flowsheets (Taken 2/3/2023 0632)  Verbalizes/displays adequate comfort level or baseline comfort level:   Encourage patient to monitor pain and request assistance   Assess pain using appropriate pain scale   Administer analgesics based on type and severity of pain and evaluate response   Implement non-pharmacological measures as appropriate and evaluate response   Consider cultural and social influences on pain and pain management   Notify Licensed Independent Practitioner if interventions unsuccessful or patient reports new pain     Problem: ABCDS Injury Assessment  Goal: Absence of physical injury  Outcome: Progressing  Flowsheets (Taken 2/3/2023 1265)  Absence of Physical Injury: Implement safety measures based on patient assessment   Care plan reviewed with patient. Patient verbalized understanding of the plan of care and contribute to goal setting.

## 2023-02-03 NOTE — PROGRESS NOTES
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 On , pt had  aortogram, stents to her left superficial femoral artery and popliteal artery, left common iliac artery, left external iliac artery. Post op, she started complaining of abdominal pain noted to be tender and firm on the left side and had a drop in her blood pressure to 90 systolic. She was taken for a emergent CT scan which showed a left rectus sheath hematoma with some contrast in the lower pelvis. She was therefore taken emergently back to interventional radiology with Dr. Callie Kurtz where he reaccessed the right femoral artery, found the source of bleeding in the circumflex artery, placed 3 coils in this with resolution of bleeding. Restrictions/Precautions:  Restrictions/Precautions: General Precautions, Fall Risk     SUBJECTIVE: Pt resting in bed upon arrival, agreeable to OT session. PAIN: No c/o. Vitals: Oxygen: Patient on 2 L O2 via Nasal Canula  upon arrival to room. Patient O2 sats at 97%. Upon activity patient maintaining O2 at 93%. Pt requiring mod rest break(s) to recover. Heart Rate: WNL    COGNITION: WFL    ADL:   No ADL's completed this session. Pt declined . BALANCE:  Sitting Balance:  Supervision. EOB  Standing Balance: Stand By Assistance.    X1 minute in prep for mobility    BED MOBILITY:  Supine to Sit: Supervision    Sit to Supine: Supervision    Scooting: Modified Independent EOB    TRANSFERS:  Sit to Stand:  Stand By Assistance. Stand to Sit: Stand By Assistance. FUNCTIONAL MOBILITY:  Assistive Device: Rolling Walker   Assist Level:  Stand By Assistance. Distance:   Completed functional mobility short household distance within unit hallway at slow pace, no LOB noted. Pt requires extended seated rest break after trial of mobility, mod fatigue noted. ADDITIONAL ACTIVITIES:  Patient identified a personal goal to increase UB strength and improve overall endurance so they can complete their toilet & shower transfers; skilled edu on UE strengthening. Completed BUE exercises x10 reps x1 sets using min resistance band in all joints/planes to increase strength and endurance required for ADLs. Pt required mod rest break between each exercise and min v/c for proper technique. ASSESSMENT:     Activity Tolerance:  Patient tolerance of  treatment: fair. Discharge Recommendations: Continue to assess pending progress and Home with Home Health OT  Equipment Recommendations: Other: would benefit from RW and shower chair for discharge; CM informed  Plan: Times Per Week: 3-5x  Current Treatment Recommendations: Strengthening, Balance training, Functional mobility training, Endurance training, Patient/Caregiver education & training, Equipment evaluation, education, & procurement, Self-Care / ADL, Home management training    Patient Education  Patient Education: Energy Conservation, Home Exercise Program, Importance of Increasing Activity, Assistive Device Safety, and Safety with transfers and mobility. Goals  Short Term Goals  Time Frame for Short Term Goals: by discharge  Short Term Goal 1: Pt will increase activity tolerance for functional mobility household distances with supervision and Sp02 >=90% in prep for ADL tasks.   Short Term Goal 2: Pt will complete BADL/light IADL tasks with supervision, incorporating ECT prn, to increase independence and ease with self care tasks. Short Term Goal 3: Pt will tolerate standing X5 minutes with supervision in prep for sinkside grooming/showering tasks. Following session, patient left in safe position with all fall risk precautions in place.

## 2023-02-03 NOTE — FLOWSHEET NOTE
Virtual RN rounds completed. Pt did not respond, camera turned on for safety, pt lying in bed resting, call light in reach.

## 2023-02-03 NOTE — PROGRESS NOTES
CT/CV Surgery Progress Note    2/3/2023 7:35 AM  Surgeon:  Dr. Case Core:  Ms. Ben Flores is resting comfortably in bed on , alert, and in no acute distress. Pt denies chest pressure, SOB, fever,chills, N/V/D and abdominal discomfort is improving. EKG-afib. Hgb stable this morning 8.3  Eliquis restarted yesterday.   Cr 0.8     S/p USG guidance, Right Common Femoral artery  Right Common Femoral angiogram  Selective Left Hypogastric artery angiogram  Selective Left Superficial Femoral Circumflex artery angiogram  Coil embolization of Left Superficial Femoral Circumflex artery, descending branch, with 3 mm Vortx coils x2 and 5 mm Vortx coils x 2  POD # 4    Vital Signs: BP (!) 96/54   Pulse 52   Temp 98.4 °F (36.9 °C) (Oral)   Resp 18   Ht 5' 2\" (1.575 m)   Wt 131 lb 9.6 oz (59.7 kg)   SpO2 96%   BMI 24.07 kg/m²    Temp (24hrs), Av.8 °F (37.1 °C), Min:98.4 °F (36.9 °C), Max:99.1 °F (37.3 °C)       Labs:   CBC:  Recent Labs     23  0623  11023  0346   WBC 6.2 3.7*  --   --  4.2*   HGB 8.8* 7.9* 8.3* 8.7* 8.3*   HCT 26.8* 24.2* 25.5* 26.5* 25.7*   MCV 92.1 93.1  --   --  92.8   * 120*  --   --  154       BMP:   Recent Labs     23  0623  0346    142 143   K 4.9 4.5 4.0    110 107   CO2 21* 24 28   BUN 31* 18 8   CREATININE 2.2* 0.9 0.8       Last HgA1C: No results found for: LABA1C        Intake/Output Summary (Last 24 hours) at 2/3/2023 0735  Last data filed at 2/3/2023 0631  Gross per 24 hour   Intake 1687.05 ml   Output 2100 ml   Net -412.95 ml         Scheduled Meds:    lidocaine  1 patch TransDERmal Daily    apixaban  5 mg Oral BID    ipratropium  0.5 mg Nebulization BID    And    albuterol  2.5 mg Nebulization BID    senna  1 tablet Oral Nightly    polycarbophil  625 mg Oral Daily    iron sucrose  100 mg IntraVENous Q24H    [Held by provider] verapamil  240 mg Oral Nightly    clopidogrel 75 mg Oral Daily    sodium chloride flush  5-40 mL IntraVENous 2 times per day    sodium chloride flush  5-40 mL IntraVENous 2 times per day    ondansetron  4 mg IntraVENous Once    calcium replacement protocol   Other RX Placeholder    budesonide  500 mcg Nebulization BID    montelukast  10 mg Oral Nightly       ROS: All neg unless specifically mentioned in subjective section. Exam:  General Appearance: alert ,conversing, in no acute distress  Cardiovascular: IIRR, no murmurs, rubs, clicks, or gallops  Pulmonary/Chest: clear to auscultation bilaterally- no wheezes, rales or rhonchi, normal air movement, no respiratory distress  Neurological: alert, oriented, normal speech, no focal findings or movement disorder noted  Bilateral Groins: R groin dressing changed, no thrill. Mild distention LLQ of abdomen continues to improve  Bilateral LEs: Feet warm to touch. DPs and PTs detected on doppler. Left ankle dressing removed. Assessment:   Patient Active Problem List   Diagnosis    Malignant neoplasm of upper-outer quadrant of right breast in female, estrogen receptor negative ([address]    Soft tissue mass    Breast calcification, right    Atherosclerosis of native artery of left lower extremity with rest pain (HCC)    Atherosclerosis of native artery of left lower extremity (HCC)    Localized edema    ARF (acute renal failure) with tubular necrosis (HCC)    Contrast dye induced nephropathy    Hyperkalemia    Metabolic acidosis    Essential hypertension       Plan:   Continue medical therapy and continue to monitor Hgb closely. If discharged schedule follow up with Dr. Alex Uribe on Monday.     The plan of care was discussed in detail with Dr. Bertram Haines PA-C

## 2023-02-03 NOTE — PLAN OF CARE
Problem: Respiratory - Adult  Goal: Clear lung sounds  2/3/2023 0638 by Luzma Kelly RCP  Outcome: Progressing     Problem: Respiratory - Adult  Goal: Achieves optimal ventilation and oxygenation  2/3/2023 0638 by Luzma Kelly RCP  Outcome: Progressing   Patient mutually agreed on goals.

## 2023-02-03 NOTE — PROGRESS NOTES
Internal Medicine Resident Progress Note    Patient:  Kennedy Knox    YOB: 1945  Unit/Bed:4K-05/005-A  MRN: [id number]    Acct: [de-identified]   PCP: Kathryn Altamirano MD    Date of Admission: 1/30/2023      Assessment/Plan:  Asymptomatic Bradycardia: Pt was bradycardic last night w/ HR 40-50. Unknown baseline. EKG showed A fib w/ slow ventricular response. She denies dizziness, headedness, or syncope. Holding Verapamil  Continue to monitor  Discharge tmrw if she remains stable at this HR    Acute Blood Loss Anemia 2/2 Left Rectus Sheath Hematoma s/p Left Lower Extremity Angiogram: Pt had successful left lower extremity angiogram on 1/30 but developed hypotension and was found be be in hemorrhagic shock. CTA abdomen on 1/30 showed left-sided rectus sheath hematoma w/ area of active arterial extravasation. S/p coiling to left superficial femoral circumflex artery w/ 3 mm Vortex coils x2 and 5 mm Vortex coils x 2.   Eliquis 5 mg has been resumed   S/p 2 units FFP and 3 units pRBC   Venofer started on 2/1 for low iron  Daily CBC  Transfuse to keep Hgb > 7  PAD w/ Claudication: s/p stents to left SFA, left popliteal, left common and external iliacs by Emelia Blackman MD on 1/30/23. ASA and Plavix were resumed by CT surgery on 2/1  Chronic Hypoxic Respiratory Failure: 2/2 hx of COPD. Uses 2L NC at home. Continue 2.0 L/min on NC (at baseline) to keep SpO2 > 90%  Echo ordered on 2/1 for shortness of breath - complete but results are pending    COPD: No PFTs on file. Pt uses 2L NC at home. 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 Camilo Yoder 2019. Follows with Dr. Brandon Kay.  Continue annual mammograms  GERD: Continue home PPI  Anxiety: Takes xanax 0.5 mg TID  Former Tobacco Smoker: 55 pack year smoking hx     JIMMY 2/2 contrast nephropathy - resolved. Hemorrhagic shock - resolved  Bradycardia (vasovagal) 2/2 emesis on 1/30 - resolved     Expected discharge date:  2/4/23    Disposition:   [x] Home  [] TCU  [] Rehab  [] Psych  [] SNF  [] [place]  [] Other-    ===================================================================      Chief Complaint: Angiogram procedure     Hospital Course: Madhuri Favorite a 69 y.o. female who we are asked to see/evaluate by Dr. Trudi Merlin for medical management of hypotension, bradycardia status post rectus sheath hematoma repair. Priyanka Ramos presented to the hospital today for an elective outpatient left lower extremity angiogram with possible intervention with .  Medical history includes surgically resected right sided breast cancer status post radiation therapy, COPD with 2 L/min continuously and 4 L/min at night. Hypertension, anxiety, prior tobacco user, gastroesophageal reflux disease, atrial fibrillation on apixaban. Had a history of left foot ulcer and peripheral arterial disease and was initially seen by podiatry and then referred for possible intervention to vascular surgery. Today she had aortogram, stents to her left superficial femoral artery and popliteal artery, left common iliac artery, left external iliac artery. Patient tolerated the procedure well was taken back to PACU area. There she started complaining of abdominal pain noted to be tender and firm on the left side and had a drop in her blood pressure to 90 systolic. She was taken for a emergent CT scan which showed a left rectus sheath hematoma with some contrast in the lower pelvis. She was therefore taken emergently back to interventional radiology with Dr. Trudi Merlin where he reaccessed the right femoral artery, found the source of bleeding in the circumflex artery, placed 3 coils in this with resolution of bleeding.   Was noted to be hypotensive and bradycardic both in PACU and during the procedure, received a total of 2.5 L of crystalloid and arrived in the ICU with 1 unit of PRBC running. Patient states she would like to avoid further blood transfusions as the blood she might receive might be from somebody who had received a vaccine against COVID-19. She is worried about receiving blood from somebody who had gotten a vaccine against COVID-19. She is agreeable to additional blood transfusions if necessary. On arrival to the ICU patient complained of nausea and had one episode of emesis, this improved after given ondansetron. During the episode of emesis she bradycardia down to the 40s.,  This resolved when she stopped vomiting. \"     2/1/23: ASA, Eliquis, and Plavix have been resumed by CT surgery. Nephro consulted for JIMMY. PT/OT consulted. 2/2/23: Eliquis 2.5 mg held for Hgb 7.9. Full diet started. JIMMY has resolved today. Echo is pending. 2/3/23: Eliquis 5 mg has been resumed. Discharge tomorrow. 2/4/23: Developed bradycardia overnight. Denied any symptoms. Verapamil was held. Plan is to discharge tmrw. Does not want home health to come to her house bc she has a dangerous dog. Subjective (past 24 hours):   Pt seen at bedside. Complains of headache. Denies nausea, vomiting, diarrhea, lightlessness, dizziness, fainting, chest pain, and abdominal pain. ROS: reviewed complete ROS unchanged unless otherwise stated in hospital course/subjective portion.        Medications:  Reviewed    Infusion Medications    sodium chloride Stopped (01/30/23 1056)    sodium chloride       Scheduled Medications    lidocaine  1 patch TransDERmal Daily    apixaban  5 mg Oral BID    ipratropium  0.5 mg Nebulization BID    And    albuterol  2.5 mg Nebulization BID    senna  1 tablet Oral Nightly    polycarbophil  625 mg Oral Daily    iron sucrose  100 mg IntraVENous Q24H    [Held by provider] verapamil  240 mg Oral Nightly    clopidogrel  75 mg Oral Daily sodium chloride flush  5-40 mL IntraVENous 2 times per day    sodium chloride flush  5-40 mL IntraVENous 2 times per day    ondansetron  4 mg IntraVENous Once    calcium replacement protocol   Other RX Placeholder    budesonide  500 mcg Nebulization BID    montelukast  10 mg Oral Nightly     PRN Meds: sodium chloride flush, sodium chloride, sodium chloride flush, ondansetron **OR** ondansetron, acetaminophen, oxyCODONE **OR** oxyCODONE, sodium chloride, albuterol sulfate HFA, potassium chloride **OR** potassium alternative oral replacement **OR** potassium chloride, magnesium sulfate        Intake/Output Summary (Last 24 hours) at 2/3/2023 1628  Last data filed at 2/3/2023 1248  Gross per 24 hour   Intake 825 ml   Output 1200 ml   Net -375 ml       Exam:  BP (!) 115/53   Pulse (!) 45   Temp 98.2 °F (36.8 °C) (Oral)   Resp 18   Ht 5' 2\" (1.575 m)   Wt 131 lb 9.6 oz (59.7 kg)   SpO2 98%   BMI 24.07 kg/m²     General: No distress, appears stated age. Eyes:  PERRL. Conjunctivae/corneas clear. HENT: Head normal appearing. Nares normal. Oral mucosa moist.  Hearing intact. Neck: Supple, with full range of motion. Trachea midline. No gross JVD appreciated. Respiratory:  Normal effort. Clear to auscultation, without rales or wheezes or rhonchi. Cardiovascular: Normal rate, regular rhythm with normal S1/S2 without murmurs. No lower extremity edema. Abdomen: Tender abdomen on palpitation, abdominal binder in place  Musculoskeletal: No joint swelling or tenderness. Normal tone. No abnormal movements. Skin: Warm and dry. No rashes or lesions. Neurologic:  No focal sensory/motor deficits in the upper or lower extremities. Cranial nerves:  grossly non-focal 2-12. Psychiatric: Alert and oriented, normal insight and thought content. Capillary Refill: Brisk,< 3 seconds. Peripheral Pulses: +2 palpable, equal bilaterally.        Labs:   Recent Labs     02/01/23  0337 02/02/23  0600 02/02/23  1101 02/02/23 2051 02/03/23  0346   WBC 6.2 3.7*  --   --  4.2*   HGB 8.8* 7.9* 8.3* 8.7* 8.3*   HCT 26.8* 24.2* 25.5* 26.5* 25.7*   * 120*  --   --  154     Recent Labs     02/01/23  0337 02/02/23  0600 02/03/23  0346    142 143   K 4.9 4.5 4.0    110 107   CO2 21* 24 28   BUN 31* 18 8   CREATININE 2.2* 0.9 0.8   CALCIUM 7.9* 7.7* 8.1*     No results for input(s): AST, ALT, BILIDIR, BILITOT, ALKPHOS in the last 72 hours. No results for input(s): INR in the last 72 hours. No results for input(s): TROPONINT in the last 72 hours. No results for input(s): PROCAL in the last 72 hours. No results found for: Clarise Leaver, BACTERIA, RBCUA, BLOODU, [address], [address]    Radiology (48 hours):  No results found. DVT prophylaxis:    [] Lovenox  [] SCDs  [] SQ Heparin  [] Encourage ambulation   [x] Already on Anticoagulation       Diet: ADULT DIET;  Regular  Code Status: Full Code  PT/OT: Ordered  Tele: Continuous   IVF: none    Electronically signed by Darrel Chacko DO PGY-1 on 2/3/2023 at 4:28 PM    Case was discussed with Attending, Dr. Loida Zamudio MD.

## 2023-02-03 NOTE — PROGRESS NOTES
[address] [address]  INPATIENT PHYSICAL THERAPY  EVALUATION  [facility] ICU STEPDOWN TELEMETRY 4K - [id number]    Time In: 7691  Time Out: 6285  Timed Code Treatment Minutes: 13 Minutes  Minutes: 22          Date: 2/3/2023  Patient Name: Gaurang Estrada,  Gender:  female        MRN: [id number]  : 1945  (69 y.o.)      Referring Practitioner: Marlene Valdez PA-C  Diagnosis: Localized edema  Additional Pertinent Hx: 69 y.o. female who we are asked to see/evaluate by Dr. Rock Joseph for medical management of hypotension, bradycardia status post rectus sheath hematoma repair. Ana M De La Torre presented to the hospital today for an elective outpatient left lower extremity angiogram with possible intervention with .  Medical history includes surgically resected right sided breast cancer status post radiation therapy, COPD with 2 L/min continuously and 4 L/min at night. Hypertension, anxiety, prior tobacco user, gastroesophageal reflux disease, atrial fibrillation on apixaban. Had a history of left foot ulcer and peripheral arterial disease and was initially seen by podiatry and then referred for possible intervention to vascular surgery. Today she had aortogram, stents to her left superficial femoral artery and popliteal artery, left common iliac artery, left external iliac artery. Patient tolerated the procedure well was taken back to PACU area. There she started complaining of abdominal pain noted to be tender and firm on the left side and had a drop in her blood pressure to 90 systolic. She was taken for a emergent CT scan which showed a left rectus sheath hematoma with some contrast in the lower pelvis. She was therefore taken emergently back to interventional radiology with Dr. Rock Joseph where he reaccessed the right femoral artery, found the source of bleeding in the circumflex artery, placed 3 coils in this with resolution of bleeding.   Was noted to be hypotensive and bradycardic both in PACU and during the procedure, received a total of 2.5 L of crystalloid and arrived in the ICU with 1 unit of PRBC running. Patient states she would like to avoid further blood transfusions as the blood she might receive might be from somebody who had received a vaccine against COVID-19. Restrictions/Precautions:  Restrictions/Precautions: General Precautions, Fall Risk    Subjective:  Chart Reviewed: Yes  Patient assessed for rehabilitation services?: Yes  Family / Caregiver Present: No  Subjective: RN approved session.  Pt  pleasant and agreeable to therapy    General:  Overall Orientation Status: Within Functional Limits  Vision: Within Functional Limits  Hearing: Within functional limits       Pain: 4/10: headache     Vitals: Vitals not assessed per clinical judgement, see nursing flowsheet  Oxygen: 2L NC     Social/Functional History:    Lives With: Daughter (and her family; someone home 24/7)  Type of Home: House  Home Layout: Two level, Able to Live on Main level with bedroom/bathroom  Home Access: Stairs to enter with rails  Entrance Stairs - Number of Steps: 4  Home Equipment: Oxygen (2L home O2)     Bathroom Shower/Tub: Tub/Shower unit  Bathroom Toilet: Standard  Bathroom Equipment:  (none)       ADL Assistance: [address]: Needs assistance (shares IADL tasks with family)  Ambulation Assistance: Independent  Transfer Assistance: Independent    Active : Yes          OBJECTIVE:  Range of Motion:  Bilateral Lower Extremity: WFL    Strength:  Bilateral Lower Extremity: Impaired - grossly deconditioned    Balance:  Static Sitting Balance:  Stand By Assistance  Static Standing Balance: Stand By Assistance    Bed Mobility:  Supine to Sit: Stand By Assistance, with head of bed raised  Sit to Supine: Stand By Assistance, with head of bed raised     Transfers:  Sit to Stand: Stand By Assistance  Stand to Sit:Stand By Assistance    Ambulation:  Stand By Assistance  Distance: 60'  Surface: Level Tile  Device:Rolling Walker  Gait Deviations:  Slow Serene, Decreased Step Length Bilaterally, Decreased Arm Swing, Decreased Trunk Rotation, and Decreased Gait Speed    Functional Outcome Measures: Completed  AM-PAC Inpatient Mobility Raw Score : 18  AM-PAC Inpatient T-Scale Score : 43.63    ASSESSMENT:  Activity Tolerance:  Patient tolerance of  treatment: good. Treatment Initiated: Treatment and education initiated within context of evaluation. Evaluation time included review of current medical information, gathering information related to past medical, social and functional history, completion of standardized testing, formal and informal observation of tasks, assessment of data and development of plan of care and goals. Treatment time included skilled education and facilitation of tasks to increase safety and independence with functional mobility for improved independence and quality of life. Assessment: Body Structures, Functions, Activity Limitations Requiring Skilled Therapeutic Intervention: Decreased functional mobility , Decreased endurance, Decreased posture, Decreased balance, Decreased strength  Assessment: Minerva Jaffe is a 69 y.o. female that presents post-op complications. she is ind prior to admission now requiring assist for basic mobility. Pt demonstrates a decrease in baseline by way of bed mobility, transfers and ambulation secondary to decreased activity tolerance, strength, fatigue, and balance deficits. Pt will benefit from skilled PT services throughout admission and beyond hospital discharge for improvements in functional mobility and in order to decrease fall risk and [facility] to [facility]. Therapy Prognosis: Good    Requires PT Follow-Up: Yes    Discharge Recommendations:  Discharge Recommendations: Home with assist PRN    Patient Education:      .     Patient Education  Education Given To: Patient  Education Provided: Role of Therapy, Plan of Care  Education Method: Verbal  Barriers to Learning: None  Education Outcome: Verbalized understanding       Equipment Recommendations:  Equipment Needed: No    Plan:  Current Treatment Recommendations: Strengthening, Balance training, Gait training, Functional mobility training, Stair training, Transfer training, Endurance training, Safety education & training, Therapeutic activities  General Plan:  (5x GM)    Goals:  Patient Goals : none stated  Short Term Goals  Time Frame for Short Term Goals: by discahrge  Short Term Goal 1: bed mobility with HOB flat, no rails ,mod I for increased functional ind  Short Term Goal 2: sit<>stand from various surfaces with LRD mod I for safe trasnfers  Short Term Goal 3: ambulate 100' with LRD mod I for safe household distances  Short Term Goal 4: navigate 4 steps with LRD mod I for safe enter/exit of home  Long Term Goals  Time Frame for Long Term Goals : NA d/t short ELOS    Following session, patient left in safe position with all fall risk precautions in place.      Isabel Guzman ([place]) PT, DPT

## 2023-02-03 NOTE — PROGRESS NOTES
[facility]   Nephrology progress note  2/3/2023, 10:39 AM      Pt Name:    Juan M Barclay  MRN:     [id number]     YOB: 1945  Admit Date:    1/30/2023  9:28 AM    Chief Complaint: Nephrology following for acute kidney injury and contrast-induced nephropathy. Subjective:  Patient seen and examined  No chest pain or shortness of breath  Does complain of some headache decent urine output    Objective:  24HR INTAKE/OUTPUT:    Intake/Output Summary (Last 24 hours) at 2/3/2023 1039  Last data filed at 2/3/2023 0631  Gross per 24 hour   Intake 1447.05 ml   Output 1700 ml   Net -252.95 ml        Admission weight: 124 lb (56.2 kg)  Wt Readings from Last 3 Encounters:   02/03/23 131 lb 9.6 oz (59.7 kg)   01/16/23 125 lb 3.2 oz (56.8 kg)   09/12/22 121 lb (54.9 kg)        Vitals :   Vitals:    02/02/23 2341 02/03/23 0400 02/03/23 0625 02/03/23 0745   BP: (!) 117/56 (!) 96/54  (!) 114/58   Pulse: 53 52  (!) 47   Resp: 18 18  18   Temp: 99.1 °F (37.3 °C) 98.4 °F (36.9 °C)  98.5 °F (36.9 °C)   TempSrc: Oral Oral  Oral   SpO2: 94% 96%  95%   Weight:   131 lb 9.6 oz (59.7 kg)    Height:           Physical examination  General Appearance:  Well developed.  No distress  Mouth/Throat:  Oral mucosa moist  Neck:  Supple, no JVD  Lungs:  Breath sounds: clear  Heart[de-identified]  S1,S2 heard  Abdomen:  Soft, non - tender  Musculoskeletal:  Edema -no edema    Medications:  Infusion:    sodium chloride Stopped (01/30/23 1056)    sodium chloride       Meds:    lidocaine  1 patch TransDERmal Daily    apixaban  5 mg Oral BID    ipratropium  0.5 mg Nebulization BID    And    albuterol  2.5 mg Nebulization BID    senna  1 tablet Oral Nightly    polycarbophil  625 mg Oral Daily    iron sucrose  100 mg IntraVENous Q24H    [Held by provider] verapamil  240 mg Oral Nightly    clopidogrel  75 mg Oral Daily    sodium chloride flush  5-40 mL IntraVENous 2 times per day    sodium chloride flush  5-40 mL IntraVENous 2 times per day    ondansetron  4 mg IntraVENous Once    calcium replacement protocol   Other RX Placeholder    budesonide  500 mcg Nebulization BID    montelukast  10 mg Oral Nightly       Lab Data :  CBC:   Recent Labs     02/01/23  0337 02/02/23  0600 02/02/23  1101 02/02/23  2051 02/03/23  0346   WBC 6.2 3.7*  --   --  4.2*   HGB 8.8* 7.9* 8.3* 8.7* 8.3*   HCT 26.8* 24.2* 25.5* 26.5* 25.7*   * 120*  --   --  154       CMP:  Recent Labs     02/01/23  0337 02/02/23  0600 02/03/23  0346    142 143   K 4.9 4.5 4.0    110 107   CO2 21* 24 28   BUN 31* 18 8   CREATININE 2.2* 0.9 0.8   GLUCOSE 108 90 102   CALCIUM 7.9* 7.7* 8.1*         Assessment and Plan:  Renal -acute kidney injury due to most likely contrast nephropathy patient has an arteriogram with 4 stents placed and also had a CTA of the abdomen with contrast.  Overall creatinine improved with IV hydration currently back to normal  Discontinue all IV fluids for now. Electrolytes -within normal limits  Acidosis stable resolved  Anemia possibly due to rectus sheath hematoma  Peripheral arterial disease status post arteriogram with stent placements on 1/30/2023  Rectus sheath hematoma  Meds reviewed and discussed with patient  Overall renal issues resolved will sign off call with any questions or concerns    Kleber Nicholson MD  [facility]    This report has been created using voice recognition software.  It may contain minor errors which are inherent in voice recognition technology

## 2023-02-04 VITALS
BODY MASS INDEX: 24.29 KG/M2 | RESPIRATION RATE: 16 BRPM | HEART RATE: 65 BPM | SYSTOLIC BLOOD PRESSURE: 133 MMHG | DIASTOLIC BLOOD PRESSURE: 63 MMHG | TEMPERATURE: 98.1 F | HEIGHT: 62 IN | WEIGHT: 132 LBS | OXYGEN SATURATION: 98 %

## 2023-02-04 LAB
ANION GAP SERPL CALC-SCNC: 7 MEQ/L (ref 8–16)
BUN SERPL-MCNC: 7 MG/DL (ref 7–22)
CALCIUM SERPL-MCNC: 8.3 MG/DL (ref 8.5–10.5)
CHLORIDE SERPL-SCNC: 107 MEQ/L (ref 98–111)
CO2 SERPL-SCNC: 28 MEQ/L (ref 23–33)
CREAT SERPL-MCNC: 0.7 MG/DL (ref 0.4–1.2)
DEPRECATED RDW RBC AUTO: 50.5 FL (ref 35–45)
ERYTHROCYTE [DISTWIDTH] IN BLOOD BY AUTOMATED COUNT: 14.4 % (ref 11.5–14.5)
GFR SERPL CREATININE-BSD FRML MDRD: > 60 ML/MIN/1.73M2
GLUCOSE SERPL-MCNC: 88 MG/DL (ref 70–108)
HCT VFR BLD AUTO: 26.7 % (ref 37–47)
HGB BLD-MCNC: 8.3 GM/DL (ref 12–16)
MCH RBC QN AUTO: 30.1 PG (ref 26–33)
MCHC RBC AUTO-ENTMCNC: 31.1 GM/DL (ref 32.2–35.5)
MCV RBC AUTO: 96.7 FL (ref 81–99)
PLATELET # BLD AUTO: 199 THOU/MM3 (ref 130–400)
PMV BLD AUTO: 10.4 FL (ref 9.4–12.4)
POTASSIUM SERPL-SCNC: 4 MEQ/L (ref 3.5–5.2)
RBC # BLD AUTO: 2.76 MILL/MM3 (ref 4.2–5.4)
SODIUM SERPL-SCNC: 142 MEQ/L (ref 135–145)
WBC # BLD AUTO: 3.9 THOU/MM3 (ref 4.8–10.8)

## 2023-02-04 PROCEDURE — 94640 AIRWAY INHALATION TREATMENT: CPT

## 2023-02-04 PROCEDURE — 93270 REMOTE 30 DAY ECG REV/REPORT: CPT

## 2023-02-04 PROCEDURE — 99239 HOSP IP/OBS DSCHRG MGMT >30: CPT | Performed by: STUDENT IN AN ORGANIZED HEALTH CARE EDUCATION/TRAINING PROGRAM

## 2023-02-04 PROCEDURE — 6370000000 HC RX 637 (ALT 250 FOR IP): Performed by: STUDENT IN AN ORGANIZED HEALTH CARE EDUCATION/TRAINING PROGRAM

## 2023-02-04 PROCEDURE — 6370000000 HC RX 637 (ALT 250 FOR IP): Performed by: PHYSICIAN ASSISTANT

## 2023-02-04 PROCEDURE — 2580000003 HC RX 258: Performed by: THORACIC SURGERY (CARDIOTHORACIC VASCULAR SURGERY)

## 2023-02-04 PROCEDURE — 85027 COMPLETE CBC AUTOMATED: CPT

## 2023-02-04 PROCEDURE — 2700000000 HC OXYGEN THERAPY PER DAY

## 2023-02-04 PROCEDURE — 80048 BASIC METABOLIC PNL TOTAL CA: CPT

## 2023-02-04 PROCEDURE — 6360000002 HC RX W HCPCS: Performed by: STUDENT IN AN ORGANIZED HEALTH CARE EDUCATION/TRAINING PROGRAM

## 2023-02-04 PROCEDURE — 36415 COLL VENOUS BLD VENIPUNCTURE: CPT

## 2023-02-04 RX ORDER — LEVOTHYROXINE SODIUM 0.07 MG/1
37.5 TABLET ORAL DAILY
Qty: 30 TABLET | Refills: 3 | Status: SHIPPED | OUTPATIENT
Start: 2023-02-05

## 2023-02-04 RX ADMIN — IPRATROPIUM BROMIDE 0.5 MG: 0.5 SOLUTION RESPIRATORY (INHALATION) at 07:51

## 2023-02-04 RX ADMIN — BUDESONIDE 500 MCG: 0.5 INHALANT RESPIRATORY (INHALATION) at 07:51

## 2023-02-04 RX ADMIN — CLOPIDOGREL BISULFATE 75 MG: 75 TABLET ORAL at 09:34

## 2023-02-04 RX ADMIN — CALCIUM POLYCARBOPHIL 625 MG: 625 TABLET, FILM COATED ORAL at 09:34

## 2023-02-04 RX ADMIN — APIXABAN 5 MG: 5 TABLET, FILM COATED ORAL at 09:34

## 2023-02-04 RX ADMIN — SODIUM CHLORIDE, PRESERVATIVE FREE 10 ML: 5 INJECTION INTRAVENOUS at 09:33

## 2023-02-04 RX ADMIN — ALBUTEROL SULFATE 2.5 MG: 2.5 SOLUTION RESPIRATORY (INHALATION) at 07:51

## 2023-02-04 RX ADMIN — LEVOTHYROXINE SODIUM 37.5 MCG: 0.07 TABLET ORAL at 06:51

## 2023-02-04 ASSESSMENT — PAIN SCALES - GENERAL
PAINLEVEL_OUTOF10: 3
PAINLEVEL_OUTOF10: 1

## 2023-02-04 ASSESSMENT — PAIN - FUNCTIONAL ASSESSMENT
PAIN_FUNCTIONAL_ASSESSMENT: ACTIVITIES ARE NOT PREVENTED
PAIN_FUNCTIONAL_ASSESSMENT: ACTIVITIES ARE NOT PREVENTED

## 2023-02-04 ASSESSMENT — PAIN DESCRIPTION - FREQUENCY
FREQUENCY: INTERMITTENT
FREQUENCY: INTERMITTENT

## 2023-02-04 ASSESSMENT — PAIN DESCRIPTION - LOCATION
LOCATION: HEAD
LOCATION: INCISION

## 2023-02-04 ASSESSMENT — PAIN DESCRIPTION - ONSET
ONSET: ON-GOING
ONSET: ON-GOING

## 2023-02-04 ASSESSMENT — PAIN DESCRIPTION - ORIENTATION
ORIENTATION: POSTERIOR
ORIENTATION: RIGHT

## 2023-02-04 ASSESSMENT — PAIN DESCRIPTION - PAIN TYPE
TYPE: SURGICAL PAIN
TYPE: CHRONIC PAIN

## 2023-02-04 ASSESSMENT — PAIN DESCRIPTION - DESCRIPTORS
DESCRIPTORS: NAGGING
DESCRIPTORS: DULL;ACHING

## 2023-02-04 NOTE — DISCHARGE INSTRUCTIONS
Your heart rate was running low, I will stop your verapamil. Check your blood pressure at home along with your heart rate and write it down and follow-up with your primary care physician. I have started you on thyroid medication follow-up with your primary care physician in 6 weeks to check the levels again. Get a CBC in a week to check your hemoglobin. Follow-up with your cardiologist, primary care physician and cardiothoracic surgeon. You will wear a Holter monitor for 14 days as your heart rate was running low during the hospital stay and follow-up with your cardiologist after the results. You have been started on Plavix which is a blood thinner and continue to take Eliquis. Follow-up with CT surgeon. I have discontinued your potassium supplements as you did not require replacement during the hospital stay. Get blood work in a week to check your potassium level and if they are running low then call your primary care physician to get the potassium replacements ordered.

## 2023-02-04 NOTE — PROCEDURES
14 Day Cardiac Event Monitor was applied to patient. Instructions were given and skin/monitor prep and application was demonstrated. Patient was instructed to remove monitor on 2/18 and mail back to [employer].

## 2023-02-04 NOTE — PROGRESS NOTES
Reported off to primary nurse Nadine Espinoza. Patient returned to bed after using bathroom. Call light within reach. Bedside table within reach.  Kat SMITH/RSC

## 2023-02-04 NOTE — PLAN OF CARE
Problem: Respiratory - Adult  Goal: Clear lung sounds  Outcome: Not Progressing     Patient mutually agreed on goals.

## 2023-02-04 NOTE — DISCHARGE SUMMARY
Hospital Medicine Discharge Summary      Patient Identification:   Carolyn Brown   : 1945  MRN: [id number]   Account: [de-identified]   Patient's PCP: Miguel Villaseñor. iPa Vigil MD    Admit Date: 2023   Discharge Date:   2023    Admitting Physician: Claudia Arreaga MD  Discharge Physician: Alee Alonzo MD       Discharge Diagnoses:      Hospital Course:    Patient admitted for elective procedure for significant PAD s/p stent to left SFA, left popliteal, left common and external iliac on , complicated by hemorrhagic shock received blood products. Hematoma of the left rectus sheath, status post coiling to left superficial femoral circumflex artery with 3 mm Vortex coils. Developed JIMMY with creatinine of 2.2 likely secondary to hypotension, contrast leading to prerenal azotemia causing ischemic ATN. Nephrology was consulted. Patient was a started on gentle fluids. Patient received fluids and JIMMY resolved. Hemoglobin remained stable. Blood thinners were resumed. Patient was noted to have asymptomatic bradycardia, verapamil was discontinued as it was noted after resuming verapamil. Patient blood pressure was normotensive/soft during the stay even on verapamil. TSH level elevated consistent with subclinical hypothyroidism and patient with symptom of constipation, feeling cold and tired so patient was restarted on low-dose Synthroid. On the day of discharge patient was hemodynamically vitally stable. Hemoglobin remained stable. Patient was asked to see CT surgery as an outpatient. The patient was seen and examined on day of discharge and this discharge summary is in conjunction with any daily progress note from day of discharge. The patient is discharged in stable condition.        Exam:   Vitals:  Vitals:    23 0430 23 0751 23 0802 23 1145   BP: 127/60  (!) 143/63 133/63   Pulse: 63  64 65   Resp: 18  14 16   Temp: 97.8 °F (36.6 °C)  97 °F (36.1 °C) 98.1 °F (36.7 °C)   TempSrc: Oral  Oral Oral   SpO2: 97% 95% 96% 98%   Weight: 132 lb (59.9 kg)      Height:         Weight: Weight: 132 lb (59.9 kg)     General: No distress, appears stated age. Eyes:  PERRL. Conjunctivae/corneas clear. HENT: Head normal appearing. Nares normal. Oral mucosa moist.  Hearing intact. Neck: Supple, with full range of motion. Trachea midline. No gross JVD appreciated. Respiratory:  Normal effort. Clear to auscultation, without rales or wheezes or rhonchi. Cardiovascular: Normal rate, regular rhythm with normal S1/S2 without murmurs. No lower extremity edema. Abdomen: Nontender abdomen on palpitation, abdominal binder in place, presents from resolving hematoma. Musculoskeletal: No joint swelling or tenderness. Normal tone. No abnormal movements. Skin: Warm and dry. No rashes or lesions. Neurologic:  No focal sensory/motor deficits in the upper or lower extremities. Cranial nerves:  grossly non-focal 2-12. Psychiatric: Alert and oriented, normal insight and thought content. Capillary Refill: Brisk,< 3 seconds. Peripheral Pulses: +2 palpable, equal bilaterally. Significant Diagnostic Studies    Labs:  For convenience and continuity at follow-up the following most recent labs are provided:  CBC:    Lab Results   Component Value Date/Time    WBC 3.9 02/04/2023 04:54 AM    HGB 8.3 02/04/2023 04:54 AM    HCT 26.7 02/04/2023 04:54 AM     02/04/2023 04:54 AM     Renal:    Lab Results   Component Value Date/Time     02/04/2023 04:54 AM    K 4.0 02/04/2023 04:54 AM    K 3.8 01/30/2023 10:00 AM     02/04/2023 04:54 AM    CO2 28 02/04/2023 04:54 AM    BUN 7 02/04/2023 04:54 AM    CREATININE 0.7 02/04/2023 04:54 AM    CALCIUM 8.3 02/04/2023 04:54 AM       Radiology:   IR ABDOMINAL AORTOBIFEMORAL CATHETER SERIALOGRAM   Final Result      CTA ABDOMEN PELVIS W WO CONTRAST   Final Result   Left-sided rectus sheath hematoma with an area of active arterial extravasation. The origin of this extravasation cannot be definitively visualized. Case was discussed in person by Dr. Sade Lua with Dr. Eladia Felipe at approximately 5:40 PM 1/30/2023 in the CT control room. **This report has been created using voice recognition software. It may contain minor errors which are inherent in voice recognition technology. **      Final report electronically signed by Dr Norman Arora on 1/30/2023 6:03 PM      IR ANGIOGRAM EXTREMITY LEFT   Final Result             Consults:   IP CONSULT TO SPIRITUAL SERVICES  IP CONSULT TO NEPHROLOGY      Disposition: Home  Condition at Discharge: Stable    Code Status:  Full Code     Patient Instructions:    Discharge lab work: CBC, BMP and Wear Holter monitor at discharge. Activity: activity as tolerated  Diet: ADULT DIET; Regular      Follow-up visits:   [place] [address]  [phone]  Follow up  As needed for breanna Nicole MD  [address] [place] [address]  [phone]    Follow up on 2/6/2023  your appointment time is at 1:00p, Please arrive 15mins early, Bring insurance card & Photo ID, co-pay, medication bottles & completed forms. Schedule a second follow up for 2 weeks after this appointment. Tom To MD  [address] [facility]  [phone]    Go on 2/16/2023  hospital follow up cbc for Hg, your appointment time is at 12:30p, Please arrive 15mins early, Bring insurance card & Photo ID, co-pay, medication bottles & completed forms.          Discharge Medications:        Medication List        START taking these medications      clopidogrel 75 MG tablet  Commonly known as: PLAVIX  Take 1 tablet by mouth daily     levothyroxine 75 MCG tablet  Commonly known as: SYNTHROID  Take 0.5 tablets by mouth Daily  Start taking on: February 5, 2023            Lalitha Marietta taking these medications      albuterol sulfate  (90 Base) MCG/ACT inhaler  Commonly known as: PROVENTIL;VENTOLIN;PROAIR     ALPRAZolam 0.5 MG tablet  Commonly known as: XANAX     benzonatate 100 MG capsule  Commonly known as: TESSALON     Biotin 2500 MCG Caps     budesonide 0.5 MG/2ML nebulizer suspension  Commonly known as: PULMICORT     CENTRUM SILVER PO     Eliquis 5 MG Tabs tablet  Generic drug: apixaban     fluticasone 50 MCG/ACT nasal spray  Commonly known as: FLONASE     gabapentin 100 MG capsule  Commonly known as: NEURONTIN     guaiFENesin 600 MG extended release tablet  Commonly known as: MUCINEX     HCA TRIPLE ANTIBIOTIC OINTMENT EX     HYDREA PO     LIPITOR PO     montelukast 10 MG tablet  Commonly known as: SINGULAIR     NONFORMULARY     NONFORMULARY     nystatin 393482 UNIT/ML suspension  Commonly known as: MYCOSTATIN     sennosides-docusate sodium 8.6-50 MG tablet  Commonly known as: SENOKOT-S     vitamin C 250 MG tablet     vitamin D 1000 UNIT Tabs tablet  Commonly known as: CHOLECALCIFEROL     ZINC ACETATE PO            STOP taking these medications      azithromycin 250 MG tablet  Commonly known as: ZITHROMAX     potassium chloride 20 MEQ packet  Commonly known as: KLOR-CON     verapamil 240 MG extended release capsule  Commonly known as: VERELAN               Where to Get Your Medications        These medications were sent to [address] [place] [address] - F [phone]  . Ioana Lei [address] [place] [address]      Phone: [phone]   clopidogrel 75 MG tablet  levothyroxine 75 MCG tablet                    Time Spent on discharge is 45 minutes in the examination, evaluation, counseling and review of medications and discharge plan. Thank you Caitlin Lockett. Rey Garcia MD for the opportunity to be involved in this patient's care.       Signed:    Electronically signed by Carey Sneed MD on 2/4/23 at 12:53 PM EST

## 2023-02-04 NOTE — PLAN OF CARE
Problem: Discharge Planning  Goal: Discharge to home or other facility with appropriate resources  Recent Flowsheet Documentation  Taken 2/3/2023 2030 by Naga Nelson RN  Discharge to home or other facility with appropriate resources:   Identify barriers to discharge with patient and caregiver   Arrange for needed discharge resources and transportation as appropriate   Identify discharge learning needs (meds, wound care, etc)   Refer to discharge planning if patient needs post-hospital services based on physician order or complex needs related to functional status, cognitive ability or social support system     Problem: Respiratory - Adult  Goal: Achieves optimal ventilation and oxygenation  Recent Flowsheet Documentation  Taken 2/3/2023 2030 by Naga Nelson RN  Achieves optimal ventilation and oxygenation:   Assess for changes in respiratory status   Assess for changes in mentation and behavior   Position to facilitate oxygenation and minimize respiratory effort   Oxygen supplementation based on oxygen saturation or arterial blood gases   Respiratory therapy support as indicated     Problem: Cardiovascular - Adult  Goal: Maintains optimal cardiac output and hemodynamic stability  Recent Flowsheet Documentation  Taken 2/3/2023 2030 by Naga Nelson RN  Maintains optimal cardiac output and hemodynamic stability:   Monitor blood pressure and heart rate   Monitor urine output and notify Licensed Independent Practitioner for values outside of normal range   Assess for signs of decreased cardiac output     Problem: Skin/Tissue Integrity - Adult  Goal: Incisions, wounds, or drain sites healing without S/S of infection  Recent Flowsheet Documentation  Taken 2/3/2023 2030 by Naga Nelson RN  Incisions, Wounds, or Drain Sites Healing Without Sign and Symptoms of Infection:   ADMISSION and DAILY: Assess and document risk factors for pressure ulcer development   TWICE DAILY: Assess and document skin integrity     Problem: Musculoskeletal - Adult  Goal: Return mobility to safest level of function  Recent Flowsheet Documentation  Taken 2/3/2023 2030 by Zohaib Hughes RN  Return Mobility to Safest Level of Function:   Ensure adequate protection for wounds/incisions during mobilization   Assess patient stability and activity tolerance for standing, transferring and ambulating with or without assistive devices   Assist with transfers and ambulation using safe patient handling equipment as needed   Obtain physical therapy/occupational therapy consults as needed   Instruct patient/family in ordered activity level   Apply continuous passive motion per provider or physical therapy orders to increase flexion toward goal     Problem: Musculoskeletal - Adult  Goal: Return ADL status to a safe level of function  Recent Flowsheet Documentation  Taken 2/3/2023 2030 by Zohaib Hughes RN  Return ADL Status to a Safe Level of Function:   Administer medication as ordered   Assess activities of daily living deficits and provide assistive devices as needed   Obtain physical therapy/occupational therapy consults as needed   Assist and instruct patient to increase activity and self care as tolerated     Problem: Genitourinary - Adult  Goal: Absence of urinary retention  Recent Flowsheet Documentation  Taken 2/3/2023 2030 by Zohaib Hughes RN  Absence of urinary retention:   Assess patients ability to void and empty bladder   Monitor intake/output and perform bladder scan as needed     Problem: Infection - Adult  Goal: Absence of infection at discharge  Recent Flowsheet Documentation  Taken 2/3/2023 2030 by Zohaib Hughes RN  Absence of infection at discharge:   Assess and monitor for signs and symptoms of infection   Monitor lab/diagnostic results   Monitor all insertion sites i.e., indwelling lines, tubes and drains   Identify and instruct in appropriate isolation precautions for identified infection/condition   Instruct and encourage patient and family to use good hand hygiene technique     Problem: Pain  Goal: Verbalizes/displays adequate comfort level or baseline comfort level  Recent Flowsheet Documentation  Taken 2/3/2023 2030 by Laney Correa RN  Verbalizes/displays adequate comfort level or baseline comfort level:   Encourage patient to monitor pain and request assistance   Assess pain using appropriate pain scale   Administer analgesics based on type and severity of pain and evaluate response   Implement non-pharmacological measures as appropriate and evaluate response   Notify Licensed Independent Practitioner if interventions unsuccessful or patient reports new pain     Care plan reviewed with patient. Patient verbalize understanding of the plan of care and contribute to goal setting.

## 2023-02-04 NOTE — PROGRESS NOTES
Discharge teaching and instructions for diagnosis/procedure of angiogram completed with patient using teachback method. AVS reviewed. Printed prescriptions given to patient. Patient voiced understanding regarding prescriptions, follow up appointments, and care of self at home. Discharged in a wheelchair to  home with support per family.

## 2023-02-04 NOTE — PROGRESS NOTES
Hemodynamically stable. Awake and alert. Patient denies any pain in the left lower extremity or abdominal wall. Loud monophasic Doppler signal over left DP and PT. To be discharged home today. Follow-up in 3 days.

## 2023-02-06 ENCOUNTER — OFFICE VISIT (OUTPATIENT)
Dept: CARDIOTHORACIC SURGERY | Age: 78
End: 2023-02-06
Payer: MEDICARE

## 2023-02-06 VITALS
BODY MASS INDEX: 24.66 KG/M2 | HEART RATE: 91 BPM | DIASTOLIC BLOOD PRESSURE: 71 MMHG | SYSTOLIC BLOOD PRESSURE: 127 MMHG | WEIGHT: 134 LBS | HEIGHT: 62 IN

## 2023-02-06 DIAGNOSIS — I70.223 ATHEROSCLEROSIS OF NATIVE ARTERIES OF EXTREMITIES WITH REST PAIN, BILATERAL LEGS (HCC): ICD-10-CM

## 2023-02-06 DIAGNOSIS — I70.213 ATHEROSCLEROSIS OF NATIVE ARTERY OF BOTH LOWER EXTREMITIES WITH INTERMITTENT CLAUDICATION (HCC): Primary | ICD-10-CM

## 2023-02-06 DIAGNOSIS — I70.245 ATHSCL NATIVE ARTERIES OF LEFT LEG W ULCERATION OTH PRT FOOT (HCC): ICD-10-CM

## 2023-02-06 PROCEDURE — G8400 PT W/DXA NO RESULTS DOC: HCPCS | Performed by: THORACIC SURGERY (CARDIOTHORACIC VASCULAR SURGERY)

## 2023-02-06 PROCEDURE — G8484 FLU IMMUNIZE NO ADMIN: HCPCS | Performed by: THORACIC SURGERY (CARDIOTHORACIC VASCULAR SURGERY)

## 2023-02-06 PROCEDURE — G8420 CALC BMI NORM PARAMETERS: HCPCS | Performed by: THORACIC SURGERY (CARDIOTHORACIC VASCULAR SURGERY)

## 2023-02-06 PROCEDURE — 99214 OFFICE O/P EST MOD 30 MIN: CPT | Performed by: THORACIC SURGERY (CARDIOTHORACIC VASCULAR SURGERY)

## 2023-02-06 PROCEDURE — G8427 DOCREV CUR MEDS BY ELIG CLIN: HCPCS | Performed by: THORACIC SURGERY (CARDIOTHORACIC VASCULAR SURGERY)

## 2023-02-06 PROCEDURE — 1036F TOBACCO NON-USER: CPT | Performed by: THORACIC SURGERY (CARDIOTHORACIC VASCULAR SURGERY)

## 2023-02-06 PROCEDURE — 1123F ACP DISCUSS/DSCN MKR DOCD: CPT | Performed by: THORACIC SURGERY (CARDIOTHORACIC VASCULAR SURGERY)

## 2023-02-06 PROCEDURE — 1111F DSCHRG MED/CURRENT MED MERGE: CPT | Performed by: THORACIC SURGERY (CARDIOTHORACIC VASCULAR SURGERY)

## 2023-02-06 PROCEDURE — 3078F DIAST BP <80 MM HG: CPT | Performed by: THORACIC SURGERY (CARDIOTHORACIC VASCULAR SURGERY)

## 2023-02-06 PROCEDURE — 1090F PRES/ABSN URINE INCON ASSESS: CPT | Performed by: THORACIC SURGERY (CARDIOTHORACIC VASCULAR SURGERY)

## 2023-02-06 PROCEDURE — 3074F SYST BP LT 130 MM HG: CPT | Performed by: THORACIC SURGERY (CARDIOTHORACIC VASCULAR SURGERY)

## 2023-02-06 NOTE — PROGRESS NOTES
[address] [facility] SURGERY  [address] [facility] [address]  Dept: [phone]  Dept Fax: [phone]: [phone]    Visit Date: 2/6/2023    Ms. Jamila Ramirez is a 69 y. o.female  who presented for:  Chief Complaint   Patient presents with    Post-Op Check     IR angiogram 1/30/23       HPI:   HPI     Ms. Jamila Ramirez presents to clinic for followup after her procedures last week. She is s/p Left GERRY/EIA stents, left SFA/Pop stents for left leg rest pain and ulcer to foot. Procedure notable for postoperative left rectus hematoma, hypotension and anemia and she was taken back to the IR lab for Coil embolization of the left superficial femoral reflux artery bleeding with multiple 3 mm and 5 mm Vortx coils. She did well postoperatively. She did not have respiratory failure. Brief JIMMY resolved on POD#2. Discharged to home on 2/4/23 with Plavix and Eliquis, though she has not taken the Plavix yet as she has not picked up the prescription    She states she is doing very well, and the abdominal pain has resolved and she feels stronger, and the left foot is warmer and stronger. No SOB or chest pain. Urinating well. No constipation. Tolerating diet.     Current Outpatient Medications:     levothyroxine (SYNTHROID) 75 MCG tablet, Take 0.5 tablets by mouth Daily, Disp: 30 tablet, Rfl: 3    clopidogrel (PLAVIX) 75 MG tablet, Take 1 tablet by mouth daily, Disp: 30 tablet, Rfl: 5    Neomycin-Bacitracin-Polymyxin (HCA TRIPLE ANTIBIOTIC OINTMENT EX), Apply topically as needed, Disp: , Rfl:     ELIQUIS 5 MG TABS tablet, Take 5 mg by mouth 2 times daily, Disp: , Rfl:     Zinc Acetate, Oral, (ZINC ACETATE PO), Take by mouth, Disp: , Rfl:     Ascorbic Acid (VITAMIN C) 250 MG tablet, Take 250 mg by mouth daily, Disp: , Rfl:     NONFORMULARY, Neuroflow plus, Disp: , Rfl:     Biotin 2500 MCG CAPS, Take by mouth, Disp: , Rfl:     Hydroxyurea (HYDREA PO), Take by mouth daily, Disp: , Rfl:     Atorvastatin Calcium (LIPITOR PO), Take by mouth daily, Disp: , Rfl:     guaiFENesin (MUCINEX) 600 MG extended release tablet, Take 1,200 mg by mouth 2 times daily, Disp: , Rfl:     nystatin (MYCOSTATIN) 969993 UNIT/ML suspension, Take 500,000 Units by mouth 4 times daily, Disp: , Rfl:     Multiple Vitamins-Minerals (CENTRUM SILVER PO), Take 1 tablet by mouth daily, Disp: , Rfl:     vitamin D (CHOLECALCIFEROL) 1000 UNIT TABS tablet, Take 1,000 Units by mouth daily, Disp: , Rfl:     budesonide (PULMICORT) 0.5 MG/2ML nebulizer suspension, Take 1 ampule by nebulization 2 times daily, Disp: , Rfl:     montelukast (SINGULAIR) 10 MG tablet, Take 10 mg by mouth nightly, Disp: , Rfl:     ALPRAZolam (XANAX) 0.5 MG tablet, Take 0.5 mg by mouth 3 times daily as needed for Sleep., Disp: , Rfl:     benzonatate (TESSALON) 100 MG capsule, Take 100 mg by mouth 3 times daily as needed for Cough, Disp: , Rfl:     albuterol sulfate  (90 Base) MCG/ACT inhaler, Inhale 2 puffs into the lungs every 4 hours as needed for Wheezing, Disp: , Rfl:     sennosides-docusate sodium (SENOKOT-S) 8.6-50 MG tablet, Take 1 tablet by mouth daily as needed for Constipation, Disp: , Rfl:     NONFORMULARY, Eye drops as needed FOR DRY EYE, Disp: , Rfl:     fluticasone (FLONASE) 50 MCG/ACT nasal spray, 1 spray by Nasal route daily as needed for Rhinitis, Disp: , Rfl:     gabapentin (NEURONTIN) 100 MG capsule, Take 100 mg by mouth 3 times daily. , Disp: , Rfl:     Allergies   Allergen Reactions    Brethine [Terbutaline] Itching    Norco [Hydrocodone-Acetaminophen] Itching    Keflex [Cephalexin] Hives       Past Medical History  Natty Hernandez  has a past medical history of Abnormal weight gain, Adjustment disorder, Anemia, Anxiety, Breast cancer ([facility] Cancer ([facility] COPD (chronic obstructive pulmonary disease) [facility] GERD (gastroesophageal reflux disease), Heart palpitations, History of therapeutic radiation, Hypertension, Hypokalemia, and Tinea unguium. Social History  Halima Esparza  reports that she quit smoking about 18 months ago. Her smoking use included cigarettes. She smoked an average of 2 packs per day. She has never used smokeless tobacco. She reports current drug use. Frequency: 2.00 times per week. Drug: Marijuana [place]). She reports that she does not drink alcohol. Family History  Halima Esparza family history includes Breast Cancer (age of onset: 28) in her mother; Breast Cancer (age of onset: 39) in her sister; Cancer in her mother; Diabetes in her maternal grandmother; Liver Disease in her sister; Other in her brother, father, and sister. There is no family history of bicuspid aortic valve, aneurysms, heart transplant, pacemakers, defibrillators, or sudden cardiac death.       Past Surgical History   Past Surgical History:   Procedure Laterality Date    BREAST SURGERY Right 4/9/2019    RIGHT BREAST EXCISIONAL BX, PREOP NEEDLE LOC performed by Juanito Bassett MD at [place] [address]      umbilical -as a child    LUNG BIOPSY  2016    [facility]-    OTHER SURGICAL HISTORY      cubital tunnel release left elbow-    OTHER SURGICAL HISTORY  2000    Nu removal-    OTHER SURGICAL HISTORY  05/09/2018    excision back/lipoma    [place] MASTECTOMY PARTIAL Right 3/15/2018    RIGHT BREAST LUMPECTOMY, SENTINAL LYMPH NODE BIOPSY performed by Juanito Bassett MD at [facility] OFFICE/OUTPT [address] [place] [address] N/A 5/9/2018    EXCISION BACK LIPOMA performed by Juanito Bassett MD at [facility] LEFT Left 2019     BREAST BIOPSY W LOC DEVICE 1ST LESION RIGHT Right 2019       Subjective:     Review of Systems  Negative except for HPI    Objective:     /71 (Site: Left Upper Arm, Position: Sitting, Cuff Size: Medium Adult)   Pulse 91   Ht 5' 2\" (1.575 m)   Wt 134 lb (60.8 kg)   BMI 24.51 kg/m²     Wt Readings from Last 3 Encounters:   02/06/23 134 lb (60.8 kg) 02/04/23 132 lb (59.9 kg)   01/16/23 125 lb 3.2 oz (56.8 kg)     BP Readings from Last 3 Encounters:   02/06/23 127/71   02/04/23 133/63   01/16/23 130/67       Physical Exam  Lungs: scattered wheezing  CV: Irregular  Abd: soft, nt/nd+BS  Ext: Left lower extremity  Trace edema in ankle  Warm foot  Triphasic TP and digital signals  Biphasic DP    Lab Results   Component Value Date/Time    WBC 3.9 02/04/2023 04:54 AM    RBC 2.76 02/04/2023 04:54 AM    HGB 8.3 02/04/2023 04:54 AM    HCT 26.7 02/04/2023 04:54 AM    MCV 96.7 02/04/2023 04:54 AM    MCH 30.1 02/04/2023 04:54 AM    MCHC 31.1 02/04/2023 04:54 AM    RDW 15.9 03/02/2018 12:00 PM     02/04/2023 04:54 AM    MPV 10.4 02/04/2023 04:54 AM       Lab Results   Component Value Date/Time     02/04/2023 04:54 AM    K 4.0 02/04/2023 04:54 AM    K 3.8 01/30/2023 10:00 AM     02/04/2023 04:54 AM    CO2 28 02/04/2023 04:54 AM    BUN 7 02/04/2023 04:54 AM    CREATININE 0.7 02/04/2023 04:54 AM    CALCIUM 8.3 02/04/2023 04:54 AM    LABGLOM >60 02/04/2023 04:54 AM    GLUCOSE 88 02/04/2023 04:54 AM    GLUCOSE 90 10/04/2019 11:58 AM       Lab Results   Component Value Date/Time    ALT 14 10/04/2019 11:58 AM       Lab Results   Component Value Date/Time    MG 3.0 01/31/2023 05:50 AM       Lab Results   Component Value Date    INR 1.33 (H) 01/30/2023    INR 1.04 01/30/2023         No results found for: LABA1C    No results found for: TRIG, HDL, LDLCALC, LDLDIRECT, LABVLDL    Lab Results   Component Value Date/Time    TSH 8.360 02/03/2023 03:46 AM           Assessment/Plan     1.  Atherosclerosis of native artery of both lower extremities with intermittent claudication (HCC)    2. Athscl native arteries of left leg w ulceration oth prt foot ([address]    3. Atherosclerosis of native arteries of extremities with rest pain, bilateral legs ([address]       Orders Placed This Encounter   Procedures    CTA ABDOMINAL AORTA W BILAT RUNOFF W WO CONTRAST     F/U next week with CT angiogram of the aorta and runoff. No follow-ups on file.       Electronically signed by Patric Garcia MD   2/6/2023 at 1:18 PM EST

## 2023-02-07 ENCOUNTER — HOSPITAL ENCOUNTER (OUTPATIENT)
Dept: INTERVENTIONAL RADIOLOGY/VASCULAR | Age: 78
Discharge: HOME | End: 2023-02-07

## 2023-02-07 DIAGNOSIS — I87.2 VENOUS INSUFFICIENCY: ICD-10-CM

## 2023-02-17 ENCOUNTER — HOSPITAL ENCOUNTER (OUTPATIENT)
Dept: AUDIOLOGY | Age: 78
Discharge: HOME | End: 2023-02-17
Payer: COMMERCIAL

## 2023-02-17 PROCEDURE — V5160 DISPENSING FEE BINAURAL: HCPCS | Performed by: AUDIOLOGIST

## 2023-02-17 PROCEDURE — V5259 HEARING AID, DIGIT, BIN, ITC: HCPCS | Performed by: AUDIOLOGIST

## 2023-02-17 NOTE — PROGRESS NOTES
[facility]#: [id number]   ACCT#: [de-identified]  PAYOR: [employer]  Number of visits allowed: N/C for 1 year  Charge for follow up visits: $20    DIAGNOSIS: H90.3            NEW HEARING AID FITTING: A mInfo Evolv AI 1200 ITC-R hearing aid was fit and dispensed for both ears. Explained care, use and insertion/removal.  Programmed. Push button VC active- Left: Lower, Right[de-identified] Raise No bluetooth pairings. Hearing aid fitting recheck scheduled for 03/03/2023. SPEECH MAPPING:    The [employer] real ear system was used to perform verification of Jennifer's hearing aid fitting. The output of Jennifer's Sonya amplification was programmed to match appropriate NAL-NAL2 targets for MPO, soft, medium and loud stimuli utilizing speech mapping based on her 01/24/2023 audiogram.        Speech mapping results suggest: good target match with improved audibility. High frequency gain (2000 Hz and above) was reduced to first fit targets to ease occlusion complaints. Patient was counseled regarding the effect of the change on expected benefit of her amplification. Client billing has been emailed regarding expected [employer] payment.

## 2023-02-20 ENCOUNTER — HOSPITAL ENCOUNTER (OUTPATIENT)
Dept: CT IMAGING | Age: 78
Discharge: HOME | End: 2023-02-20
Payer: MEDICARE

## 2023-02-20 DIAGNOSIS — I70.223 ATHEROSCLEROSIS OF NATIVE ARTERIES OF EXTREMITIES WITH REST PAIN, BILATERAL LEGS (HCC): ICD-10-CM

## 2023-02-20 DIAGNOSIS — I70.245 ATHSCL NATIVE ARTERIES OF LEFT LEG W ULCERATION OTH PRT FOOT (HCC): ICD-10-CM

## 2023-02-20 PROCEDURE — 6360000004 HC RX CONTRAST MEDICATION: Performed by: THORACIC SURGERY (CARDIOTHORACIC VASCULAR SURGERY)

## 2023-02-20 PROCEDURE — 75635 CT ANGIO ABDOMINAL ARTERIES: CPT

## 2023-02-20 RX ADMIN — IOPAMIDOL 80 ML: 755 INJECTION, SOLUTION INTRAVENOUS at 15:09

## 2023-02-27 ENCOUNTER — OFFICE VISIT (OUTPATIENT)
Dept: CARDIOTHORACIC SURGERY | Age: 78
End: 2023-02-27
Payer: MEDICARE

## 2023-02-27 VITALS
DIASTOLIC BLOOD PRESSURE: 71 MMHG | HEART RATE: 75 BPM | BODY MASS INDEX: 22.45 KG/M2 | WEIGHT: 122 LBS | SYSTOLIC BLOOD PRESSURE: 124 MMHG | HEIGHT: 62 IN

## 2023-02-27 DIAGNOSIS — I87.2 VENOUS INSUFFICIENCY: ICD-10-CM

## 2023-02-27 DIAGNOSIS — I70.213 ATHEROSCLEROSIS OF NATIVE ARTERY OF BOTH LOWER EXTREMITIES WITH INTERMITTENT CLAUDICATION (HCC): Primary | ICD-10-CM

## 2023-02-27 DIAGNOSIS — S30.1XXD RECTUS SHEATH HEMATOMA, SUBSEQUENT ENCOUNTER: ICD-10-CM

## 2023-02-27 PROCEDURE — 3078F DIAST BP <80 MM HG: CPT | Performed by: THORACIC SURGERY (CARDIOTHORACIC VASCULAR SURGERY)

## 2023-02-27 PROCEDURE — 99214 OFFICE O/P EST MOD 30 MIN: CPT | Performed by: THORACIC SURGERY (CARDIOTHORACIC VASCULAR SURGERY)

## 2023-02-27 PROCEDURE — 1090F PRES/ABSN URINE INCON ASSESS: CPT | Performed by: THORACIC SURGERY (CARDIOTHORACIC VASCULAR SURGERY)

## 2023-02-27 PROCEDURE — G8400 PT W/DXA NO RESULTS DOC: HCPCS | Performed by: THORACIC SURGERY (CARDIOTHORACIC VASCULAR SURGERY)

## 2023-02-27 PROCEDURE — G8427 DOCREV CUR MEDS BY ELIG CLIN: HCPCS | Performed by: THORACIC SURGERY (CARDIOTHORACIC VASCULAR SURGERY)

## 2023-02-27 PROCEDURE — 1123F ACP DISCUSS/DSCN MKR DOCD: CPT | Performed by: THORACIC SURGERY (CARDIOTHORACIC VASCULAR SURGERY)

## 2023-02-27 PROCEDURE — 3074F SYST BP LT 130 MM HG: CPT | Performed by: THORACIC SURGERY (CARDIOTHORACIC VASCULAR SURGERY)

## 2023-02-27 PROCEDURE — 1036F TOBACCO NON-USER: CPT | Performed by: THORACIC SURGERY (CARDIOTHORACIC VASCULAR SURGERY)

## 2023-02-27 PROCEDURE — G8484 FLU IMMUNIZE NO ADMIN: HCPCS | Performed by: THORACIC SURGERY (CARDIOTHORACIC VASCULAR SURGERY)

## 2023-02-27 PROCEDURE — 1111F DSCHRG MED/CURRENT MED MERGE: CPT | Performed by: THORACIC SURGERY (CARDIOTHORACIC VASCULAR SURGERY)

## 2023-02-27 PROCEDURE — G8420 CALC BMI NORM PARAMETERS: HCPCS | Performed by: THORACIC SURGERY (CARDIOTHORACIC VASCULAR SURGERY)

## 2023-02-27 NOTE — PROGRESS NOTES
[facility]  [address] [place] [address]  Dept: [phone]  Dept Fax: [phone]: [phone]    Visit Date: 2/27/2023    Ms. Radha Easton is a 69 y. o.female  who presented for:  Chief Complaint   Patient presents with    Results     CTA Abd. Aorta 2/21/23       HPI:   HPI     Ms. Radha Easton returns to clinic for 2nd postoperative visit. She is doing well and improving slowly. Here for review of venous and arterial studies. She is s/p Left GERRY/EIA stents, left SFA/Pop stents for left leg rest pain and ulcer to foot. Procedure notable for postoperative left rectus hematoma, hypotension and anemia and she was taken back to the IR lab for Coil embolization of the left superficial femoral reflux artery bleeding with multiple 3 mm and 5 mm Vortx coils. She did well postoperatively. She did not have respiratory failure. Brief JIMMY resolved on POD#2. Discharged to home on 2/4/23 with Plavix and Eliquis, though she has not taken the Plavix yet as she has not picked up the prescription     She states she is doing very well, and the abdominal pain has resolved and she feels stronger, and the left foot is warmer and stronger. No SOB or chest pain. Urinating well. No constipation. Tolerating diet. CT angiogram followup study:    1. Large collection in the rectus sheath consistent with a hematoma. This collection has increased in size since the prior exam. However, there are embolization coils in this region and does not appear to be active bleeding. 2. Numerous vascular stents are present throughout the left lower extremity which are widely patent. There appears to be three-vessel runoff to both feet. Per my reading the hematoma is unchanged and there is no active bleeding. Left leg arterial vessels widely patent. Venous reflux study:    1.  DEEP VENOUS REFLUX greater than 1 second: LEFT...... Common femoral vein        RIGHT. ... None        2. SUPERFICIAL VENOUS REFLUX greater than 0.5 seconds:        LEFT. ... Saphenofemoral junction        RIGHT. .... None     Allergies   Allergen Reactions    Brethine [Terbutaline] Itching    Norco [Hydrocodone-Acetaminophen] Itching    Keflex [Cephalexin] Hives       Past Medical History  Norman Martinez  has a past medical history of Abnormal weight gain, Adjustment disorder, Anemia, Anxiety, Breast cancer ([facility] Cancer ([facility] [facility] COPD (chronic obstructive pulmonary disease) ([facility] [address] [facility] GERD (gastroesophageal reflux disease), Heart palpitations, History of therapeutic radiation, Hypertension, Hypokalemia, and Tinea unguium. Social History  Norman Martinez  reports that she quit smoking about 19 months ago. Her smoking use included cigarettes. She smoked an average of 2 packs per day. She has never used smokeless tobacco. She reports current drug use. Frequency: 2.00 times per week. Drug: Marijuana Iraida Katz). She reports that she does not drink alcohol. Family History  Norman Martinez family history includes Breast Cancer (age of onset: 28) in her mother; Breast Cancer (age of onset: 39) in her sister; Cancer in her mother; Diabetes in her maternal grandmother; Liver Disease in her sister; Other in her brother, father, and sister. There is no family history of bicuspid aortic valve, aneurysms, heart transplant, pacemakers, defibrillators, or sudden cardiac death.       Past Surgical History   Past Surgical History:   Procedure Laterality Date    BREAST SURGERY Right 4/9/2019    RIGHT BREAST EXCISIONAL BX, PREOP NEEDLE LOC performed by Carlisle Spurling, MD at [place] [address]      umbilical -as a child    LUNG BIOPSY  2016    [facility]-    OTHER SURGICAL HISTORY      cubital tunnel release left elbow-    OTHER SURGICAL HISTORY  2000    Nu removal-    OTHER SURGICAL HISTORY  05/09/2018    excision back/lipoma    IA MASTECTOMY PARTIAL Right 3/15/2018    RIGHT BREAST LUMPECTOMY, SENTINAL LYMPH NODE BIOPSY performed by Ugo Jimenez MD at [facility]/OUTPT [facility] N/A 5/9/2018    EXCISION BACK LIPOMA performed by Ugo Jimenez MD at [facility] 8 LEFT Left 2019    US BREAST BIOPSY W LOC DEVICE 1ST LESION RIGHT Right 2019       Subjective:     Review of Systems  Negative except for HPI    Objective:     /71 (Site: Left Upper Arm, Position: Sitting, Cuff Size: Medium Adult)   Pulse 75   Ht 5' 2\" (1.575 m)   Wt 122 lb (55.3 kg)   BMI 22.31 kg/m²     Wt Readings from Last 3 Encounters:   02/27/23 122 lb (55.3 kg)   02/06/23 134 lb (60.8 kg)   02/04/23 132 lb (59.9 kg)     BP Readings from Last 3 Encounters:   02/27/23 124/71   02/06/23 127/71   02/04/23 133/63       Physical Exam  Physical Exam  Lungs: scattered wheezing  CV: Irregular  Abd: slightly firm but non-tender, nt/nd+BS  Ext: Left lower extremity  Trace edema in ankle  Warm foot  Triphasic TP and digital signals  Biphasic DP  Lab Results   Component Value Date/Time    WBC 4.9 02/14/2023 08:45 AM    WBC 4.8 02/14/2023 08:45 AM    RBC 3.42 02/14/2023 08:45 AM    RBC 3.46 02/14/2023 08:45 AM    HGB 10.3 02/14/2023 08:45 AM    HGB 10.5 02/14/2023 08:45 AM    HCT 31.1 02/14/2023 08:45 AM    HCT 31.3 02/14/2023 08:45 AM    MCV 90.9 02/14/2023 08:45 AM    MCV 90.5 02/14/2023 08:45 AM    MCH 30.1 02/14/2023 08:45 AM    MCH 30.4 02/14/2023 08:45 AM    MCHC 33.1 02/14/2023 08:45 AM    MCHC 33.6 02/14/2023 08:45 AM    RDW 16.6 02/14/2023 08:45 AM    RDW 16.3 02/14/2023 08:45 AM     02/14/2023 08:45 AM     02/14/2023 08:45 AM    MPV 10.4 02/04/2023 04:54 AM       Lab Results   Component Value Date/Time     02/14/2023 08:45 AM     02/14/2023 08:45 AM    K 3.5 02/14/2023 08:45 AM    K 3.5 02/14/2023 08:45 AM    K 3.8 01/30/2023 10:00 AM     02/14/2023 08:45 AM     02/14/2023 08:45 AM    CO2 33 02/14/2023 08:45 AM    CO2 31 02/14/2023 08:45 AM    BUN 10 02/14/2023 08:45 AM    BUN 9 02/14/2023 08:45 AM    CREATININE 0.50 02/14/2023 08:45 AM    CREATININE 0.60 02/14/2023 08:45 AM    CALCIUM 8.90 02/14/2023 08:45 AM    CALCIUM 9.10 02/14/2023 08:45 AM    LABGLOM >60 02/04/2023 04:54 AM    GLUCOSE 88 02/04/2023 04:54 AM    GLUCOSE 90 10/04/2019 11:58 AM       Lab Results   Component Value Date/Time    ALT 21 02/14/2023 08:45 AM       Lab Results   Component Value Date/Time    MG 3.0 01/31/2023 05:50 AM       Lab Results   Component Value Date    INR 1.33 (H) 01/30/2023    INR 1.04 01/30/2023         No results found for: LABA1C    Lab Results   Component Value Date/Time    TRIG 120 02/14/2023 08:45 AM    HDL 38 02/14/2023 08:45 AM    LDLDIRECT 58 02/14/2023 08:45 AM       Lab Results   Component Value Date/Time    TSH 8.360 02/03/2023 03:46 AM           Assessment/Plan     1. Atherosclerosis of native artery of both lower extremities with intermittent claudication ([address]    2. Rectus sheath hematoma, subsequent encounter    3. Venous insufficiency      Plan:  Wear abdominal binders daily. Schedule arterial duplex bilaterally for followup. Elevate legs, avoid prolonged standing. No follow-ups on file.       Electronically signed by Leida Edwards MD   2/27/2023 at 3:04 PM EST

## 2023-02-27 NOTE — PATIENT INSTRUCTIONS
Get arterial doppler and CBC in about 1 week. Please call our office with any concerns. [phone]    If you receive a survey asking about your care experience, please respond. Your answers will help ensure you receive high-quality care at this office. Thank you!

## 2023-03-07 ENCOUNTER — HOSPITAL ENCOUNTER (OUTPATIENT)
Dept: AUDIOLOGY | Age: 78
Discharge: HOME | End: 2023-03-07

## 2023-03-07 PROCEDURE — 9990000010 HC NO CHARGE VISIT: Performed by: AUDIOLOGIST

## 2023-03-07 NOTE — PROGRESS NOTES
TWO WEEK CHECK: The patient is doing well with the new hearing aids. Anamaria Oren does not report any problems. Patient to call and schedule an annual hearing aid check. Will see patient sooner if any problems arise.

## 2023-03-10 ENCOUNTER — HOSPITAL ENCOUNTER (OUTPATIENT)
Dept: INTERVENTIONAL RADIOLOGY/VASCULAR | Age: 78
Discharge: HOME | End: 2023-03-10
Payer: MEDICARE

## 2023-03-10 DIAGNOSIS — I70.213 ATHEROSCLEROSIS OF NATIVE ARTERY OF BOTH LOWER EXTREMITIES WITH INTERMITTENT CLAUDICATION (HCC): ICD-10-CM

## 2023-03-10 PROCEDURE — 93925 LOWER EXTREMITY STUDY: CPT

## 2023-03-20 ENCOUNTER — OFFICE VISIT (OUTPATIENT)
Dept: CARDIOTHORACIC SURGERY | Age: 78
End: 2023-03-20
Payer: MEDICARE

## 2023-03-20 VITALS
SYSTOLIC BLOOD PRESSURE: 128 MMHG | HEIGHT: 62 IN | HEART RATE: 72 BPM | WEIGHT: 122 LBS | BODY MASS INDEX: 22.45 KG/M2 | OXYGEN SATURATION: 97 % | DIASTOLIC BLOOD PRESSURE: 71 MMHG

## 2023-03-20 DIAGNOSIS — I70.245 ATHSCL NATIVE ARTERIES OF LEFT LEG W ULCERATION OTH PRT FOOT (HCC): Primary | ICD-10-CM

## 2023-03-20 DIAGNOSIS — I83.813 VARICOSE VEINS OF BOTH LOWER EXTREMITIES WITH PAIN: ICD-10-CM

## 2023-03-20 PROCEDURE — G8484 FLU IMMUNIZE NO ADMIN: HCPCS | Performed by: THORACIC SURGERY (CARDIOTHORACIC VASCULAR SURGERY)

## 2023-03-20 PROCEDURE — 3078F DIAST BP <80 MM HG: CPT | Performed by: THORACIC SURGERY (CARDIOTHORACIC VASCULAR SURGERY)

## 2023-03-20 PROCEDURE — 1036F TOBACCO NON-USER: CPT | Performed by: THORACIC SURGERY (CARDIOTHORACIC VASCULAR SURGERY)

## 2023-03-20 PROCEDURE — G8427 DOCREV CUR MEDS BY ELIG CLIN: HCPCS | Performed by: THORACIC SURGERY (CARDIOTHORACIC VASCULAR SURGERY)

## 2023-03-20 PROCEDURE — 1090F PRES/ABSN URINE INCON ASSESS: CPT | Performed by: THORACIC SURGERY (CARDIOTHORACIC VASCULAR SURGERY)

## 2023-03-20 PROCEDURE — 99213 OFFICE O/P EST LOW 20 MIN: CPT | Performed by: THORACIC SURGERY (CARDIOTHORACIC VASCULAR SURGERY)

## 2023-03-20 PROCEDURE — 3074F SYST BP LT 130 MM HG: CPT | Performed by: THORACIC SURGERY (CARDIOTHORACIC VASCULAR SURGERY)

## 2023-03-20 PROCEDURE — G8400 PT W/DXA NO RESULTS DOC: HCPCS | Performed by: THORACIC SURGERY (CARDIOTHORACIC VASCULAR SURGERY)

## 2023-03-20 PROCEDURE — G8420 CALC BMI NORM PARAMETERS: HCPCS | Performed by: THORACIC SURGERY (CARDIOTHORACIC VASCULAR SURGERY)

## 2023-03-20 PROCEDURE — 1123F ACP DISCUSS/DSCN MKR DOCD: CPT | Performed by: THORACIC SURGERY (CARDIOTHORACIC VASCULAR SURGERY)

## 2023-03-20 NOTE — PROGRESS NOTES
embolization coils in this region and does not appear to be active bleeding. 2. Numerous vascular stents are present throughout the left lower extremity which are widely patent. There appears to be three-vessel runoff to both feet. **This report has been created using voice recognition software. It may contain minor errors which are inherent in voice recognition technology. **    Final report electronically signed by Dr Kevin Flor on 2/21/2023 3:48 PM       CT Angiogram Chest: No results found for this or any previous visit. Assessment/Plan     1. Athscl native arteries of left leg w ulceration oth prt foot ([address]    2. Varicose veins of both lower extremities with pain      Much improved left leg arterial system. Rest pain resolved. ROBERT left now normal.    Mild venous insufficiency. Plan:  Check CBC  Wear compression stockings, elevate legs and avoid prolonged standing. RTC in 2 weeks. Return in about 2 weeks (around 4/3/2023).       Electronically signed by Juana Recinos MD   3/20/2023 at 10:23 AM EDT

## 2023-04-17 ENCOUNTER — OFFICE VISIT (OUTPATIENT)
Dept: CARDIOTHORACIC SURGERY | Age: 78
End: 2023-04-17
Payer: MEDICARE

## 2023-04-17 VITALS
DIASTOLIC BLOOD PRESSURE: 75 MMHG | SYSTOLIC BLOOD PRESSURE: 127 MMHG | BODY MASS INDEX: 22.26 KG/M2 | WEIGHT: 121 LBS | OXYGEN SATURATION: 98 % | HEIGHT: 62 IN | HEART RATE: 78 BPM

## 2023-04-17 DIAGNOSIS — I70.222 ATHEROSCLEROSIS OF NATIVE ARTERY OF LEFT LOWER EXTREMITY WITH REST PAIN (HCC): Primary | ICD-10-CM

## 2023-04-17 DIAGNOSIS — I83.813 VARICOSE VEINS OF BOTH LOWER EXTREMITIES WITH PAIN: ICD-10-CM

## 2023-04-17 PROCEDURE — 3078F DIAST BP <80 MM HG: CPT | Performed by: THORACIC SURGERY (CARDIOTHORACIC VASCULAR SURGERY)

## 2023-04-17 PROCEDURE — 3074F SYST BP LT 130 MM HG: CPT | Performed by: THORACIC SURGERY (CARDIOTHORACIC VASCULAR SURGERY)

## 2023-04-17 PROCEDURE — 1036F TOBACCO NON-USER: CPT | Performed by: THORACIC SURGERY (CARDIOTHORACIC VASCULAR SURGERY)

## 2023-04-17 PROCEDURE — 1123F ACP DISCUSS/DSCN MKR DOCD: CPT | Performed by: THORACIC SURGERY (CARDIOTHORACIC VASCULAR SURGERY)

## 2023-04-17 PROCEDURE — G8400 PT W/DXA NO RESULTS DOC: HCPCS | Performed by: THORACIC SURGERY (CARDIOTHORACIC VASCULAR SURGERY)

## 2023-04-17 PROCEDURE — G8427 DOCREV CUR MEDS BY ELIG CLIN: HCPCS | Performed by: THORACIC SURGERY (CARDIOTHORACIC VASCULAR SURGERY)

## 2023-04-17 PROCEDURE — 99213 OFFICE O/P EST LOW 20 MIN: CPT | Performed by: THORACIC SURGERY (CARDIOTHORACIC VASCULAR SURGERY)

## 2023-04-17 PROCEDURE — 1090F PRES/ABSN URINE INCON ASSESS: CPT | Performed by: THORACIC SURGERY (CARDIOTHORACIC VASCULAR SURGERY)

## 2023-04-17 PROCEDURE — G8420 CALC BMI NORM PARAMETERS: HCPCS | Performed by: THORACIC SURGERY (CARDIOTHORACIC VASCULAR SURGERY)

## 2023-04-17 NOTE — PROGRESS NOTES
quit smoking about 20 months ago. Her smoking use included cigarettes. She smoked an average of 2 packs per day. She has never used smokeless tobacco. She reports current drug use. Frequency: 2.00 times per week. Drug: Marijuana Radha Last). She reports that she does not drink alcohol. Family History  Fredis Garcia family history includes Breast Cancer (age of onset: 28) in her mother; Breast Cancer (age of onset: 39) in her sister; Cancer in her mother; Diabetes in her maternal grandmother; Liver Disease in her sister; Other in her brother, father, and sister. There is no family history of bicuspid aortic valve, aneurysms, heart transplant, pacemakers, defibrillators, or sudden cardiac death.       Past Surgical History   Past Surgical History:   Procedure Laterality Date    BREAST SURGERY Right 4/9/2019    RIGHT BREAST EXCISIONAL BX, PREOP NEEDLE LOC performed by Arlyn Norman MD at [place] [address]      umbilical -as a child    LUNG BIOPSY  2016    [facility]-    OTHER SURGICAL HISTORY      cubital tunnel release left elbow-    OTHER SURGICAL HISTORY  2000    Nu removal-    OTHER SURGICAL HISTORY  05/09/2018    excision back/lipoma    RI MASTECTOMY PARTIAL Right 3/15/2018    RIGHT BREAST LUMPECTOMY, SENTINAL LYMPH NODE BIOPSY performed by Arlyn Norman MD at [address] OFFICE/OUTPT [facility] N/A 5/9/2018    EXCISION BACK LIPOMA performed by Arlyn Norman MD at [address] LEFT Left 2019    US BREAST BIOPSY W LOC DEVICE 1ST LESION RIGHT Right 2019       Subjective:     Review of Systems  Negative except for HPI    Objective:     /75   Pulse 78   Ht 5' 2\" (1.575 m)   Wt 121 lb (54.9 kg)   SpO2 98%   BMI 22.13 kg/m²     Wt Readings from Last 3 Encounters:   04/17/23 121 lb (54.9 kg)   03/20/23 122 lb (55.3 kg)   02/27/23 122 lb (55.3 kg)     BP Readings from Last 3 Encounters:   04/17/23 127/75   03/20/23 128/71   02/27/23

## 2023-04-27 ENCOUNTER — HOSPITAL ENCOUNTER (OUTPATIENT)
Dept: WOMENS IMAGING | Age: 78
Discharge: HOME | End: 2023-04-27
Payer: MEDICARE

## 2023-04-27 DIAGNOSIS — Z12.31 ENCOUNTER FOR SCREENING MAMMOGRAM FOR MALIGNANT NEOPLASM OF BREAST: ICD-10-CM

## 2023-04-27 DIAGNOSIS — Z12.31 VISIT FOR SCREENING MAMMOGRAM: ICD-10-CM

## 2023-04-27 PROCEDURE — 77063 BREAST TOMOSYNTHESIS BI: CPT

## 2023-07-12 ENCOUNTER — HOSPITAL ENCOUNTER (OUTPATIENT)
Dept: INTERVENTIONAL RADIOLOGY/VASCULAR | Age: 78
Discharge: HOME | End: 2023-07-12
Attending: THORACIC SURGERY (CARDIOTHORACIC VASCULAR SURGERY)
Payer: MEDICARE

## 2023-07-12 DIAGNOSIS — I70.222 ATHEROSCLEROSIS OF NATIVE ARTERY OF LEFT LOWER EXTREMITY WITH REST PAIN (HCC): ICD-10-CM

## 2023-07-12 DIAGNOSIS — I83.813 VARICOSE VEINS OF BOTH LOWER EXTREMITIES WITH PAIN: ICD-10-CM

## 2023-07-12 PROCEDURE — 93970 EXTREMITY STUDY: CPT

## 2023-07-17 ENCOUNTER — TELEPHONE (OUTPATIENT)
Dept: CARDIOTHORACIC SURGERY | Age: 78
End: 2023-07-17

## 2023-07-17 NOTE — TELEPHONE ENCOUNTER
Patient left a message today at 8:46 am stating she needed to cancel her appointment with Dr. Hemalatha Paiz today due to a death in the family, she states she will call our office to r/s appointment. Patient was suppose to be seen for a 3 mth FU after repeat reflux study. Reflux study completed on 7/13/2023.

## 2023-07-24 RX ORDER — CLOPIDOGREL BISULFATE 75 MG/1
TABLET ORAL
Qty: 30 TABLET | Refills: 0 | Status: SHIPPED | OUTPATIENT
Start: 2023-07-24

## 2023-08-21 RX ORDER — CLOPIDOGREL BISULFATE 75 MG/1
TABLET ORAL
Qty: 30 TABLET | Refills: 0 | Status: SHIPPED | OUTPATIENT
Start: 2023-08-21

## 2023-09-11 ENCOUNTER — OFFICE VISIT (OUTPATIENT)
Dept: SURGERY | Age: 78
End: 2023-09-11
Payer: MEDICARE

## 2023-09-11 VITALS
HEART RATE: 56 BPM | BODY MASS INDEX: 23.37 KG/M2 | SYSTOLIC BLOOD PRESSURE: 116 MMHG | TEMPERATURE: 97.3 F | DIASTOLIC BLOOD PRESSURE: 60 MMHG | HEIGHT: 62 IN | OXYGEN SATURATION: 94 % | WEIGHT: 127 LBS

## 2023-09-11 DIAGNOSIS — Z17.1 MALIGNANT NEOPLASM OF UPPER-OUTER QUADRANT OF RIGHT BREAST IN FEMALE, ESTROGEN RECEPTOR NEGATIVE (HCC): Primary | ICD-10-CM

## 2023-09-11 DIAGNOSIS — D47.1 MYELOPROLIFERATIVE DISORDER (HCC): ICD-10-CM

## 2023-09-11 DIAGNOSIS — I10 ESSENTIAL HYPERTENSION: ICD-10-CM

## 2023-09-11 DIAGNOSIS — I70.202 ATHEROSCLEROSIS OF NATIVE ARTERY OF LEFT LOWER EXTREMITY, WITH UNSPECIFIED PRESENCE OF CLINICAL MANIFESTATION (HCC): ICD-10-CM

## 2023-09-11 DIAGNOSIS — C50.411 MALIGNANT NEOPLASM OF UPPER-OUTER QUADRANT OF RIGHT BREAST IN FEMALE, ESTROGEN RECEPTOR NEGATIVE (HCC): Primary | ICD-10-CM

## 2023-09-11 PROCEDURE — 1123F ACP DISCUSS/DSCN MKR DOCD: CPT | Performed by: SURGERY

## 2023-09-11 PROCEDURE — 3074F SYST BP LT 130 MM HG: CPT | Performed by: SURGERY

## 2023-09-11 PROCEDURE — 99214 OFFICE O/P EST MOD 30 MIN: CPT | Performed by: SURGERY

## 2023-09-11 PROCEDURE — 3078F DIAST BP <80 MM HG: CPT | Performed by: SURGERY

## 2023-09-11 RX ORDER — ATORVASTATIN CALCIUM 80 MG/1
TABLET, FILM COATED ORAL
COMMUNITY
Start: 2023-07-05

## 2023-09-11 RX ORDER — IPRATROPIUM BROMIDE AND ALBUTEROL 20; 100 UG/1; UG/1
SPRAY, METERED RESPIRATORY (INHALATION)
COMMUNITY
Start: 2023-07-01

## 2023-09-11 RX ORDER — GABAPENTIN 300 MG/1
300 CAPSULE ORAL 2 TIMES DAILY
COMMUNITY

## 2023-09-11 ASSESSMENT — ENCOUNTER SYMPTOMS
WHEEZING: 0
VOMITING: 0
COLOR CHANGE: 0
COUGH: 0
EYE REDNESS: 0
NAUSEA: 0
CONSTIPATION: 0
BLOOD IN STOOL: 0
VOICE CHANGE: 0
CHEST TIGHTNESS: 0
ABDOMINAL PAIN: 0
SHORTNESS OF BREATH: 1
SORE THROAT: 0
TROUBLE SWALLOWING: 0

## 2023-09-11 NOTE — PROGRESS NOTES
<Sign Out Dr. Mary Kay Lyn M.D., F.C.A.P. [facility]/ [address]  Printed on:  4/15/2019  [address] [address]  [facility], [address]  Original print date: 04/15/2019     Overall   2 - Benign     Mammography Recommendations     Side Due   Annual Mammogram  4/26/2024     Breast Density     Overall   Breast Composition b - Scattered fibroglandular density     Details    Reading Physician Reading Date Result Priority   Ashley Noble MD  [phone] 4/27/2023      Physician Responsible for MQSA Outcome Reason    Ashley Noble MD Signed      Narrative & Impression  LOCATION: [place]     PROCEDURE: MOUNA [place]ST[place] DIGITAL SCREEN BILATERAL     CLINICAL INFORMATION: Visit for screening mammogram. Tomosynthesis. CLINICAL:     Screening and First degree family history of breast cancer  PATIENT MEDICAL HISTORY:  Medical history includes breast cancer. FAMILY HISTORY:   Family medical history includes breast cancer in 2 relatives (mother, sister). RISK VALUES: Gabo-Avelino 10yr.: na%, Leilani life:   na%     COMPARISON: 10/21/2021, 10/20/2020, 2/11/2019. TECHNIQUE: Bilateral CC and MLO views of the breasts were obtained. 3D tomosynthesis was utilized. CAD was utilized. BREAST COMPOSITION: There are scattered fibroglandular elements in the breast(s) that could obscure a lesion on mammography. FINDINGS: There are bilateral benign-appearing calcifications. There are bilateral vascular calcifications. No significant masses, calcifications, or other findings are seen in the breast(s). There has been no significant interval change. IMPRESSION:  No mammographic evidence of malignancy. A 1 year screening mammogram is recommended. A result letter will be sent to the patient. She will also receive a reminder 1 month prior to her next mammogram.              BI-RADS CATEGORY 2: BENIGN FINDINGS.      Management

## 2023-09-25 RX ORDER — CLOPIDOGREL BISULFATE 75 MG/1
TABLET ORAL
Qty: 30 TABLET | Refills: 0 | Status: SHIPPED | OUTPATIENT
Start: 2023-09-25

## 2023-10-09 ENCOUNTER — OFFICE VISIT (OUTPATIENT)
Dept: CARDIOTHORACIC SURGERY | Age: 78
End: 2023-10-09
Payer: MEDICARE

## 2023-10-09 VITALS
HEART RATE: 75 BPM | BODY MASS INDEX: 23.37 KG/M2 | DIASTOLIC BLOOD PRESSURE: 77 MMHG | SYSTOLIC BLOOD PRESSURE: 140 MMHG | WEIGHT: 127 LBS | HEIGHT: 62 IN

## 2023-10-09 DIAGNOSIS — I70.222 ATHEROSCLEROSIS OF NATIVE ARTERY OF LEFT LOWER EXTREMITY WITH REST PAIN (HCC): Primary | ICD-10-CM

## 2023-10-09 DIAGNOSIS — I83.813 VARICOSE VEINS OF BOTH LOWER EXTREMITIES WITH PAIN: ICD-10-CM

## 2023-10-09 PROCEDURE — 99213 OFFICE O/P EST LOW 20 MIN: CPT | Performed by: THORACIC SURGERY (CARDIOTHORACIC VASCULAR SURGERY)

## 2023-10-09 PROCEDURE — 3077F SYST BP >= 140 MM HG: CPT | Performed by: THORACIC SURGERY (CARDIOTHORACIC VASCULAR SURGERY)

## 2023-10-09 PROCEDURE — 1123F ACP DISCUSS/DSCN MKR DOCD: CPT | Performed by: THORACIC SURGERY (CARDIOTHORACIC VASCULAR SURGERY)

## 2023-10-09 PROCEDURE — 3078F DIAST BP <80 MM HG: CPT | Performed by: THORACIC SURGERY (CARDIOTHORACIC VASCULAR SURGERY)

## 2023-10-09 NOTE — PROGRESS NOTES
[facility]  [address]  [address]  Dept: [phone]  Dept Fax: [phone]: [phone]    Visit Date: 10/9/2023    Ms. Marilyn Brooks is a 69 y. o.female  who presented for:  Chief Complaint   Patient presents with    Follow-up     Fu venous reflux        HPI:   HPI   The patient presents for follow-up after her venous reflux study in July 2023. Venous reflux study shows no deep venous reflux bilaterally. Shows no superficial reflux on the left lower extremity and right distal calf GSV reflux only. She is status post left external iliac artery and common iliac artery stents and left SFA and popliteal stents and left SFA circumflex iliac coil embolization for left lower extremity claudication. Her previous ABIs showed a left ROBERT of 1.02 and a right ROBERT of 0.71. The patient states that she is doing well from her PAD perspective with no coolness or cramps or tingling. With regards to her edema she has mild left equivalent to right ankle edema. She is not wearing stockings but she does have some new ones at home. She denies any fullness or tightness or other issues with the left leg other than the mild edema. Current Outpatient Medications:     clopidogrel (PLAVIX) 75 MG tablet, Take 1 tablet by mouth once daily, Disp: 30 tablet, Rfl: 0    atorvastatin (LIPITOR) 80 MG tablet, , Disp: , Rfl:     gabapentin (NEURONTIN) 300 MG capsule, Take 1 capsule by mouth in the morning and at bedtime. , Disp: , Rfl:     COMBIVENT RESPIMAT  MCG/ACT AERS inhaler, , Disp: , Rfl:     levothyroxine (SYNTHROID) 75 MCG tablet, Take 0.5 tablets by mouth Daily, Disp: 30 tablet, Rfl: 3    ELIQUIS 5 MG TABS tablet, Take 1 tablet by mouth 2 times daily, Disp: , Rfl:     Zinc Acetate, Oral, (ZINC ACETATE PO), Take by mouth, Disp: , Rfl:     Ascorbic Acid (VITAMIN C) 250 MG tablet, Take 1 tablet by mouth

## 2023-10-23 ENCOUNTER — TRANSCRIBE ORDERS (OUTPATIENT)
Dept: ADMINISTRATIVE | Age: 78
End: 2023-10-23

## 2023-10-23 DIAGNOSIS — Z12.31 SCREENING MAMMOGRAM FOR HIGH-RISK PATIENT: Primary | ICD-10-CM

## 2023-10-30 RX ORDER — CLOPIDOGREL BISULFATE 75 MG/1
TABLET ORAL
Qty: 30 TABLET | Refills: 0 | Status: SHIPPED | OUTPATIENT
Start: 2023-10-30

## 2023-12-04 RX ORDER — CLOPIDOGREL BISULFATE 75 MG/1
TABLET ORAL
Qty: 30 TABLET | Refills: 0 | Status: SHIPPED | OUTPATIENT
Start: 2023-12-04

## 2024-05-14 ENCOUNTER — HOSPITAL ENCOUNTER (OUTPATIENT)
Dept: WOMENS IMAGING | Age: 79
Discharge: HOME | End: 2024-05-14
Attending: INTERNAL MEDICINE
Payer: MEDICARE

## 2024-05-14 VITALS — WEIGHT: 103 LBS | BODY MASS INDEX: 18.95 KG/M2 | HEIGHT: 62 IN

## 2024-05-14 DIAGNOSIS — Z12.31 SCREENING MAMMOGRAM FOR HIGH-RISK PATIENT: ICD-10-CM

## 2024-05-14 PROCEDURE — 77063 BREAST TOMOSYNTHESIS BI: CPT

## 2024-05-15 ENCOUNTER — HOSPITAL ENCOUNTER (OUTPATIENT)
Dept: INTERVENTIONAL RADIOLOGY/VASCULAR | Age: 79
Discharge: HOME | End: 2024-05-17
Attending: THORACIC SURGERY (CARDIOTHORACIC VASCULAR SURGERY)
Payer: MEDICARE

## 2024-05-15 DIAGNOSIS — I70.222 ATHEROSCLEROSIS OF NATIVE ARTERY OF LEFT LOWER EXTREMITY WITH REST PAIN (HCC): ICD-10-CM

## 2024-05-15 PROCEDURE — 93925 LOWER EXTREMITY STUDY: CPT

## 2024-08-26 ENCOUNTER — OFFICE VISIT (OUTPATIENT)
Age: 79
End: 2024-08-26
Payer: MEDICARE

## 2024-08-26 VITALS
BODY MASS INDEX: 19.13 KG/M2 | SYSTOLIC BLOOD PRESSURE: 126 MMHG | DIASTOLIC BLOOD PRESSURE: 61 MMHG | WEIGHT: 104.6 LBS | OXYGEN SATURATION: 97 % | HEART RATE: 60 BPM

## 2024-08-26 DIAGNOSIS — I70.222 ATHEROSCLEROSIS OF NATIVE ARTERY OF LEFT LOWER EXTREMITY WITH REST PAIN (HCC): Primary | ICD-10-CM

## 2024-08-26 DIAGNOSIS — I83.813 VARICOSE VEINS OF BOTH LOWER EXTREMITIES WITH PAIN: ICD-10-CM

## 2024-08-26 PROCEDURE — 3078F DIAST BP <80 MM HG: CPT | Performed by: THORACIC SURGERY (CARDIOTHORACIC VASCULAR SURGERY)

## 2024-08-26 PROCEDURE — 1123F ACP DISCUSS/DSCN MKR DOCD: CPT | Performed by: THORACIC SURGERY (CARDIOTHORACIC VASCULAR SURGERY)

## 2024-08-26 PROCEDURE — 3074F SYST BP LT 130 MM HG: CPT | Performed by: THORACIC SURGERY (CARDIOTHORACIC VASCULAR SURGERY)

## 2024-08-26 PROCEDURE — 99213 OFFICE O/P EST LOW 20 MIN: CPT | Performed by: THORACIC SURGERY (CARDIOTHORACIC VASCULAR SURGERY)

## 2024-08-26 RX ORDER — BIOTIN 1 MG
TABLET ORAL DAILY
COMMUNITY

## 2024-08-26 NOTE — PROGRESS NOTES
Medications:     Biotin 1000 MCG TABS, Take by mouth daily, Disp: , Rfl:     clopidogrel (PLAVIX) 75 MG tablet, Take 1 tablet by mouth once daily, Disp: 30 tablet, Rfl: 0    atorvastatin (LIPITOR) 80 MG tablet, , Disp: , Rfl:     gabapentin (NEURONTIN) 300 MG capsule, Take 1 capsule by mouth in the morning and at bedtime., Disp: , Rfl:     COMBIVENT RESPIMAT  MCG/ACT AERS inhaler, , Disp: , Rfl:     Zinc Acetate, Oral, (ZINC ACETATE PO), Take by mouth, Disp: , Rfl:     Ascorbic Acid (VITAMIN C) 250 MG tablet, Take 1 tablet by mouth daily, Disp: , Rfl:     Hydroxyurea (HYDREA PO), Take by mouth every other day, Disp: , Rfl:     guaiFENesin (MUCINEX) 600 MG extended release tablet, Take 2 tablets by mouth 2 times daily as needed, Disp: , Rfl:     nystatin (MYCOSTATIN) 507218 UNIT/ML suspension, Take 5 mLs by mouth 4 times daily, Disp: , Rfl:     Multiple Vitamins-Minerals (CENTRUM SILVER PO), Take 1 tablet by mouth daily, Disp: , Rfl:     vitamin D (CHOLECALCIFEROL) 1000 UNIT TABS tablet, Take 2 tablets by mouth daily, Disp: , Rfl:     montelukast (SINGULAIR) 10 MG tablet, Take 1 tablet by mouth nightly, Disp: , Rfl:     ALPRAZolam (XANAX) 0.5 MG tablet, Take 1 tablet by mouth 3 times daily as needed for Sleep. Taking 4 times daily, Disp: , Rfl:     benzonatate (TESSALON) 100 MG capsule, Take 1 capsule by mouth 3 times daily as needed for Cough, Disp: , Rfl:     albuterol sulfate  (90 Base) MCG/ACT inhaler, Inhale 2 puffs into the lungs every 4 hours as needed for Wheezing, Disp: , Rfl:     sennosides-docusate sodium (SENOKOT-S) 8.6-50 MG tablet, Take 1 tablet by mouth daily as needed for Constipation, Disp: , Rfl:     NONFORMULARY, Eye drops as needed FOR DRY EYE, Disp: , Rfl:     fluticasone (FLONASE) 50 MCG/ACT nasal spray, 1 spray by Nasal route daily as needed for Rhinitis, Disp: , Rfl:     Neomycin-Bacitracin-Polymyxin (HCA TRIPLE ANTIBIOTIC OINTMENT EX), Apply topically as needed (Patient not  ([facility])    COMPARISON: Prior ROBERT study, 12/2/2022    TECHNIQUE: Multiple permanent grayscale and color flow sonographic images of the major arteries of both lower extremities were obtained from the level of the groin to the level of the ankle . Spectral Doppler waveforms were obtained and velocity   measurements were measured.                FINDINGS:     RIGHT ARTERY  (PSV cm/sec)    CFA ---------------> 427 PSV cm/sec  PROF -------------> 83 PSV cm/sec  SFA PROX ------->76 PSV cm/sec   SFA MID ---------->112 PSV cm/sec  SFA DIST -------->90 PSV cm/sec   POP A PROX --->52 PSV cm/sec   POP A DIST ---->61 PSV cm/sec   PTA --------------->43 PSV cm/sec   PERONEAL ----->39 PSV cm/sec   LAW ----------------> 44 PSV cm/sec  DPA ---------------->54 PSV cm/sec    LEFT ARTERY  (PSV cm/sec)    CFA ---------------> 220 PSV cm/sec  PROF -------------> 123 PSV cm/sec  SFA PROX ------->54 PSV cm/sec   SFA MID ---------->48 PSV cm/sec  SFA DIST -------->44 PSV cm/sec   POP A PROX --->38 PSV cm/sec   POP A DIST ---->54 PSV cm/sec   PTA --------------->63 PSV cm/sec   PERONEAL ----->88 PSV cm/sec   LAW ----------------> 78 PSV cm/sec  DPA ----------------> 70 PSV cm/sec    ROBERT  RIGHT    LAW----->0.79  PTA----->0.75    ROBERT   LEFT    LAW----->1.02  PTA----->0.92      VELOCITY MEASUREMENTS: Normal ABIs left side. Mildly diminished ABIs right side. ABIs bilaterally have improved relative the prior study..     RIGHT LEG:: Elevated velocities are present in the common femoral artery, suggesting moderate inflow stenosis in the right common or external iliac artery. Good flow in the profunda. Moderate atherosclerotic plaque in the distal common femoral artery   with stenosis of at least 50%. There is mildly dampened pulsatility in the SFA diffusely. Multifocal atherosclerotic plaque is seen throughout the SFA with multifocal mild stenoses. Excellent pulsatility in the popliteal artery. Good pulsatility in all 3   trifurcation

## 2024-11-04 ENCOUNTER — HOSPITAL ENCOUNTER (OUTPATIENT)
Dept: CT IMAGING | Age: 79
Discharge: HOME | End: 2024-11-04
Attending: THORACIC SURGERY (CARDIOTHORACIC VASCULAR SURGERY)
Payer: MEDICARE

## 2024-11-04 DIAGNOSIS — I70.222 ATHEROSCLEROSIS OF NATIVE ARTERY OF LEFT LOWER EXTREMITY WITH REST PAIN (HCC): ICD-10-CM

## 2024-11-04 LAB — POC CREATININE WHOLE BLOOD: 1 MG/DL (ref 0.5–1.2)

## 2024-11-04 PROCEDURE — 6360000004 HC RX CONTRAST MEDICATION: Performed by: THORACIC SURGERY (CARDIOTHORACIC VASCULAR SURGERY)

## 2024-11-04 PROCEDURE — 75635 CT ANGIO ABDOMINAL ARTERIES: CPT

## 2024-11-04 PROCEDURE — 82565 ASSAY OF CREATININE: CPT

## 2024-11-04 RX ORDER — IOPAMIDOL 755 MG/ML
115 INJECTION, SOLUTION INTRAVASCULAR
Status: COMPLETED | OUTPATIENT
Start: 2024-11-04 | End: 2024-11-04

## 2024-11-04 RX ADMIN — IOPAMIDOL 115 ML: 755 INJECTION, SOLUTION INTRAVENOUS at 14:57

## 2025-01-13 ENCOUNTER — OFFICE VISIT (OUTPATIENT)
Age: 80
End: 2025-01-13
Payer: MEDICARE

## 2025-01-13 VITALS
HEIGHT: 62 IN | SYSTOLIC BLOOD PRESSURE: 148 MMHG | DIASTOLIC BLOOD PRESSURE: 67 MMHG | WEIGHT: 101.6 LBS | BODY MASS INDEX: 18.69 KG/M2 | HEART RATE: 59 BPM

## 2025-01-13 DIAGNOSIS — I83.813 VARICOSE VEINS OF BOTH LOWER EXTREMITIES WITH PAIN: ICD-10-CM

## 2025-01-13 DIAGNOSIS — I70.222 ATHEROSCLEROSIS OF NATIVE ARTERY OF LEFT LOWER EXTREMITY WITH REST PAIN (HCC): Primary | ICD-10-CM

## 2025-01-13 DIAGNOSIS — I70.213 ATHEROSCLEROSIS OF NATIVE ARTERY OF BOTH LOWER EXTREMITIES WITH INTERMITTENT CLAUDICATION (HCC): ICD-10-CM

## 2025-01-13 PROCEDURE — 1160F RVW MEDS BY RX/DR IN RCRD: CPT | Performed by: THORACIC SURGERY (CARDIOTHORACIC VASCULAR SURGERY)

## 2025-01-13 PROCEDURE — 3078F DIAST BP <80 MM HG: CPT | Performed by: THORACIC SURGERY (CARDIOTHORACIC VASCULAR SURGERY)

## 2025-01-13 PROCEDURE — 1090F PRES/ABSN URINE INCON ASSESS: CPT | Performed by: THORACIC SURGERY (CARDIOTHORACIC VASCULAR SURGERY)

## 2025-01-13 PROCEDURE — G8427 DOCREV CUR MEDS BY ELIG CLIN: HCPCS | Performed by: THORACIC SURGERY (CARDIOTHORACIC VASCULAR SURGERY)

## 2025-01-13 PROCEDURE — 1123F ACP DISCUSS/DSCN MKR DOCD: CPT | Performed by: THORACIC SURGERY (CARDIOTHORACIC VASCULAR SURGERY)

## 2025-01-13 PROCEDURE — 1159F MED LIST DOCD IN RCRD: CPT | Performed by: THORACIC SURGERY (CARDIOTHORACIC VASCULAR SURGERY)

## 2025-01-13 PROCEDURE — 99213 OFFICE O/P EST LOW 20 MIN: CPT | Performed by: THORACIC SURGERY (CARDIOTHORACIC VASCULAR SURGERY)

## 2025-01-13 PROCEDURE — G8400 PT W/DXA NO RESULTS DOC: HCPCS | Performed by: THORACIC SURGERY (CARDIOTHORACIC VASCULAR SURGERY)

## 2025-01-13 PROCEDURE — 1036F TOBACCO NON-USER: CPT | Performed by: THORACIC SURGERY (CARDIOTHORACIC VASCULAR SURGERY)

## 2025-01-13 PROCEDURE — G8420 CALC BMI NORM PARAMETERS: HCPCS | Performed by: THORACIC SURGERY (CARDIOTHORACIC VASCULAR SURGERY)

## 2025-01-13 PROCEDURE — 3077F SYST BP >= 140 MM HG: CPT | Performed by: THORACIC SURGERY (CARDIOTHORACIC VASCULAR SURGERY)

## 2025-01-13 RX ORDER — LEVOTHYROXINE SODIUM 25 UG/1
25 TABLET ORAL DAILY
COMMUNITY

## 2025-01-13 NOTE — PATIENT INSTRUCTIONS
If you receive a survey asking about your care experience, please respond. Your answers will help ensure you receive high-quality care at this office. Thank you!    Your Medical Assistant today: Zahida PETTIT  Thank you for coming to our office! It was a pleasure to serve you.

## 2025-01-27 ENCOUNTER — HOSPITAL ENCOUNTER (OUTPATIENT)
Dept: INTERVENTIONAL RADIOLOGY/VASCULAR | Age: 80
Discharge: HOME | End: 2025-01-29
Attending: THORACIC SURGERY (CARDIOTHORACIC VASCULAR SURGERY)
Payer: MEDICARE

## 2025-01-27 DIAGNOSIS — I70.213 ATHEROSCLEROSIS OF NATIVE ARTERY OF BOTH LOWER EXTREMITIES WITH INTERMITTENT CLAUDICATION (HCC): ICD-10-CM

## 2025-01-27 DIAGNOSIS — I70.222 ATHEROSCLEROSIS OF NATIVE ARTERY OF LEFT LOWER EXTREMITY WITH REST PAIN (HCC): ICD-10-CM

## 2025-01-27 PROCEDURE — 93924 LWR XTR VASC STDY BILAT: CPT

## 2025-03-05 ENCOUNTER — OFFICE VISIT (OUTPATIENT)
Age: 80
End: 2025-03-05
Payer: MEDICARE

## 2025-03-05 VITALS
DIASTOLIC BLOOD PRESSURE: 62 MMHG | SYSTOLIC BLOOD PRESSURE: 135 MMHG | BODY MASS INDEX: 19.36 KG/M2 | WEIGHT: 105.2 LBS | HEIGHT: 62 IN | HEART RATE: 66 BPM

## 2025-03-05 DIAGNOSIS — I70.222 ATHEROSCLEROSIS OF NATIVE ARTERY OF LEFT LOWER EXTREMITY WITH REST PAIN (HCC): Primary | ICD-10-CM

## 2025-03-05 PROCEDURE — 3075F SYST BP GE 130 - 139MM HG: CPT | Performed by: PHYSICIAN ASSISTANT

## 2025-03-05 PROCEDURE — 1160F RVW MEDS BY RX/DR IN RCRD: CPT | Performed by: PHYSICIAN ASSISTANT

## 2025-03-05 PROCEDURE — 1123F ACP DISCUSS/DSCN MKR DOCD: CPT | Performed by: PHYSICIAN ASSISTANT

## 2025-03-05 PROCEDURE — 99214 OFFICE O/P EST MOD 30 MIN: CPT | Performed by: PHYSICIAN ASSISTANT

## 2025-03-05 PROCEDURE — 3078F DIAST BP <80 MM HG: CPT | Performed by: PHYSICIAN ASSISTANT

## 2025-03-05 PROCEDURE — 1159F MED LIST DOCD IN RCRD: CPT | Performed by: PHYSICIAN ASSISTANT

## 2025-03-05 PROCEDURE — G8420 CALC BMI NORM PARAMETERS: HCPCS | Performed by: PHYSICIAN ASSISTANT

## 2025-03-05 PROCEDURE — 1036F TOBACCO NON-USER: CPT | Performed by: PHYSICIAN ASSISTANT

## 2025-03-05 PROCEDURE — 1090F PRES/ABSN URINE INCON ASSESS: CPT | Performed by: PHYSICIAN ASSISTANT

## 2025-03-05 PROCEDURE — G8427 DOCREV CUR MEDS BY ELIG CLIN: HCPCS | Performed by: PHYSICIAN ASSISTANT

## 2025-03-05 PROCEDURE — G8400 PT W/DXA NO RESULTS DOC: HCPCS | Performed by: PHYSICIAN ASSISTANT

## 2025-03-05 NOTE — PROGRESS NOTES
burning/tingling at rest in both feet at night only  2.  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  This lead to Stress ABIs with findings consistent with significant disease also.  After long discussion of the abnormal findings, she would like to hold off on further testing with angiogram as she has no symptoms with walking.  She states her walking is limited by her O2 dependent COPD, not legs.    Plan:  Continue with ASA and Plavix.  She has not smoked for 3 years.  Encourage ambulation and let us know if symptoms consistent with claudication start to occur.  Return OV in 6 months.  She will require an angiogram if she calls with symptoms of claudication or tissue sores.    Electronically signed by Jesus Jansen PA-C   3/5/2025 at 11:13 AM EST

## 2025-06-09 ENCOUNTER — HOSPITAL ENCOUNTER (OUTPATIENT)
Dept: WOMENS IMAGING | Age: 80
Discharge: HOME | End: 2025-06-09
Attending: INTERNAL MEDICINE
Payer: MEDICARE

## 2025-06-09 VITALS — HEIGHT: 62 IN | WEIGHT: 105 LBS | BODY MASS INDEX: 19.32 KG/M2

## 2025-06-09 DIAGNOSIS — F17.210 NICOTINE DEPENDENCE, CIGARETTES, UNCOMPLICATED: ICD-10-CM

## 2025-06-09 DIAGNOSIS — Z12.31 ENCOUNTER FOR SCREENING MAMMOGRAM FOR MALIGNANT NEOPLASM OF BREAST: ICD-10-CM

## 2025-06-09 DIAGNOSIS — D47.3 ESSENTIAL (HEMORRHAGIC) THROMBOCYTHEMIA (HCC): ICD-10-CM

## 2025-06-09 DIAGNOSIS — C50.411 MALIGNANT NEOPLASM OF UPPER-OUTER QUADRANT OF RIGHT FEMALE BREAST, UNSPECIFIED ESTROGEN RECEPTOR STATUS (HCC): ICD-10-CM

## 2025-06-09 PROCEDURE — 77063 BREAST TOMOSYNTHESIS BI: CPT

## (undated) DEVICE — COVER US PRB W5XL96IN LTX W/ GEL

## (undated) DEVICE — SOLUTION IV 1000ML 0.9% SOD CHL PH 5 INJ USP VIAFLX PLAS

## (undated) DEVICE — GOWN,SIRUS,NON REINFRCD,LARGE,SET IN SL: Brand: MEDLINE

## (undated) DEVICE — SHEET, T, LAPAROTOMY, STERILE: Brand: MEDLINE

## (undated) DEVICE — HYPODERMIC SAFETY NEEDLE: Brand: MAGELLAN

## (undated) DEVICE — PACK,SET UP,NO DRAPES: Brand: MEDLINE

## (undated) DEVICE — GLOVE ORANGE PI 7   MSG9070

## (undated) DEVICE — COVER ARMBRD W13XL28.5IN IMPERV BLU FOR OP RM

## (undated) DEVICE — SPECIMEN ORIENTATION CHARMS, SIX DISTINCTLY SHAPED STERILE 10MM CHARMS: Brand: MARGINMAP

## (undated) DEVICE — INTENDED FOR TISSUE SEPARATION, AND OTHER PROCEDURES THAT REQUIRE A SHARP SURGICAL BLADE TO PUNCTURE OR CUT.: Brand: BARD-PARKER ® CARBON RIB-BACK BLADES

## (undated) DEVICE — SYRINGE MED 10ML LUERLOCK TIP W/O SFTY DISP

## (undated) DEVICE — SKIN AFFIX SURG ADHESIVE 72/CS 0.55ML: Brand: MEDLINE

## (undated) DEVICE — SUTURE VCRL SZ 2-0 L27IN ABSRB UD L26MM SH 1/2 CIR J417H

## (undated) DEVICE — TUBING, SUCTION, 1/4" X 12', STRAIGHT: Brand: MEDLINE

## (undated) DEVICE — TUBING, SUCTION, 1/4" X 20', STRAIGHT: Brand: MEDLINE INDUSTRIES, INC.

## (undated) DEVICE — SURE SET SINGLE BASIN-LF: Brand: MEDLINE INDUSTRIES, INC.

## (undated) DEVICE — SHEETS DRAW

## (undated) DEVICE — COAGULATOR ELECSURG BLADE 10 FTX1 IN PTFE STRL ULTRACLEAN

## (undated) DEVICE — BREAST HERNIA PACK: Brand: MEDLINE INDUSTRIES, INC.

## (undated) DEVICE — GLOVE SURG SZ 65 THK91MIL LTX FREE SYN POLYISOPRENE

## (undated) DEVICE — GOWN,SIRUS,NONRNF,SETINSLV,XL,20/CS: Brand: MEDLINE

## (undated) DEVICE — 1840 FOAM BLOCK NEEDLE COUNTER: Brand: DEVON

## (undated) DEVICE — LINER SUCT CANSTR 1500CC SEMI RIG W/ POR HYDROPHOBIC SHUT

## (undated) DEVICE — GARMENT,MEDLINE,DVT,INT,CALF,MED, GEN2: Brand: MEDLINE

## (undated) DEVICE — SYRINGE IRRIG 60ML SFT PLIABLE BLB EZ TO GRP 1 HND USE W/

## (undated) DEVICE — GAUZE,SPONGE,8"X4",12PLY,XRAY,STRL,LF: Brand: MEDLINE

## (undated) DEVICE — CHLORAPREP 26ML ORANGE

## (undated) DEVICE — GLOVE SURG SZ 7 L12IN FNGR THK94MIL TRNSLUC YEL LTX HYDRGEL

## (undated) DEVICE — STRIP,CLOSURE,WOUND,MEDI-STRIP,1/2X4: Brand: MEDLINE

## (undated) DEVICE — YANKAUER,BULB TIP,W/O VENT,RIGID,STERILE: Brand: MEDLINE

## (undated) DEVICE — APPLIER LIG CLP M L11IN TI STR RNG HNDL FOR 20 CLP DISP

## (undated) DEVICE — BANDAGE ADH W1XL3IN NAT FAB WVN FLX DURABLE N ADH PD SEAL

## (undated) DEVICE — TOWEL,OR,DSP,ST,BLUE,DLX,4/PK,20PK/CS: Brand: MEDLINE